# Patient Record
Sex: MALE | Race: WHITE | Employment: OTHER
[De-identification: names, ages, dates, MRNs, and addresses within clinical notes are randomized per-mention and may not be internally consistent; named-entity substitution may affect disease eponyms.]

---

## 2018-04-16 ENCOUNTER — RECORDS - HEALTHEAST (OUTPATIENT)
Dept: ADMINISTRATIVE | Facility: OTHER | Age: 83
End: 2018-04-16

## 2018-04-16 ENCOUNTER — AMBULATORY - HEALTHEAST (OUTPATIENT)
Dept: LAB | Facility: CLINIC | Age: 83
End: 2018-04-16

## 2018-04-16 DIAGNOSIS — D50.9 FE DEFICIENCY ANEMIA: ICD-10-CM

## 2018-04-16 LAB — HGB BLD-MCNC: 5.3 G/DL (ref 14–18)

## 2018-04-17 ENCOUNTER — RECORDS - HEALTHEAST (OUTPATIENT)
Dept: ADMINISTRATIVE | Facility: OTHER | Age: 83
End: 2018-04-17

## 2018-04-30 ENCOUNTER — AMBULATORY - HEALTHEAST (OUTPATIENT)
Dept: LAB | Facility: CLINIC | Age: 83
End: 2018-04-30

## 2018-04-30 DIAGNOSIS — D50.9 ANEMIA, IRON DEFICIENCY: ICD-10-CM

## 2018-04-30 LAB — HGB BLD-MCNC: 7 G/DL (ref 14–18)

## 2018-05-02 ENCOUNTER — RECORDS - HEALTHEAST (OUTPATIENT)
Dept: ADMINISTRATIVE | Facility: OTHER | Age: 83
End: 2018-05-02

## 2018-05-14 ENCOUNTER — AMBULATORY - HEALTHEAST (OUTPATIENT)
Dept: LAB | Facility: CLINIC | Age: 83
End: 2018-05-14

## 2018-05-14 DIAGNOSIS — D50.9 NORMOCYTIC HYPOCHROMIC ANEMIA: ICD-10-CM

## 2018-05-14 LAB — HGB BLD-MCNC: 5.3 G/DL (ref 14–18)

## 2018-05-16 ENCOUNTER — HOME CARE/HOSPICE - HEALTHEAST (OUTPATIENT)
Dept: HOME HEALTH SERVICES | Facility: HOME HEALTH | Age: 83
End: 2018-05-16

## 2018-05-18 ENCOUNTER — HOME CARE/HOSPICE - HEALTHEAST (OUTPATIENT)
Dept: HOME HEALTH SERVICES | Facility: HOME HEALTH | Age: 83
End: 2018-05-18

## 2018-05-25 ENCOUNTER — HOME CARE/HOSPICE - HEALTHEAST (OUTPATIENT)
Dept: HOME HEALTH SERVICES | Facility: HOME HEALTH | Age: 83
End: 2018-05-25

## 2018-05-30 ENCOUNTER — HOME CARE/HOSPICE - HEALTHEAST (OUTPATIENT)
Dept: HOME HEALTH SERVICES | Facility: HOME HEALTH | Age: 83
End: 2018-05-30

## 2018-06-01 ENCOUNTER — AMBULATORY - HEALTHEAST (OUTPATIENT)
Dept: LAB | Facility: CLINIC | Age: 83
End: 2018-06-01

## 2018-06-01 DIAGNOSIS — D50.9 ANEMIA, IRON DEFICIENCY: ICD-10-CM

## 2018-06-01 LAB — HGB BLD-MCNC: 7.8 G/DL (ref 14–18)

## 2018-06-12 ENCOUNTER — AMBULATORY - HEALTHEAST (OUTPATIENT)
Dept: LAB | Facility: CLINIC | Age: 83
End: 2018-06-12

## 2018-06-12 DIAGNOSIS — D50.9 IRON DEFICIENCY ANEMIA: ICD-10-CM

## 2018-06-12 LAB — HGB BLD-MCNC: 8.3 G/DL (ref 14–18)

## 2018-06-19 ENCOUNTER — AMBULATORY - HEALTHEAST (OUTPATIENT)
Dept: LAB | Facility: CLINIC | Age: 83
End: 2018-06-19

## 2018-06-19 DIAGNOSIS — D50.9 ANEMIA, IRON DEFICIENCY: ICD-10-CM

## 2018-06-19 LAB — HGB BLD-MCNC: 8.5 G/DL (ref 14–18)

## 2018-06-26 ENCOUNTER — AMBULATORY - HEALTHEAST (OUTPATIENT)
Dept: LAB | Facility: CLINIC | Age: 83
End: 2018-06-26

## 2018-06-26 DIAGNOSIS — D50.9 IRON DEFICIENCY ANEMIA: ICD-10-CM

## 2018-06-26 LAB
FERRITIN SERPL-MCNC: 17 NG/ML (ref 27–300)
HGB BLD-MCNC: 8.3 G/DL (ref 14–18)
IRON SATN MFR SERPL: 6 % (ref 20–50)
IRON SERPL-MCNC: 23 UG/DL (ref 42–175)
TIBC SERPL-MCNC: 357 UG/DL (ref 313–563)
TRANSFERRIN SERPL-MCNC: 286 MG/DL (ref 212–360)

## 2018-07-05 ENCOUNTER — AMBULATORY - HEALTHEAST (OUTPATIENT)
Dept: LAB | Facility: CLINIC | Age: 83
End: 2018-07-05

## 2018-07-05 DIAGNOSIS — D50.9 IRON DEFICIENCY ANEMIA: ICD-10-CM

## 2018-07-05 LAB — HGB BLD-MCNC: 8.1 G/DL (ref 14–18)

## 2018-07-23 ENCOUNTER — AMBULATORY - HEALTHEAST (OUTPATIENT)
Dept: LAB | Facility: CLINIC | Age: 83
End: 2018-07-23

## 2018-07-23 DIAGNOSIS — D50.9 IRON (FE) DEFICIENCY ANEMIA: ICD-10-CM

## 2018-07-23 LAB — HGB BLD-MCNC: 6 G/DL (ref 14–18)

## 2018-08-01 ENCOUNTER — OFFICE VISIT - HEALTHEAST (OUTPATIENT)
Dept: FAMILY MEDICINE | Facility: CLINIC | Age: 83
End: 2018-08-01

## 2018-08-01 ENCOUNTER — AMBULATORY - HEALTHEAST (OUTPATIENT)
Dept: CARE COORDINATION | Facility: CLINIC | Age: 83
End: 2018-08-01

## 2018-08-01 DIAGNOSIS — R05.9 COUGH: ICD-10-CM

## 2018-08-01 DIAGNOSIS — D64.9 ANEMIA: ICD-10-CM

## 2018-08-01 DIAGNOSIS — Z09 HOSPITAL DISCHARGE FOLLOW-UP: ICD-10-CM

## 2018-08-01 DIAGNOSIS — D50.0 IRON DEFICIENCY ANEMIA DUE TO CHRONIC BLOOD LOSS: ICD-10-CM

## 2018-08-01 LAB
ERYTHROCYTE [DISTWIDTH] IN BLOOD BY AUTOMATED COUNT: 18.2 % (ref 11–14.5)
HCT VFR BLD AUTO: 24.7 % (ref 40–54)
HGB BLD-MCNC: 7.6 G/DL (ref 14–18)
MCH RBC QN AUTO: 26.7 PG (ref 27–34)
MCHC RBC AUTO-ENTMCNC: 30.9 G/DL (ref 32–36)
MCV RBC AUTO: 87 FL (ref 80–100)
PLATELET # BLD AUTO: 262 THOU/UL (ref 140–440)
PMV BLD AUTO: 7.4 FL (ref 7–10)
RBC # BLD AUTO: 2.85 MILL/UL (ref 4.4–6.2)
WBC: 7.6 THOU/UL (ref 4–11)

## 2018-08-01 ASSESSMENT — MIFFLIN-ST. JEOR: SCORE: 1456.85

## 2018-08-06 ENCOUNTER — INFUSION - HEALTHEAST (OUTPATIENT)
Dept: INFUSION THERAPY | Facility: CLINIC | Age: 83
End: 2018-08-06

## 2018-08-06 DIAGNOSIS — D50.0 IRON DEFICIENCY ANEMIA DUE TO CHRONIC BLOOD LOSS: ICD-10-CM

## 2018-08-10 ENCOUNTER — INFUSION - HEALTHEAST (OUTPATIENT)
Dept: INFUSION THERAPY | Facility: CLINIC | Age: 83
End: 2018-08-10

## 2018-08-10 DIAGNOSIS — D50.0 IRON DEFICIENCY ANEMIA DUE TO CHRONIC BLOOD LOSS: ICD-10-CM

## 2018-08-13 ENCOUNTER — INFUSION - HEALTHEAST (OUTPATIENT)
Dept: INFUSION THERAPY | Facility: CLINIC | Age: 83
End: 2018-08-13

## 2018-08-13 DIAGNOSIS — D50.0 IRON DEFICIENCY ANEMIA DUE TO CHRONIC BLOOD LOSS: ICD-10-CM

## 2018-08-15 ENCOUNTER — INFUSION - HEALTHEAST (OUTPATIENT)
Dept: INFUSION THERAPY | Facility: CLINIC | Age: 83
End: 2018-08-15

## 2018-08-15 DIAGNOSIS — D50.0 IRON DEFICIENCY ANEMIA DUE TO CHRONIC BLOOD LOSS: ICD-10-CM

## 2018-08-17 ENCOUNTER — INFUSION - HEALTHEAST (OUTPATIENT)
Dept: INFUSION THERAPY | Facility: CLINIC | Age: 83
End: 2018-08-17

## 2018-08-17 DIAGNOSIS — D50.0 IRON DEFICIENCY ANEMIA DUE TO CHRONIC BLOOD LOSS: ICD-10-CM

## 2018-08-20 ENCOUNTER — INFUSION - HEALTHEAST (OUTPATIENT)
Dept: INFUSION THERAPY | Facility: CLINIC | Age: 83
End: 2018-08-20

## 2018-08-20 DIAGNOSIS — D50.0 IRON DEFICIENCY ANEMIA DUE TO CHRONIC BLOOD LOSS: ICD-10-CM

## 2018-08-22 ENCOUNTER — INFUSION - HEALTHEAST (OUTPATIENT)
Dept: INFUSION THERAPY | Facility: CLINIC | Age: 83
End: 2018-08-22

## 2018-08-22 DIAGNOSIS — D50.0 IRON DEFICIENCY ANEMIA DUE TO CHRONIC BLOOD LOSS: ICD-10-CM

## 2018-08-27 ENCOUNTER — INFUSION - HEALTHEAST (OUTPATIENT)
Dept: INFUSION THERAPY | Facility: CLINIC | Age: 83
End: 2018-08-27

## 2018-08-27 ENCOUNTER — COMMUNICATION - HEALTHEAST (OUTPATIENT)
Dept: FAMILY MEDICINE | Facility: CLINIC | Age: 83
End: 2018-08-27

## 2018-08-27 DIAGNOSIS — D50.0 IRON DEFICIENCY ANEMIA DUE TO CHRONIC BLOOD LOSS: ICD-10-CM

## 2018-08-27 DIAGNOSIS — D64.9 ANEMIA: ICD-10-CM

## 2018-08-29 ENCOUNTER — INFUSION - HEALTHEAST (OUTPATIENT)
Dept: INFUSION THERAPY | Facility: CLINIC | Age: 83
End: 2018-08-29

## 2018-08-29 DIAGNOSIS — D50.0 IRON DEFICIENCY ANEMIA DUE TO CHRONIC BLOOD LOSS: ICD-10-CM

## 2018-08-31 ENCOUNTER — INFUSION - HEALTHEAST (OUTPATIENT)
Dept: INFUSION THERAPY | Facility: CLINIC | Age: 83
End: 2018-08-31

## 2018-08-31 DIAGNOSIS — D50.0 IRON DEFICIENCY ANEMIA DUE TO CHRONIC BLOOD LOSS: ICD-10-CM

## 2018-09-19 ENCOUNTER — OFFICE VISIT - HEALTHEAST (OUTPATIENT)
Dept: FAMILY MEDICINE | Facility: CLINIC | Age: 83
End: 2018-09-19

## 2018-09-19 DIAGNOSIS — I10 HYPERTENSION: ICD-10-CM

## 2018-09-19 DIAGNOSIS — N40.0 BPH (BENIGN PROSTATIC HYPERPLASIA): ICD-10-CM

## 2018-09-19 DIAGNOSIS — D64.9 ANEMIA: ICD-10-CM

## 2018-09-19 DIAGNOSIS — I48.91 ATRIAL FIBRILLATION (H): ICD-10-CM

## 2018-09-19 LAB
ERYTHROCYTE [DISTWIDTH] IN BLOOD BY AUTOMATED COUNT: 13.8 % (ref 11–14.5)
FERRITIN SERPL-MCNC: 68 NG/ML (ref 27–300)
HCT VFR BLD AUTO: 37.6 % (ref 40–54)
HGB BLD-MCNC: 12.1 G/DL (ref 14–18)
MCH RBC QN AUTO: 29.6 PG (ref 27–34)
MCHC RBC AUTO-ENTMCNC: 32.1 G/DL (ref 32–36)
MCV RBC AUTO: 92 FL (ref 80–100)
PLATELET # BLD AUTO: 187 THOU/UL (ref 140–440)
PMV BLD AUTO: 7.2 FL (ref 7–10)
RBC # BLD AUTO: 4.07 MILL/UL (ref 4.4–6.2)
WBC: 6.7 THOU/UL (ref 4–11)

## 2018-09-21 ENCOUNTER — COMMUNICATION - HEALTHEAST (OUTPATIENT)
Dept: NURSING | Facility: CLINIC | Age: 83
End: 2018-09-21

## 2018-09-25 ENCOUNTER — COMMUNICATION - HEALTHEAST (OUTPATIENT)
Dept: FAMILY MEDICINE | Facility: CLINIC | Age: 83
End: 2018-09-25

## 2018-11-02 ENCOUNTER — AMBULATORY - HEALTHEAST (OUTPATIENT)
Dept: FAMILY MEDICINE | Facility: CLINIC | Age: 83
End: 2018-11-02

## 2018-11-02 ENCOUNTER — OFFICE VISIT - HEALTHEAST (OUTPATIENT)
Dept: FAMILY MEDICINE | Facility: CLINIC | Age: 83
End: 2018-11-02

## 2018-11-02 DIAGNOSIS — D64.9 ANEMIA: ICD-10-CM

## 2018-11-02 LAB
ERYTHROCYTE [DISTWIDTH] IN BLOOD BY AUTOMATED COUNT: 12.6 % (ref 11–14.5)
FERRITIN SERPL-MCNC: 32 NG/ML (ref 27–300)
HCT VFR BLD AUTO: 20.5 % (ref 40–54)
HGB BLD-MCNC: 6.7 G/DL (ref 14–18)
MCH RBC QN AUTO: 28.1 PG (ref 27–34)
MCHC RBC AUTO-ENTMCNC: 32.8 G/DL (ref 32–36)
MCV RBC AUTO: 86 FL (ref 80–100)
PLATELET # BLD AUTO: 247 THOU/UL (ref 140–440)
PMV BLD AUTO: 7 FL (ref 7–10)
RBC # BLD AUTO: 2.39 MILL/UL (ref 4.4–6.2)
WBC: 7 THOU/UL (ref 4–11)

## 2018-11-05 ENCOUNTER — COMMUNICATION - HEALTHEAST (OUTPATIENT)
Dept: FAMILY MEDICINE | Facility: CLINIC | Age: 83
End: 2018-11-05

## 2018-11-08 ENCOUNTER — COMMUNICATION - HEALTHEAST (OUTPATIENT)
Dept: CARE COORDINATION | Facility: CLINIC | Age: 83
End: 2018-11-08

## 2018-11-09 ENCOUNTER — COMMUNICATION - HEALTHEAST (OUTPATIENT)
Dept: FAMILY MEDICINE | Facility: CLINIC | Age: 83
End: 2018-11-09

## 2018-11-09 ENCOUNTER — INFUSION - HEALTHEAST (OUTPATIENT)
Dept: INFUSION THERAPY | Facility: CLINIC | Age: 83
End: 2018-11-09

## 2018-11-09 ENCOUNTER — OFFICE VISIT - HEALTHEAST (OUTPATIENT)
Dept: FAMILY MEDICINE | Facility: CLINIC | Age: 83
End: 2018-11-09

## 2018-11-09 DIAGNOSIS — D50.0 IRON DEFICIENCY ANEMIA DUE TO CHRONIC BLOOD LOSS: ICD-10-CM

## 2018-11-09 LAB
ERYTHROCYTE [DISTWIDTH] IN BLOOD BY AUTOMATED COUNT: 14.3 % (ref 11–14.5)
HCT VFR BLD AUTO: 27.1 % (ref 40–54)
HGB BLD-MCNC: 8.6 G/DL (ref 14–18)
MCH RBC QN AUTO: 27.9 PG (ref 27–34)
MCHC RBC AUTO-ENTMCNC: 31.8 G/DL (ref 32–36)
MCV RBC AUTO: 88 FL (ref 80–100)
PLATELET # BLD AUTO: 212 THOU/UL (ref 140–440)
PMV BLD AUTO: 7.2 FL (ref 7–10)
RBC # BLD AUTO: 3.08 MILL/UL (ref 4.4–6.2)
WBC: 7.2 THOU/UL (ref 4–11)

## 2018-11-12 ENCOUNTER — AMBULATORY - HEALTHEAST (OUTPATIENT)
Dept: ONCOLOGY | Facility: CLINIC | Age: 83
End: 2018-11-12

## 2018-11-12 DIAGNOSIS — D50.0 IRON DEFICIENCY ANEMIA DUE TO CHRONIC BLOOD LOSS: ICD-10-CM

## 2018-11-13 ENCOUNTER — TRANSFERRED RECORDS (OUTPATIENT)
Dept: HEALTH INFORMATION MANAGEMENT | Facility: CLINIC | Age: 83
End: 2018-11-13

## 2018-11-13 ENCOUNTER — INFUSION - HEALTHEAST (OUTPATIENT)
Dept: INFUSION THERAPY | Facility: CLINIC | Age: 83
End: 2018-11-13

## 2018-11-13 ENCOUNTER — OFFICE VISIT - HEALTHEAST (OUTPATIENT)
Dept: ONCOLOGY | Facility: CLINIC | Age: 83
End: 2018-11-13

## 2018-11-13 ENCOUNTER — AMBULATORY - HEALTHEAST (OUTPATIENT)
Dept: INFUSION THERAPY | Facility: CLINIC | Age: 83
End: 2018-11-13

## 2018-11-13 DIAGNOSIS — D50.0 IRON DEFICIENCY ANEMIA DUE TO CHRONIC BLOOD LOSS: ICD-10-CM

## 2018-11-13 DIAGNOSIS — I48.20 CHRONIC ATRIAL FIBRILLATION (H): ICD-10-CM

## 2018-11-13 DIAGNOSIS — N18.30 CKD (CHRONIC KIDNEY DISEASE) STAGE 3, GFR 30-59 ML/MIN (H): ICD-10-CM

## 2018-11-13 DIAGNOSIS — T50.995A ADDITIONAL MEDICATION REQUIRED FOR ADDITIVE EFFECT, INITIAL ENCOUNTER: ICD-10-CM

## 2018-11-13 DIAGNOSIS — K92.2 GASTROINTESTINAL HEMORRHAGE, UNSPECIFIED GASTROINTESTINAL HEMORRHAGE TYPE: ICD-10-CM

## 2018-11-13 LAB
ALBUMIN SERPL-MCNC: 3.5 G/DL (ref 3.5–5)
ALP SERPL-CCNC: 69 U/L (ref 45–120)
ALT SERPL W P-5'-P-CCNC: 12 U/L (ref 0–45)
ANION GAP SERPL CALCULATED.3IONS-SCNC: 9 MMOL/L (ref 5–18)
AST SERPL W P-5'-P-CCNC: 14 U/L (ref 0–40)
BASOPHILS # BLD AUTO: 0 THOU/UL (ref 0–0.2)
BASOPHILS NFR BLD AUTO: 0 % (ref 0–2)
BILIRUB SERPL-MCNC: 0.5 MG/DL (ref 0–1)
BUN SERPL-MCNC: 25 MG/DL (ref 8–28)
CALCIUM SERPL-MCNC: 8.7 MG/DL (ref 8.5–10.5)
CHLORIDE BLD-SCNC: 109 MMOL/L (ref 98–107)
CO2 SERPL-SCNC: 24 MMOL/L (ref 22–31)
CREAT SERPL-MCNC: 1.17 MG/DL (ref 0.7–1.3)
EOSINOPHIL # BLD AUTO: 0.2 THOU/UL (ref 0–0.4)
EOSINOPHIL NFR BLD AUTO: 3 % (ref 0–6)
ERYTHROCYTE [DISTWIDTH] IN BLOOD BY AUTOMATED COUNT: 15.4 % (ref 11–14.5)
FERRITIN SERPL-MCNC: 38 NG/ML (ref 27–300)
GFR SERPL CREATININE-BSD FRML MDRD: 59 ML/MIN/1.73M2
GLUCOSE BLD-MCNC: 121 MG/DL (ref 70–125)
HCT VFR BLD AUTO: 27.5 % (ref 40–54)
HGB BLD-MCNC: 8.1 G/DL (ref 14–18)
IRON SATN MFR SERPL: 5 % (ref 20–50)
IRON SERPL-MCNC: 15 UG/DL (ref 42–175)
LYMPHOCYTES # BLD AUTO: 0.9 THOU/UL (ref 0.8–4.4)
LYMPHOCYTES NFR BLD AUTO: 16 % (ref 20–40)
MCH RBC QN AUTO: 27.6 PG (ref 27–34)
MCHC RBC AUTO-ENTMCNC: 29.5 G/DL (ref 32–36)
MCV RBC AUTO: 94 FL (ref 80–100)
MONOCYTES # BLD AUTO: 0.4 THOU/UL (ref 0–0.9)
MONOCYTES NFR BLD AUTO: 7 % (ref 2–10)
NEUTROPHILS # BLD AUTO: 4.2 THOU/UL (ref 2–7.7)
NEUTROPHILS NFR BLD AUTO: 74 % (ref 50–70)
PLATELET # BLD AUTO: 174 THOU/UL (ref 140–440)
PMV BLD AUTO: 9.5 FL (ref 8.5–12.5)
POTASSIUM BLD-SCNC: 4 MMOL/L (ref 3.5–5)
PROT SERPL-MCNC: 6.2 G/DL (ref 6–8)
RBC # BLD AUTO: 2.93 MILL/UL (ref 4.4–6.2)
RETICS # AUTO: 0.12 MILL/UL (ref 0.01–0.11)
SODIUM SERPL-SCNC: 142 MMOL/L (ref 136–145)
TIBC SERPL-MCNC: 317 UG/DL (ref 313–563)
TRANSFERRIN SERPL-MCNC: 254 MG/DL (ref 212–360)
WBC: 5.7 THOU/UL (ref 4–11)

## 2018-11-14 ENCOUNTER — MEDICAL CORRESPONDENCE (OUTPATIENT)
Dept: HEALTH INFORMATION MANAGEMENT | Facility: CLINIC | Age: 83
End: 2018-11-14

## 2018-11-19 ENCOUNTER — INFUSION - HEALTHEAST (OUTPATIENT)
Dept: INFUSION THERAPY | Facility: CLINIC | Age: 83
End: 2018-11-19

## 2018-11-19 ENCOUNTER — DOCUMENTATION ONLY (OUTPATIENT)
Dept: GASTROENTEROLOGY | Facility: CLINIC | Age: 83
End: 2018-11-19

## 2018-11-19 DIAGNOSIS — D50.0 IRON DEFICIENCY ANEMIA DUE TO CHRONIC BLOOD LOSS: ICD-10-CM

## 2018-11-19 DIAGNOSIS — N18.30 CKD (CHRONIC KIDNEY DISEASE) STAGE 3, GFR 30-59 ML/MIN (H): ICD-10-CM

## 2018-11-19 DIAGNOSIS — T50.995A ADDITIONAL MEDICATION REQUIRED FOR ADDITIVE EFFECT, INITIAL ENCOUNTER: ICD-10-CM

## 2018-11-19 DIAGNOSIS — K92.2 GASTROINTESTINAL HEMORRHAGE, UNSPECIFIED GASTROINTESTINAL HEMORRHAGE TYPE: ICD-10-CM

## 2018-11-19 NOTE — PROGRESS NOTES
GI notes or primary provider notes related to GI problem:   PCP note, ED note - [facility] Everywhere,    Pathology reports: N    Recent Lab  Reports: Y    Radiology Reports (CT?MRI) : Y    Endoscopy: Y    Colonoscopy:Y    Referring GI Physician Name: N/A    Referring PCP Name: Dr. Vania Nur        Referral Date: 11/16/18 - Anemia     Date Complete Records Received and sent for review: 11/21/18    Date records scanned into epic: 11/21/18    Provider Review Date:     Date review routed back to sender:     Letter sent:       Notes:

## 2018-11-23 NOTE — PROGRESS NOTES
OUTSIDE REFERRAL REVIEW FORM - [facility]    Reason for referral: chronic recurrent anemia in setting of CKD + cardiac disease, associated TIFFANY    Date records reviewed by MD: 11/23/18    Previous work up: Yes, predominantly through [facility] and [facility]  - Labs  - EGD + Colonoscopy 4/2018  - CT scan 6/2018  - GI Evaluation    RECOMMENDATION    Schedule         Clinic appointment with GI MD (one-time 2nd Opinion Visit)        Clinic appointment with GI NP/PA or GI Fellow    When to schedule:        Next available slot    Date patient was contacted regarding scheduling decision:  Routed to  today.    Additional Comments:  As noted above.

## 2018-11-29 ENCOUNTER — TELEPHONE (OUTPATIENT)
Dept: GASTROENTEROLOGY | Facility: CLINIC | Age: 83
End: 2018-11-29

## 2018-11-29 NOTE — TELEPHONE ENCOUNTER
LVM for patient in regards to referral and records that have been through review process. Informed patient he has been approved for clinic visit. Left call back number for patient to call and schedule appointment.

## 2018-12-06 ENCOUNTER — TELEPHONE (OUTPATIENT)
Dept: GASTROENTEROLOGY | Facility: CLINIC | Age: 83
End: 2018-12-06

## 2018-12-06 NOTE — TELEPHONE ENCOUNTER
LVM #2 for patient in regards to referral and records that have been through review process. Informed patient he has been approved for clinic visit. Left call back number for patient to call and schedule appointment.

## 2018-12-11 ENCOUNTER — OFFICE VISIT - HEALTHEAST (OUTPATIENT)
Dept: ONCOLOGY | Facility: CLINIC | Age: 83
End: 2018-12-11

## 2018-12-11 ENCOUNTER — AMBULATORY - HEALTHEAST (OUTPATIENT)
Dept: INFUSION THERAPY | Facility: CLINIC | Age: 83
End: 2018-12-11

## 2018-12-11 DIAGNOSIS — K92.2 GASTROINTESTINAL HEMORRHAGE, UNSPECIFIED GASTROINTESTINAL HEMORRHAGE TYPE: ICD-10-CM

## 2018-12-11 DIAGNOSIS — D50.0 IRON DEFICIENCY ANEMIA DUE TO CHRONIC BLOOD LOSS: ICD-10-CM

## 2018-12-11 LAB
ERYTHROCYTE [DISTWIDTH] IN BLOOD BY AUTOMATED COUNT: 17.6 % (ref 11–14.5)
FERRITIN SERPL-MCNC: 46 NG/ML (ref 27–300)
HCT VFR BLD AUTO: 31.8 % (ref 40–54)
HGB BLD-MCNC: 9.4 G/DL (ref 14–18)
IRON SATN MFR SERPL: 7 % (ref 20–50)
IRON SERPL-MCNC: 22 UG/DL (ref 42–175)
MCH RBC QN AUTO: 28.4 PG (ref 27–34)
MCHC RBC AUTO-ENTMCNC: 29.6 G/DL (ref 32–36)
MCV RBC AUTO: 96 FL (ref 80–100)
PLATELET # BLD AUTO: 162 THOU/UL (ref 140–440)
PMV BLD AUTO: 9 FL (ref 8.5–12.5)
RBC # BLD AUTO: 3.31 MILL/UL (ref 4.4–6.2)
TIBC SERPL-MCNC: 336 UG/DL (ref 313–563)
TRANSFERRIN SERPL-MCNC: 269 MG/DL (ref 212–360)
WBC: 5.9 THOU/UL (ref 4–11)

## 2018-12-13 ENCOUNTER — OFFICE VISIT - HEALTHEAST (OUTPATIENT)
Dept: CARDIOLOGY | Facility: CLINIC | Age: 83
End: 2018-12-13

## 2018-12-13 DIAGNOSIS — R06.02 SOB (SHORTNESS OF BREATH): ICD-10-CM

## 2018-12-13 LAB
ATRIAL RATE - MUSE: 66 BPM
DIASTOLIC BLOOD PRESSURE - MUSE: NORMAL MMHG
INTERPRETATION ECG - MUSE: NORMAL
P AXIS - MUSE: NORMAL DEGREES
PR INTERVAL - MUSE: NORMAL MS
QRS DURATION - MUSE: 86 MS
QT - MUSE: 442 MS
QTC - MUSE: 414 MS
R AXIS - MUSE: 68 DEGREES
SYSTOLIC BLOOD PRESSURE - MUSE: NORMAL MMHG
T AXIS - MUSE: 73 DEGREES
VENTRICULAR RATE- MUSE: 53 BPM

## 2018-12-13 ASSESSMENT — MIFFLIN-ST. JEOR: SCORE: 1388.36

## 2018-12-14 ENCOUNTER — AMBULATORY - HEALTHEAST (OUTPATIENT)
Dept: CARDIOLOGY | Facility: CLINIC | Age: 83
End: 2018-12-14

## 2018-12-17 ENCOUNTER — AMBULATORY - HEALTHEAST (OUTPATIENT)
Dept: INFUSION THERAPY | Facility: CLINIC | Age: 83
End: 2018-12-17

## 2018-12-17 DIAGNOSIS — D50.0 IRON DEFICIENCY ANEMIA DUE TO CHRONIC BLOOD LOSS: ICD-10-CM

## 2018-12-17 LAB
ERYTHROCYTE [DISTWIDTH] IN BLOOD BY AUTOMATED COUNT: 17.1 % (ref 11–14.5)
HCT VFR BLD AUTO: 33.3 % (ref 40–54)
HGB BLD-MCNC: 9.9 G/DL (ref 14–18)
MCH RBC QN AUTO: 27.9 PG (ref 27–34)
MCHC RBC AUTO-ENTMCNC: 29.7 G/DL (ref 32–36)
MCV RBC AUTO: 94 FL (ref 80–100)
PLATELET # BLD AUTO: 171 THOU/UL (ref 140–440)
PMV BLD AUTO: 9.4 FL (ref 8.5–12.5)
RBC # BLD AUTO: 3.55 MILL/UL (ref 4.4–6.2)
WBC: 6 THOU/UL (ref 4–11)

## 2018-12-26 ENCOUNTER — HOSPITAL ENCOUNTER (OUTPATIENT)
Dept: CARDIOLOGY | Facility: CLINIC | Age: 83
Discharge: HOME | End: 2018-12-26
Attending: INTERNAL MEDICINE

## 2018-12-26 ENCOUNTER — COMMUNICATION - HEALTHEAST (OUTPATIENT)
Dept: CARDIOLOGY | Facility: CLINIC | Age: 83
End: 2018-12-26

## 2018-12-26 DIAGNOSIS — I35.0 SEVERE AORTIC STENOSIS: ICD-10-CM

## 2018-12-26 DIAGNOSIS — R06.02 SOB (SHORTNESS OF BREATH): ICD-10-CM

## 2018-12-26 LAB
AORTIC ROOT: 3.6 CM
AORTIC VALVE MEAN VELOCITY: 284 CM/S
AV DIMENSIONLESS INDEX VTI: 0.2
AV MEAN GRADIENT: 37 MMHG
AV PEAK GRADIENT: 59.9 MMHG
AV VALVE AREA: 0.6 CM2
AV VELOCITY RATIO: 0.2
BSA FOR ECHO PROCEDURE: 1.91 M2
CV BLOOD PRESSURE: NORMAL MMHG
CV ECHO HEIGHT: 68 IN
CV ECHO WEIGHT: 168 LBS
DOP CALC AO PEAK VEL: 387 CM/S
DOP CALC AO VTI: 94.3 CM
DOP CALC LVOT AREA: 3.46 CM2
DOP CALC LVOT DIAMETER: 2.1 CM
DOP CALC LVOT PEAK VEL: 65.4 CM/S
DOP CALC LVOT STROKE VOLUME: 54 CM3
DOP CALC MV VTI: 36.9 CM
DOP CALCLVOT PEAK VEL VTI: 15.6 CM
EJECTION FRACTION: 72 % (ref 55–75)
FRACTIONAL SHORTENING: 37.4 % (ref 28–44)
INTERVENTRICULAR SEPTUM IN END DIASTOLE: 0.74 CM (ref 0.6–1)
IVS/PW RATIO: 0.9
LA AREA 1: 20.3 CM2
LA AREA 2: 20.7 CM2
LEFT ATRIUM LENGTH: 5.49 CM
LEFT ATRIUM SIZE: 4.4 CM
LEFT ATRIUM TO AORTIC ROOT RATIO: 1.22 NO UNITS
LEFT ATRIUM VOLUME INDEX: 34.1 ML/M2
LEFT ATRIUM VOLUME: 65.1 ML
LEFT VENTRICLE CARDIAC INDEX: 1.7 L/MIN/M2
LEFT VENTRICLE CARDIAC OUTPUT: 3.2 L/MIN
LEFT VENTRICLE DIASTOLIC VOLUME INDEX: 66 CM3/M2 (ref 34–74)
LEFT VENTRICLE DIASTOLIC VOLUME: 126 CM3 (ref 62–150)
LEFT VENTRICLE HEART RATE: 60 BPM
LEFT VENTRICLE MASS INDEX: 70.1 G/M2
LEFT VENTRICLE SYSTOLIC VOLUME INDEX: 18.3 CM3/M2 (ref 11–31)
LEFT VENTRICLE SYSTOLIC VOLUME: 35 CM3 (ref 21–61)
LEFT VENTRICULAR INTERNAL DIMENSION IN DIASTOLE: 5.13 CM (ref 4.2–5.8)
LEFT VENTRICULAR INTERNAL DIMENSION IN SYSTOLE: 3.21 CM (ref 2.5–4)
LEFT VENTRICULAR MASS: 133.9 G
LEFT VENTRICULAR OUTFLOW TRACT MEAN GRADIENT: 1 MMHG
LEFT VENTRICULAR OUTFLOW TRACT MEAN VELOCITY: 47.6 CM/S
LEFT VENTRICULAR OUTFLOW TRACT PEAK GRADIENT: 2 MMHG
LEFT VENTRICULAR POSTERIOR WALL IN END DIASTOLE: 0.79 CM (ref 0.6–1)
LV STROKE VOLUME INDEX: 28.3 ML/M2
MITRAL VALVE MEAN INFLOW VELOCITY: 77.3 CM/S
MITRAL VALVE PEAK VELOCITY: 156 CM/S
MV AREA VTI: 1.46 CM2
MV AVERAGE E/E' RATIO: 19.5 CM/S
MV DECELERATION TIME: 201 MS
MV E'TISSUE VEL-LAT: 7.9 CM/S
MV E'TISSUE VEL-MED: 5.85 CM/S
MV LATERAL E/E' RATIO: 17
MV MEAN GRADIENT: 3 MMHG
MV MEDIAL E/E' RATIO: 22.9
MV PEAK E VELOCITY: 134 CM/S
MV PEAK GRADIENT: 9.7 MMHG
MV VALVE AREA BY CONTINUITY EQUATION: 1.5 CM2
NUC REST DIASTOLIC VOLUME INDEX: 2688 LBS
NUC REST SYSTOLIC VOLUME INDEX: 68 IN
PR MAX PG: 5 MMHG
PR PEAK VELOCITY: 110 CM/S
TRICUSPID REGURGITATION PEAK PRESSURE GRADIENT: 36 MMHG
TRICUSPID VALVE ANULAR PLANE SYSTOLIC EXCURSION: 2 CM
TRICUSPID VALVE PEAK REGURGITANT VELOCITY: 300 CM/S

## 2018-12-26 ASSESSMENT — MIFFLIN-ST. JEOR: SCORE: 1386.54

## 2018-12-31 ENCOUNTER — AMBULATORY - HEALTHEAST (OUTPATIENT)
Dept: INFUSION THERAPY | Facility: CLINIC | Age: 83
End: 2018-12-31

## 2018-12-31 DIAGNOSIS — D50.0 IRON DEFICIENCY ANEMIA DUE TO CHRONIC BLOOD LOSS: ICD-10-CM

## 2018-12-31 LAB
ERYTHROCYTE [DISTWIDTH] IN BLOOD BY AUTOMATED COUNT: 17.5 % (ref 11–14.5)
HCT VFR BLD AUTO: 31.6 % (ref 40–54)
HGB BLD-MCNC: 9.4 G/DL (ref 14–18)
MCH RBC QN AUTO: 27.4 PG (ref 27–34)
MCHC RBC AUTO-ENTMCNC: 29.7 G/DL (ref 32–36)
MCV RBC AUTO: 92 FL (ref 80–100)
PLATELET # BLD AUTO: 141 THOU/UL (ref 140–440)
PMV BLD AUTO: 9.1 FL (ref 8.5–12.5)
RBC # BLD AUTO: 3.43 MILL/UL (ref 4.4–6.2)
WBC: 5.5 THOU/UL (ref 4–11)

## 2019-01-08 ENCOUNTER — OFFICE VISIT - HEALTHEAST (OUTPATIENT)
Dept: CARDIOLOGY | Facility: CLINIC | Age: 84
End: 2019-01-08

## 2019-01-08 DIAGNOSIS — I35.0 SEVERE AORTIC STENOSIS: ICD-10-CM

## 2019-01-08 ASSESSMENT — MIFFLIN-ST. JEOR: SCORE: 1395.61

## 2019-01-09 ENCOUNTER — OFFICE VISIT - HEALTHEAST (OUTPATIENT)
Dept: CARDIOLOGY | Facility: CLINIC | Age: 84
End: 2019-01-09

## 2019-01-09 ENCOUNTER — HOSPITAL ENCOUNTER (OUTPATIENT)
Dept: CT IMAGING | Facility: CLINIC | Age: 84
Discharge: HOME | End: 2019-01-09
Attending: INTERNAL MEDICINE

## 2019-01-09 ENCOUNTER — AMBULATORY - HEALTHEAST (OUTPATIENT)
Dept: CARDIOLOGY | Facility: CLINIC | Age: 84
End: 2019-01-09

## 2019-01-09 DIAGNOSIS — I48.20 CHRONIC ATRIAL FIBRILLATION (H): ICD-10-CM

## 2019-01-09 DIAGNOSIS — D50.0 IRON DEFICIENCY ANEMIA DUE TO CHRONIC BLOOD LOSS: ICD-10-CM

## 2019-01-09 DIAGNOSIS — I35.0 SEVERE AORTIC STENOSIS: ICD-10-CM

## 2019-01-09 LAB
CREAT BLD-MCNC: 1.3 MG/DL
POC GFR AMER AF HE - HISTORICAL: >60 ML/MIN/1.73M2
POC GFR NON AMER AF HE - HISTORICAL: 52 ML/MIN/1.73M2

## 2019-01-09 ASSESSMENT — MIFFLIN-ST. JEOR: SCORE: 1404.69

## 2019-01-10 ENCOUNTER — AMBULATORY - HEALTHEAST (OUTPATIENT)
Dept: CARDIOLOGY | Facility: CLINIC | Age: 84
End: 2019-01-10

## 2019-01-10 DIAGNOSIS — I35.0 SEVERE AORTIC STENOSIS: ICD-10-CM

## 2019-01-10 DIAGNOSIS — R06.02 SOB (SHORTNESS OF BREATH): ICD-10-CM

## 2019-01-10 DIAGNOSIS — R42 DIZZINESS: ICD-10-CM

## 2019-01-10 DIAGNOSIS — R06.09 DOE (DYSPNEA ON EXERTION): ICD-10-CM

## 2019-01-11 ENCOUNTER — AMBULATORY - HEALTHEAST (OUTPATIENT)
Dept: CARDIOLOGY | Facility: CLINIC | Age: 84
End: 2019-01-11

## 2019-01-14 ENCOUNTER — AMBULATORY - HEALTHEAST (OUTPATIENT)
Dept: INFUSION THERAPY | Facility: CLINIC | Age: 84
End: 2019-01-14

## 2019-01-14 DIAGNOSIS — D50.0 IRON DEFICIENCY ANEMIA DUE TO CHRONIC BLOOD LOSS: ICD-10-CM

## 2019-01-14 LAB
ERYTHROCYTE [DISTWIDTH] IN BLOOD BY AUTOMATED COUNT: 16.6 % (ref 11–14.5)
HCT VFR BLD AUTO: 33.7 % (ref 40–54)
HGB BLD-MCNC: 10.1 G/DL (ref 14–18)
MCH RBC QN AUTO: 27.1 PG (ref 27–34)
MCHC RBC AUTO-ENTMCNC: 30 G/DL (ref 32–36)
MCV RBC AUTO: 90 FL (ref 80–100)
PLATELET # BLD AUTO: 171 THOU/UL (ref 140–440)
PMV BLD AUTO: 9.2 FL (ref 8.5–12.5)
RBC # BLD AUTO: 3.73 MILL/UL (ref 4.4–6.2)
WBC: 8.1 THOU/UL (ref 4–11)

## 2019-01-24 ENCOUNTER — OFFICE VISIT - HEALTHEAST (OUTPATIENT)
Dept: CARDIOLOGY | Facility: CLINIC | Age: 84
End: 2019-01-24

## 2019-01-24 DIAGNOSIS — I35.0 NONRHEUMATIC AORTIC VALVE STENOSIS: ICD-10-CM

## 2019-01-24 DIAGNOSIS — I48.20 CHRONIC ATRIAL FIBRILLATION (H): ICD-10-CM

## 2019-01-24 ASSESSMENT — MIFFLIN-ST. JEOR: SCORE: 1386.54

## 2019-01-25 ENCOUNTER — COMMUNICATION - HEALTHEAST (OUTPATIENT)
Dept: CARDIOLOGY | Facility: CLINIC | Age: 84
End: 2019-01-25

## 2019-01-25 ENCOUNTER — AMBULATORY - HEALTHEAST (OUTPATIENT)
Dept: CARDIOLOGY | Facility: CLINIC | Age: 84
End: 2019-01-25

## 2019-01-26 ASSESSMENT — MIFFLIN-ST. JEOR: SCORE: 1364.32

## 2019-01-27 ENCOUNTER — ANESTHESIA - HEALTHEAST (OUTPATIENT)
Dept: SURGERY | Facility: CLINIC | Age: 84
End: 2019-01-27

## 2019-01-27 ENCOUNTER — SURGERY - HEALTHEAST (OUTPATIENT)
Dept: SURGERY | Facility: CLINIC | Age: 84
End: 2019-01-27

## 2019-01-27 ASSESSMENT — MIFFLIN-ST. JEOR
SCORE: 1382.38
SCORE: 1366.59

## 2019-01-28 ENCOUNTER — COMMUNICATION - HEALTHEAST (OUTPATIENT)
Dept: NEUROSURGERY | Facility: CLINIC | Age: 84
End: 2019-01-28

## 2019-01-28 DIAGNOSIS — S06.5XAA SUBDURAL HEMATOMA (H): ICD-10-CM

## 2019-01-28 DIAGNOSIS — I60.9 SUBARACHNOID HEMORRHAGE (H): ICD-10-CM

## 2019-01-28 ASSESSMENT — MIFFLIN-ST. JEOR: SCORE: 1394.71

## 2019-01-29 ASSESSMENT — MIFFLIN-ST. JEOR: SCORE: 1416.48

## 2019-01-30 ASSESSMENT — MIFFLIN-ST. JEOR: SCORE: 1432.81

## 2019-01-31 ASSESSMENT — MIFFLIN-ST. JEOR: SCORE: 1431.91

## 2019-02-01 ASSESSMENT — MIFFLIN-ST. JEOR: SCORE: 1432.36

## 2019-02-02 ENCOUNTER — OFFICE VISIT - HEALTHEAST (OUTPATIENT)
Dept: GERIATRICS | Facility: CLINIC | Age: 84
End: 2019-02-02

## 2019-02-02 DIAGNOSIS — R53.81 PHYSICAL DECONDITIONING: ICD-10-CM

## 2019-02-02 DIAGNOSIS — D50.0 IRON DEFICIENCY ANEMIA DUE TO CHRONIC BLOOD LOSS: ICD-10-CM

## 2019-02-02 DIAGNOSIS — R56.9 SEIZURES (H): ICD-10-CM

## 2019-02-02 DIAGNOSIS — S72.142A CLOSED DISPLACED INTERTROCHANTERIC FRACTURE OF LEFT FEMUR, INITIAL ENCOUNTER (H): ICD-10-CM

## 2019-02-02 DIAGNOSIS — S06.5XAA SUBDURAL HEMATOMA (H): ICD-10-CM
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
## 2019-02-02 DIAGNOSIS — N40.0 BENIGN PROSTATIC HYPERPLASIA, UNSPECIFIED WHETHER LOWER URINARY TRACT SYMPTOMS PRESENT: ICD-10-CM

## 2019-02-02 DIAGNOSIS — I35.0 SEVERE AORTIC STENOSIS: ICD-10-CM

## 2019-02-02 DIAGNOSIS — N30.00 ACUTE CYSTITIS WITHOUT HEMATURIA: ICD-10-CM

## 2019-02-04 ENCOUNTER — OFFICE VISIT - HEALTHEAST (OUTPATIENT)
Dept: GERIATRICS | Facility: CLINIC | Age: 84
End: 2019-02-04

## 2019-02-04 DIAGNOSIS — R53.81 PHYSICAL DECONDITIONING: ICD-10-CM

## 2019-02-04 DIAGNOSIS — D50.0 IRON DEFICIENCY ANEMIA DUE TO CHRONIC BLOOD LOSS: ICD-10-CM

## 2019-02-04 DIAGNOSIS — N18.30 CKD (CHRONIC KIDNEY DISEASE) STAGE 3, GFR 30-59 ML/MIN (H): ICD-10-CM

## 2019-02-04 DIAGNOSIS — S06.5XAA SUBDURAL HEMATOMA (H): ICD-10-CM

## 2019-02-04 DIAGNOSIS — K22.70 BARRETT'S ESOPHAGUS WITHOUT DYSPLASIA: ICD-10-CM

## 2019-02-04 DIAGNOSIS — I35.0 SEVERE AORTIC STENOSIS: ICD-10-CM

## 2019-02-04 DIAGNOSIS — S72.142A INTERTROCHANTERIC FRACTURE OF LEFT FEMUR, CLOSED, INITIAL ENCOUNTER (H): ICD-10-CM

## 2019-02-04 DIAGNOSIS — I48.20 CHRONIC ATRIAL FIBRILLATION (H): ICD-10-CM

## 2019-02-04 DIAGNOSIS — N30.00 ACUTE CYSTITIS WITHOUT HEMATURIA: ICD-10-CM

## 2019-02-05 ENCOUNTER — RECORDS - HEALTHEAST (OUTPATIENT)
Dept: LAB | Facility: CLINIC | Age: 84
End: 2019-02-05

## 2019-02-05 ENCOUNTER — OFFICE VISIT - HEALTHEAST (OUTPATIENT)
Dept: GERIATRICS | Facility: CLINIC | Age: 84
End: 2019-02-05

## 2019-02-05 DIAGNOSIS — I35.0 SEVERE AORTIC STENOSIS: ICD-10-CM

## 2019-02-05 DIAGNOSIS — S72.142A CLOSED DISPLACED INTERTROCHANTERIC FRACTURE OF LEFT FEMUR, INITIAL ENCOUNTER (H): ICD-10-CM

## 2019-02-05 DIAGNOSIS — D50.0 IRON DEFICIENCY ANEMIA DUE TO CHRONIC BLOOD LOSS: ICD-10-CM

## 2019-02-05 DIAGNOSIS — R53.81 PHYSICAL DECONDITIONING: ICD-10-CM

## 2019-02-05 DIAGNOSIS — K92.2 GASTROINTESTINAL HEMORRHAGE, UNSPECIFIED GASTROINTESTINAL HEMORRHAGE TYPE: ICD-10-CM

## 2019-02-05 DIAGNOSIS — N40.0 BENIGN PROSTATIC HYPERPLASIA, UNSPECIFIED WHETHER LOWER URINARY TRACT SYMPTOMS PRESENT: ICD-10-CM

## 2019-02-05 DIAGNOSIS — I48.20 CHRONIC ATRIAL FIBRILLATION (H): ICD-10-CM

## 2019-02-05 DIAGNOSIS — S06.5XAA SUBDURAL HEMATOMA (H): ICD-10-CM

## 2019-02-05 DIAGNOSIS — Z97.8 FOLEY CATHETER IN PLACE: ICD-10-CM

## 2019-02-05 DIAGNOSIS — N18.30 CKD (CHRONIC KIDNEY DISEASE) STAGE 3, GFR 30-59 ML/MIN (H): ICD-10-CM

## 2019-02-05 LAB
ANION GAP SERPL CALCULATED.3IONS-SCNC: 8 MMOL/L (ref 5–18)
BASOPHILS # BLD AUTO: 0 THOU/UL (ref 0–0.2)
BASOPHILS NFR BLD AUTO: 0 % (ref 0–2)
BUN SERPL-MCNC: 20 MG/DL (ref 8–28)
CALCIUM SERPL-MCNC: 8.7 MG/DL (ref 8.5–10.5)
CHLORIDE BLD-SCNC: 104 MMOL/L (ref 98–107)
CO2 SERPL-SCNC: 26 MMOL/L (ref 22–31)
CREAT SERPL-MCNC: 0.97 MG/DL (ref 0.7–1.3)
EOSINOPHIL # BLD AUTO: 0.2 THOU/UL (ref 0–0.4)
EOSINOPHIL NFR BLD AUTO: 2 % (ref 0–6)
ERYTHROCYTE [DISTWIDTH] IN BLOOD BY AUTOMATED COUNT: 19.1 % (ref 11–14.5)
GFR SERPL CREATININE-BSD FRML MDRD: >60 ML/MIN/1.73M2
GLUCOSE BLD-MCNC: 87 MG/DL (ref 70–125)
HCT VFR BLD AUTO: 29.8 % (ref 40–54)
HGB BLD-MCNC: 8.7 G/DL (ref 14–18)
LYMPHOCYTES # BLD AUTO: 1.5 THOU/UL (ref 0.8–4.4)
LYMPHOCYTES NFR BLD AUTO: 16 % (ref 20–40)
MCH RBC QN AUTO: 26.5 PG (ref 27–34)
MCHC RBC AUTO-ENTMCNC: 29.2 G/DL (ref 32–36)
MCV RBC AUTO: 91 FL (ref 80–100)
MONOCYTES # BLD AUTO: 0.7 THOU/UL (ref 0–0.9)
MONOCYTES NFR BLD AUTO: 7 % (ref 2–10)
NEUTROPHILS # BLD AUTO: 6.8 THOU/UL (ref 2–7.7)
NEUTROPHILS NFR BLD AUTO: 74 % (ref 50–70)
PLATELET # BLD AUTO: 393 THOU/UL (ref 140–440)
PMV BLD AUTO: 11.6 FL (ref 8.5–12.5)
POTASSIUM BLD-SCNC: 3.8 MMOL/L (ref 3.5–5)
RBC # BLD AUTO: 3.28 MILL/UL (ref 4.4–6.2)
SODIUM SERPL-SCNC: 138 MMOL/L (ref 136–145)
WBC: 9.3 THOU/UL (ref 4–11)

## 2019-02-06 ENCOUNTER — RECORDS - HEALTHEAST (OUTPATIENT)
Dept: LAB | Facility: CLINIC | Age: 84
End: 2019-02-06

## 2019-02-06 ENCOUNTER — COMMUNICATION - HEALTHEAST (OUTPATIENT)
Dept: GERIATRICS | Facility: CLINIC | Age: 84
End: 2019-02-06

## 2019-02-06 LAB — HGB BLD-MCNC: 8 G/DL (ref 14–18)

## 2019-02-07 ENCOUNTER — RECORDS - HEALTHEAST (OUTPATIENT)
Dept: LAB | Facility: CLINIC | Age: 84
End: 2019-02-07

## 2019-02-07 ENCOUNTER — COMMUNICATION - HEALTHEAST (OUTPATIENT)
Dept: GERIATRICS | Facility: CLINIC | Age: 84
End: 2019-02-07

## 2019-02-07 LAB — HGB BLD-MCNC: 7.6 G/DL (ref 14–18)

## 2019-02-08 ENCOUNTER — OFFICE VISIT - HEALTHEAST (OUTPATIENT)
Dept: GERIATRICS | Facility: CLINIC | Age: 84
End: 2019-02-08

## 2019-02-08 ENCOUNTER — RECORDS - HEALTHEAST (OUTPATIENT)
Dept: LAB | Facility: CLINIC | Age: 84
End: 2019-02-08

## 2019-02-08 DIAGNOSIS — R53.81 PHYSICAL DECONDITIONING: ICD-10-CM

## 2019-02-08 DIAGNOSIS — N18.30 CKD (CHRONIC KIDNEY DISEASE) STAGE 3, GFR 30-59 ML/MIN (H): ICD-10-CM

## 2019-02-08 DIAGNOSIS — R33.9 URINARY RETENTION: ICD-10-CM

## 2019-02-08 DIAGNOSIS — Z97.8 FOLEY CATHETER IN PLACE: ICD-10-CM

## 2019-02-08 DIAGNOSIS — S72.142A INTERTROCHANTERIC FRACTURE OF LEFT FEMUR, CLOSED, INITIAL ENCOUNTER (H): ICD-10-CM

## 2019-02-08 DIAGNOSIS — S72.142A CLOSED DISPLACED INTERTROCHANTERIC FRACTURE OF LEFT FEMUR, INITIAL ENCOUNTER (H): ICD-10-CM

## 2019-02-08 DIAGNOSIS — N40.0 BENIGN PROSTATIC HYPERPLASIA, UNSPECIFIED WHETHER LOWER URINARY TRACT SYMPTOMS PRESENT: ICD-10-CM

## 2019-02-08 DIAGNOSIS — I60.9 SUBARACHNOID HEMORRHAGE (H): ICD-10-CM

## 2019-02-08 DIAGNOSIS — I48.20 CHRONIC ATRIAL FIBRILLATION (H): ICD-10-CM

## 2019-02-08 DIAGNOSIS — I35.0 SEVERE AORTIC STENOSIS: ICD-10-CM

## 2019-02-11 LAB — HGB BLD-MCNC: 8.5 G/DL (ref 14–18)

## 2019-02-12 ENCOUNTER — OFFICE VISIT - HEALTHEAST (OUTPATIENT)
Dept: GERIATRICS | Facility: CLINIC | Age: 84
End: 2019-02-12

## 2019-02-12 ENCOUNTER — TELEPHONE (OUTPATIENT)
Dept: GASTROENTEROLOGY | Facility: CLINIC | Age: 84
End: 2019-02-12

## 2019-02-12 DIAGNOSIS — I48.20 CHRONIC ATRIAL FIBRILLATION (H): ICD-10-CM

## 2019-02-12 DIAGNOSIS — R53.81 PHYSICAL DECONDITIONING: ICD-10-CM

## 2019-02-12 DIAGNOSIS — Z97.8 FOLEY CATHETER IN PLACE: ICD-10-CM

## 2019-02-12 DIAGNOSIS — I35.0 SEVERE AORTIC STENOSIS: ICD-10-CM

## 2019-02-12 DIAGNOSIS — N40.0 BENIGN PROSTATIC HYPERPLASIA, UNSPECIFIED WHETHER LOWER URINARY TRACT SYMPTOMS PRESENT: ICD-10-CM

## 2019-02-12 DIAGNOSIS — S72.142A CLOSED DISPLACED INTERTROCHANTERIC FRACTURE OF LEFT FEMUR, INITIAL ENCOUNTER (H): ICD-10-CM

## 2019-02-12 DIAGNOSIS — S06.5XAA SUBDURAL HEMATOMA (H): ICD-10-CM

## 2019-02-12 DIAGNOSIS — R33.9 URINARY RETENTION: ICD-10-CM

## 2019-02-12 DIAGNOSIS — N18.30 CKD (CHRONIC KIDNEY DISEASE) STAGE 3, GFR 30-59 ML/MIN (H): ICD-10-CM

## 2019-02-12 NOTE — TELEPHONE ENCOUNTER
Called and received message of vmail not being set up, unable to leave reminder for patient of appointment for 2/19/19 at 3PM.

## 2019-02-14 NOTE — TELEPHONE ENCOUNTER
FUTURE VISIT INFORMATION      FUTURE VISIT INFORMATION:    Date: 2/19/19     Time: 3PM    Location: [facility] [place]  REFERRAL INFORMATION:    Referring provider:  Dr. Vania Nur    Referring providers clinic:  [facility]     Reason for visit/diagnosis: Iron Deficiency anemia due to chronic blood loss, Gastrointestinal hemorrhage    NOTES STATUS DETAILS   OFFICE NOTE from referring provider Care Everywhere 11/13/18   OFFICE NOTE from other specialist Care Everywhere 12/11/18, 11/9/18, 8/1/18   DISCHARGE SUMMARY from hospital Care Everywhere 5/14/18   OPERATIVE REPORT Care Everywhere    MEDICATION LIST Care Everywhere In Visit notes        ENDOSCOPY  Care Everywhere 5/2/18, 4/17/18, 10/24/17   COLONOSCOPY Care Everywhere 7/27/18, 5/2/18, 4/18/18, 5/31/17   ERCP N/A    EUS N/A    STOOL TESTING Care Everywhere    PERTINENT LABS Internal/Care Everywhere    PATHOLOGY REPORTS (RELATED) Care Everywhere    IMAGING (CT, MRI, EGD) Care Everywhere

## 2019-02-15 ENCOUNTER — OFFICE VISIT - HEALTHEAST (OUTPATIENT)
Dept: GERIATRICS | Facility: CLINIC | Age: 84
End: 2019-02-15

## 2019-02-15 DIAGNOSIS — Z78.9 INTERMITTENT SELF-CATHETERIZATION OF BLADDER: ICD-10-CM

## 2019-02-15 DIAGNOSIS — I48.20 CHRONIC ATRIAL FIBRILLATION (H): ICD-10-CM

## 2019-02-15 DIAGNOSIS — R33.9 URINARY RETENTION: ICD-10-CM

## 2019-02-15 DIAGNOSIS — I35.0 SEVERE AORTIC STENOSIS: ICD-10-CM

## 2019-02-15 DIAGNOSIS — S72.142A CLOSED DISPLACED INTERTROCHANTERIC FRACTURE OF LEFT FEMUR, INITIAL ENCOUNTER (H): ICD-10-CM

## 2019-02-15 DIAGNOSIS — K22.70 BARRETT'S ESOPHAGUS WITHOUT DYSPLASIA: ICD-10-CM

## 2019-02-15 DIAGNOSIS — R53.81 PHYSICAL DECONDITIONING: ICD-10-CM

## 2019-02-15 DIAGNOSIS — N18.30 CKD (CHRONIC KIDNEY DISEASE) STAGE 3, GFR 30-59 ML/MIN (H): ICD-10-CM

## 2019-02-15 DIAGNOSIS — N40.0 BENIGN PROSTATIC HYPERPLASIA, UNSPECIFIED WHETHER LOWER URINARY TRACT SYMPTOMS PRESENT: ICD-10-CM

## 2019-02-15 DIAGNOSIS — S06.5XAA SUBDURAL HEMATOMA (H): ICD-10-CM

## 2019-02-15 DIAGNOSIS — D50.0 IRON DEFICIENCY ANEMIA DUE TO CHRONIC BLOOD LOSS: ICD-10-CM

## 2019-02-19 ENCOUNTER — OFFICE VISIT - HEALTHEAST (OUTPATIENT)
Dept: GERIATRICS | Facility: CLINIC | Age: 84
End: 2019-02-19

## 2019-02-19 ENCOUNTER — AMBULATORY - HEALTHEAST (OUTPATIENT)
Dept: INFUSION THERAPY | Facility: CLINIC | Age: 84
End: 2019-02-19

## 2019-02-19 ENCOUNTER — PRE VISIT (OUTPATIENT)
Dept: GASTROENTEROLOGY | Facility: CLINIC | Age: 84
End: 2019-02-19

## 2019-02-19 ENCOUNTER — OFFICE VISIT - HEALTHEAST (OUTPATIENT)
Dept: ONCOLOGY | Facility: CLINIC | Age: 84
End: 2019-02-19

## 2019-02-19 DIAGNOSIS — Z78.9 INTERMITTENT SELF-CATHETERIZATION OF BLADDER: ICD-10-CM

## 2019-02-19 DIAGNOSIS — N18.30 CKD (CHRONIC KIDNEY DISEASE) STAGE 3, GFR 30-59 ML/MIN (H): ICD-10-CM

## 2019-02-19 DIAGNOSIS — R53.81 PHYSICAL DECONDITIONING: ICD-10-CM

## 2019-02-19 DIAGNOSIS — I35.0 SEVERE AORTIC STENOSIS: ICD-10-CM

## 2019-02-19 DIAGNOSIS — I48.20 CHRONIC ATRIAL FIBRILLATION (H): ICD-10-CM

## 2019-02-19 DIAGNOSIS — S72.142A CLOSED DISPLACED INTERTROCHANTERIC FRACTURE OF LEFT FEMUR, INITIAL ENCOUNTER (H): ICD-10-CM

## 2019-02-19 DIAGNOSIS — D50.0 IRON DEFICIENCY ANEMIA DUE TO CHRONIC BLOOD LOSS: ICD-10-CM

## 2019-02-19 DIAGNOSIS — S06.5XAA SUBDURAL HEMATOMA (H): ICD-10-CM

## 2019-02-19 DIAGNOSIS — R33.9 URINARY RETENTION: ICD-10-CM

## 2019-02-19 DIAGNOSIS — K92.2 GASTROINTESTINAL HEMORRHAGE, UNSPECIFIED GASTROINTESTINAL HEMORRHAGE TYPE: ICD-10-CM

## 2019-02-19 LAB
ERYTHROCYTE [DISTWIDTH] IN BLOOD BY AUTOMATED COUNT: 19.6 % (ref 11–14.5)
FERRITIN SERPL-MCNC: 93 NG/ML (ref 27–300)
HCT VFR BLD AUTO: 31.9 % (ref 40–54)
HGB BLD-MCNC: 9.6 G/DL (ref 14–18)
IRON SATN MFR SERPL: 14 % (ref 20–50)
IRON SERPL-MCNC: 40 UG/DL (ref 42–175)
MCH RBC QN AUTO: 27.6 PG (ref 27–34)
MCHC RBC AUTO-ENTMCNC: 30.1 G/DL (ref 32–36)
MCV RBC AUTO: 92 FL (ref 80–100)
PLATELET # BLD AUTO: 256 THOU/UL (ref 140–440)
PMV BLD AUTO: 9.6 FL (ref 8.5–12.5)
RBC # BLD AUTO: 3.48 MILL/UL (ref 4.4–6.2)
TIBC SERPL-MCNC: 283 UG/DL (ref 313–563)
TRANSFERRIN SERPL-MCNC: 227 MG/DL (ref 212–360)
WBC: 9.1 THOU/UL (ref 4–11)

## 2019-02-20 ENCOUNTER — COMMUNICATION - HEALTHEAST (OUTPATIENT)
Dept: ONCOLOGY | Facility: CLINIC | Age: 84
End: 2019-02-20

## 2019-02-21 ENCOUNTER — OFFICE VISIT - HEALTHEAST (OUTPATIENT)
Dept: GERIATRICS | Facility: CLINIC | Age: 84
End: 2019-02-21

## 2019-02-21 DIAGNOSIS — Z78.9 INTERMITTENT SELF-CATHETERIZATION OF BLADDER: ICD-10-CM

## 2019-02-21 DIAGNOSIS — N40.0 BENIGN PROSTATIC HYPERPLASIA, UNSPECIFIED WHETHER LOWER URINARY TRACT SYMPTOMS PRESENT: ICD-10-CM

## 2019-02-21 DIAGNOSIS — I35.0 SEVERE AORTIC STENOSIS: ICD-10-CM

## 2019-02-21 DIAGNOSIS — N18.30 CKD (CHRONIC KIDNEY DISEASE) STAGE 3, GFR 30-59 ML/MIN (H): ICD-10-CM

## 2019-02-21 DIAGNOSIS — S72.142A CLOSED DISPLACED INTERTROCHANTERIC FRACTURE OF LEFT FEMUR, INITIAL ENCOUNTER (H): ICD-10-CM

## 2019-02-21 DIAGNOSIS — R33.9 URINARY RETENTION: ICD-10-CM

## 2019-02-21 DIAGNOSIS — D50.0 IRON DEFICIENCY ANEMIA DUE TO CHRONIC BLOOD LOSS: ICD-10-CM

## 2019-02-21 DIAGNOSIS — S06.5XAA SUBDURAL HEMATOMA (H): ICD-10-CM

## 2019-02-21 DIAGNOSIS — R53.81 PHYSICAL DECONDITIONING: ICD-10-CM

## 2019-02-21 DIAGNOSIS — I48.20 CHRONIC ATRIAL FIBRILLATION (H): ICD-10-CM

## 2019-02-26 ENCOUNTER — OFFICE VISIT - HEALTHEAST (OUTPATIENT)
Dept: GERIATRICS | Facility: CLINIC | Age: 84
End: 2019-02-26

## 2019-02-26 DIAGNOSIS — K92.2 GASTROINTESTINAL HEMORRHAGE, UNSPECIFIED GASTROINTESTINAL HEMORRHAGE TYPE: ICD-10-CM

## 2019-02-26 DIAGNOSIS — I48.20 CHRONIC ATRIAL FIBRILLATION (H): ICD-10-CM

## 2019-02-26 DIAGNOSIS — R53.81 PHYSICAL DECONDITIONING: ICD-10-CM

## 2019-02-26 DIAGNOSIS — Z78.9 INTERMITTENT SELF-CATHETERIZATION OF BLADDER: ICD-10-CM

## 2019-02-26 DIAGNOSIS — S06.5XAA SUBDURAL HEMATOMA (H): ICD-10-CM

## 2019-02-26 DIAGNOSIS — R06.02 SOB (SHORTNESS OF BREATH): ICD-10-CM

## 2019-02-26 DIAGNOSIS — R33.9 URINARY RETENTION: ICD-10-CM

## 2019-02-26 DIAGNOSIS — N18.30 CKD (CHRONIC KIDNEY DISEASE) STAGE 3, GFR 30-59 ML/MIN (H): ICD-10-CM

## 2019-02-26 DIAGNOSIS — I35.0 SEVERE AORTIC STENOSIS: ICD-10-CM

## 2019-02-26 DIAGNOSIS — S72.142A CLOSED DISPLACED INTERTROCHANTERIC FRACTURE OF LEFT FEMUR, INITIAL ENCOUNTER (H): ICD-10-CM

## 2019-02-26 DIAGNOSIS — N40.0 BENIGN PROSTATIC HYPERPLASIA, UNSPECIFIED WHETHER LOWER URINARY TRACT SYMPTOMS PRESENT: ICD-10-CM

## 2019-02-28 ENCOUNTER — OFFICE VISIT - HEALTHEAST (OUTPATIENT)
Dept: GERIATRICS | Facility: CLINIC | Age: 84
End: 2019-02-28

## 2019-02-28 DIAGNOSIS — N18.30 CKD (CHRONIC KIDNEY DISEASE) STAGE 3, GFR 30-59 ML/MIN (H): ICD-10-CM

## 2019-02-28 DIAGNOSIS — Z78.9 INTERMITTENT SELF-CATHETERIZATION OF BLADDER: ICD-10-CM

## 2019-02-28 DIAGNOSIS — S72.142A CLOSED DISPLACED INTERTROCHANTERIC FRACTURE OF LEFT FEMUR, INITIAL ENCOUNTER (H): ICD-10-CM

## 2019-02-28 DIAGNOSIS — R53.81 PHYSICAL DECONDITIONING: ICD-10-CM

## 2019-02-28 DIAGNOSIS — I48.20 CHRONIC ATRIAL FIBRILLATION (H): ICD-10-CM

## 2019-02-28 DIAGNOSIS — D50.0 IRON DEFICIENCY ANEMIA DUE TO CHRONIC BLOOD LOSS: ICD-10-CM

## 2019-02-28 DIAGNOSIS — N40.0 BENIGN PROSTATIC HYPERPLASIA, UNSPECIFIED WHETHER LOWER URINARY TRACT SYMPTOMS PRESENT: ICD-10-CM

## 2019-02-28 DIAGNOSIS — I35.0 SEVERE AORTIC STENOSIS: ICD-10-CM

## 2019-03-01 ENCOUNTER — HOSPITAL ENCOUNTER (OUTPATIENT)
Dept: CT IMAGING | Facility: CLINIC | Age: 84
Discharge: HOME | End: 2019-03-01

## 2019-03-01 ENCOUNTER — OFFICE VISIT - HEALTHEAST (OUTPATIENT)
Dept: NEUROSURGERY | Facility: CLINIC | Age: 84
End: 2019-03-01

## 2019-03-01 DIAGNOSIS — S06.5XAA SUBDURAL HEMATOMA (H): ICD-10-CM

## 2019-03-01 DIAGNOSIS — I60.9 SUBARACHNOID HEMORRHAGE (H): ICD-10-CM

## 2019-03-01 ASSESSMENT — MIFFLIN-ST. JEOR: SCORE: 1393.35

## 2019-03-05 ENCOUNTER — OFFICE VISIT - HEALTHEAST (OUTPATIENT)
Dept: GERIATRICS | Facility: CLINIC | Age: 84
End: 2019-03-05

## 2019-03-05 DIAGNOSIS — K92.2 GASTROINTESTINAL HEMORRHAGE, UNSPECIFIED GASTROINTESTINAL HEMORRHAGE TYPE: ICD-10-CM

## 2019-03-05 DIAGNOSIS — D50.0 IRON DEFICIENCY ANEMIA DUE TO CHRONIC BLOOD LOSS: ICD-10-CM

## 2019-03-05 DIAGNOSIS — S06.5XAA SUBDURAL HEMATOMA (H): ICD-10-CM

## 2019-03-05 DIAGNOSIS — S72.142A CLOSED DISPLACED INTERTROCHANTERIC FRACTURE OF LEFT FEMUR, INITIAL ENCOUNTER (H): ICD-10-CM

## 2019-03-05 DIAGNOSIS — I48.20 CHRONIC ATRIAL FIBRILLATION (H): ICD-10-CM

## 2019-03-05 DIAGNOSIS — R53.81 PHYSICAL DECONDITIONING: ICD-10-CM

## 2019-03-05 DIAGNOSIS — I35.0 SEVERE AORTIC STENOSIS: ICD-10-CM

## 2019-03-08 ENCOUNTER — OFFICE VISIT - HEALTHEAST (OUTPATIENT)
Dept: GERIATRICS | Facility: CLINIC | Age: 84
End: 2019-03-08

## 2019-03-08 DIAGNOSIS — I35.0 SEVERE AORTIC STENOSIS: ICD-10-CM

## 2019-03-08 DIAGNOSIS — K92.2 GASTROINTESTINAL HEMORRHAGE, UNSPECIFIED GASTROINTESTINAL HEMORRHAGE TYPE: ICD-10-CM

## 2019-03-08 DIAGNOSIS — S06.5XAA SUBDURAL HEMATOMA (H): ICD-10-CM

## 2019-03-08 DIAGNOSIS — I48.20 CHRONIC ATRIAL FIBRILLATION (H): ICD-10-CM

## 2019-03-08 DIAGNOSIS — R42 DIZZINESS: ICD-10-CM

## 2019-03-08 DIAGNOSIS — R33.9 URINARY RETENTION: ICD-10-CM

## 2019-03-08 DIAGNOSIS — Z78.9 INTERMITTENT SELF-CATHETERIZATION OF BLADDER: ICD-10-CM

## 2019-03-08 DIAGNOSIS — R41.89 COGNITIVE IMPAIRMENT: ICD-10-CM

## 2019-03-08 DIAGNOSIS — S72.142A INTERTROCHANTERIC FRACTURE OF LEFT FEMUR, CLOSED, INITIAL ENCOUNTER (H): ICD-10-CM

## 2019-03-08 DIAGNOSIS — N18.30 CKD (CHRONIC KIDNEY DISEASE) STAGE 3, GFR 30-59 ML/MIN (H): ICD-10-CM

## 2019-03-08 DIAGNOSIS — R53.81 PHYSICAL DECONDITIONING: ICD-10-CM

## 2019-03-12 ENCOUNTER — OFFICE VISIT - HEALTHEAST (OUTPATIENT)
Dept: GERIATRICS | Facility: CLINIC | Age: 84
End: 2019-03-12

## 2019-03-12 DIAGNOSIS — S72.142A INTERTROCHANTERIC FRACTURE OF LEFT FEMUR, CLOSED, INITIAL ENCOUNTER (H): ICD-10-CM

## 2019-03-12 DIAGNOSIS — R06.09 DOE (DYSPNEA ON EXERTION): ICD-10-CM

## 2019-03-12 DIAGNOSIS — R41.89 COGNITIVE IMPAIRMENT: ICD-10-CM

## 2019-03-12 DIAGNOSIS — S06.5XAA SUBDURAL HEMATOMA (H): ICD-10-CM

## 2019-03-12 DIAGNOSIS — D50.0 IRON DEFICIENCY ANEMIA DUE TO CHRONIC BLOOD LOSS: ICD-10-CM

## 2019-03-12 DIAGNOSIS — R33.9 URINARY RETENTION: ICD-10-CM

## 2019-03-12 DIAGNOSIS — R53.81 PHYSICAL DECONDITIONING: ICD-10-CM

## 2019-03-12 DIAGNOSIS — I35.0 SEVERE AORTIC STENOSIS: ICD-10-CM

## 2019-03-12 DIAGNOSIS — N18.30 CKD (CHRONIC KIDNEY DISEASE) STAGE 3, GFR 30-59 ML/MIN (H): ICD-10-CM

## 2019-03-12 DIAGNOSIS — I48.20 CHRONIC ATRIAL FIBRILLATION (H): ICD-10-CM

## 2019-03-14 ENCOUNTER — OFFICE VISIT - HEALTHEAST (OUTPATIENT)
Dept: GERIATRICS | Facility: CLINIC | Age: 84
End: 2019-03-14

## 2019-03-14 DIAGNOSIS — I35.0 SEVERE AORTIC STENOSIS: ICD-10-CM

## 2019-03-14 DIAGNOSIS — I48.20 CHRONIC ATRIAL FIBRILLATION (H): ICD-10-CM

## 2019-03-14 DIAGNOSIS — N18.30 CKD (CHRONIC KIDNEY DISEASE) STAGE 3, GFR 30-59 ML/MIN (H): ICD-10-CM

## 2019-03-14 DIAGNOSIS — S06.5XAA SUBDURAL HEMATOMA (H): ICD-10-CM

## 2019-03-14 DIAGNOSIS — N40.0 BENIGN PROSTATIC HYPERPLASIA, UNSPECIFIED WHETHER LOWER URINARY TRACT SYMPTOMS PRESENT: ICD-10-CM

## 2019-03-14 DIAGNOSIS — D50.0 IRON DEFICIENCY ANEMIA DUE TO CHRONIC BLOOD LOSS: ICD-10-CM

## 2019-03-14 DIAGNOSIS — S72.142A INTERTROCHANTERIC FRACTURE OF LEFT FEMUR, CLOSED, INITIAL ENCOUNTER (H): ICD-10-CM

## 2019-03-14 DIAGNOSIS — R53.81 PHYSICAL DECONDITIONING: ICD-10-CM

## 2019-03-14 DIAGNOSIS — R33.9 URINARY RETENTION: ICD-10-CM

## 2019-03-14 DIAGNOSIS — K92.2 GASTROINTESTINAL HEMORRHAGE, UNSPECIFIED GASTROINTESTINAL HEMORRHAGE TYPE: ICD-10-CM

## 2019-03-19 ENCOUNTER — OFFICE VISIT - HEALTHEAST (OUTPATIENT)
Dept: GERIATRICS | Facility: CLINIC | Age: 84
End: 2019-03-19

## 2019-03-19 DIAGNOSIS — S72.142A INTERTROCHANTERIC FRACTURE OF LEFT FEMUR, CLOSED, INITIAL ENCOUNTER (H): ICD-10-CM

## 2019-03-19 DIAGNOSIS — N18.30 CKD (CHRONIC KIDNEY DISEASE) STAGE 3, GFR 30-59 ML/MIN (H): ICD-10-CM

## 2019-03-19 DIAGNOSIS — D50.0 IRON DEFICIENCY ANEMIA DUE TO CHRONIC BLOOD LOSS: ICD-10-CM

## 2019-03-19 DIAGNOSIS — I48.20 CHRONIC ATRIAL FIBRILLATION (H): ICD-10-CM

## 2019-03-19 DIAGNOSIS — N40.0 BENIGN PROSTATIC HYPERPLASIA, UNSPECIFIED WHETHER LOWER URINARY TRACT SYMPTOMS PRESENT: ICD-10-CM

## 2019-03-19 DIAGNOSIS — R33.9 URINARY RETENTION: ICD-10-CM

## 2019-03-19 DIAGNOSIS — I35.0 SEVERE AORTIC STENOSIS: ICD-10-CM

## 2019-03-19 DIAGNOSIS — K92.2 GASTROINTESTINAL HEMORRHAGE, UNSPECIFIED GASTROINTESTINAL HEMORRHAGE TYPE: ICD-10-CM

## 2019-03-19 DIAGNOSIS — R53.81 PHYSICAL DECONDITIONING: ICD-10-CM

## 2019-03-21 ENCOUNTER — COMMUNICATION - HEALTHEAST (OUTPATIENT)
Dept: GERIATRICS | Facility: CLINIC | Age: 84
End: 2019-03-21

## 2019-03-21 ENCOUNTER — AMBULATORY - HEALTHEAST (OUTPATIENT)
Dept: GERIATRICS | Facility: CLINIC | Age: 84
End: 2019-03-21

## 2019-03-22 ENCOUNTER — RECORDS - HEALTHEAST (OUTPATIENT)
Dept: ADMINISTRATIVE | Facility: OTHER | Age: 84
End: 2019-03-22

## 2019-03-22 ENCOUNTER — COMMUNICATION - HEALTHEAST (OUTPATIENT)
Dept: FAMILY MEDICINE | Facility: CLINIC | Age: 84
End: 2019-03-22

## 2019-03-22 ENCOUNTER — AMBULATORY - HEALTHEAST (OUTPATIENT)
Dept: FAMILY MEDICINE | Facility: CLINIC | Age: 84
End: 2019-03-22

## 2019-03-22 DIAGNOSIS — D50.0 IRON DEFICIENCY ANEMIA DUE TO CHRONIC BLOOD LOSS: ICD-10-CM

## 2019-03-25 ENCOUNTER — COMMUNICATION - HEALTHEAST (OUTPATIENT)
Dept: FAMILY MEDICINE | Facility: CLINIC | Age: 84
End: 2019-03-25

## 2019-03-26 ENCOUNTER — OFFICE VISIT - HEALTHEAST (OUTPATIENT)
Dept: FAMILY MEDICINE | Facility: CLINIC | Age: 84
End: 2019-03-26

## 2019-03-26 DIAGNOSIS — N40.0 BENIGN PROSTATIC HYPERPLASIA, UNSPECIFIED WHETHER LOWER URINARY TRACT SYMPTOMS PRESENT: ICD-10-CM

## 2019-03-26 DIAGNOSIS — D50.0 IRON DEFICIENCY ANEMIA DUE TO CHRONIC BLOOD LOSS: ICD-10-CM

## 2019-03-26 DIAGNOSIS — I48.20 CHRONIC ATRIAL FIBRILLATION (H): ICD-10-CM

## 2019-03-26 DIAGNOSIS — I35.0 SEVERE AORTIC STENOSIS: ICD-10-CM

## 2019-03-26 DIAGNOSIS — S06.5XAA SUBDURAL HEMATOMA (H): ICD-10-CM

## 2019-03-26 DIAGNOSIS — S72.142A CLOSED DISPLACED INTERTROCHANTERIC FRACTURE OF LEFT FEMUR, INITIAL ENCOUNTER (H): ICD-10-CM

## 2019-03-28 ENCOUNTER — COMMUNICATION - HEALTHEAST (OUTPATIENT)
Dept: FAMILY MEDICINE | Facility: CLINIC | Age: 84
End: 2019-03-28

## 2019-04-02 ENCOUNTER — OFFICE VISIT - HEALTHEAST (OUTPATIENT)
Dept: ONCOLOGY | Facility: CLINIC | Age: 84
End: 2019-04-02

## 2019-04-02 ENCOUNTER — DOCUMENTATION ONLY (OUTPATIENT)
Dept: OTHER | Facility: CLINIC | Age: 84
End: 2019-04-02

## 2019-04-02 ENCOUNTER — AMBULATORY - HEALTHEAST (OUTPATIENT)
Dept: OTHER | Facility: CLINIC | Age: 84
End: 2019-04-02

## 2019-04-02 ENCOUNTER — AMBULATORY - HEALTHEAST (OUTPATIENT)
Dept: INFUSION THERAPY | Facility: CLINIC | Age: 84
End: 2019-04-02

## 2019-04-02 DIAGNOSIS — D50.0 IRON DEFICIENCY ANEMIA DUE TO CHRONIC BLOOD LOSS: ICD-10-CM

## 2019-04-02 LAB
ERYTHROCYTE [DISTWIDTH] IN BLOOD BY AUTOMATED COUNT: 18.2 % (ref 11–14.5)
FERRITIN SERPL-MCNC: 54 NG/ML (ref 27–300)
HCT VFR BLD AUTO: 31.5 % (ref 40–54)
HGB BLD-MCNC: 9.7 G/DL (ref 14–18)
IRON SATN MFR SERPL: 9 % (ref 20–50)
IRON SERPL-MCNC: 27 UG/DL (ref 42–175)
MCH RBC QN AUTO: 29.2 PG (ref 27–34)
MCHC RBC AUTO-ENTMCNC: 30.8 G/DL (ref 32–36)
MCV RBC AUTO: 95 FL (ref 80–100)
PLATELET # BLD AUTO: 158 THOU/UL (ref 140–440)
PMV BLD AUTO: 8.9 FL (ref 8.5–12.5)
RBC # BLD AUTO: 3.32 MILL/UL (ref 4.4–6.2)
TIBC SERPL-MCNC: 290 UG/DL (ref 313–563)
TRANSFERRIN SERPL-MCNC: 232 MG/DL (ref 212–360)
WBC: 6.3 THOU/UL (ref 4–11)

## 2019-04-05 ENCOUNTER — COMMUNICATION - HEALTHEAST (OUTPATIENT)
Dept: ONCOLOGY | Facility: CLINIC | Age: 84
End: 2019-04-05

## 2019-04-10 ENCOUNTER — AMBULATORY - HEALTHEAST (OUTPATIENT)
Dept: ONCOLOGY | Facility: CLINIC | Age: 84
End: 2019-04-10

## 2019-04-18 ENCOUNTER — COMMUNICATION - HEALTHEAST (OUTPATIENT)
Dept: GERIATRICS | Facility: CLINIC | Age: 84
End: 2019-04-18

## 2019-04-18 ENCOUNTER — INFUSION - HEALTHEAST (OUTPATIENT)
Dept: INFUSION THERAPY | Facility: CLINIC | Age: 84
End: 2019-04-18

## 2019-04-18 ENCOUNTER — COMMUNICATION - HEALTHEAST (OUTPATIENT)
Dept: FAMILY MEDICINE | Facility: CLINIC | Age: 84
End: 2019-04-18

## 2019-04-18 DIAGNOSIS — N18.30 CKD (CHRONIC KIDNEY DISEASE) STAGE 3, GFR 30-59 ML/MIN (H): ICD-10-CM

## 2019-04-18 DIAGNOSIS — R35.0 BENIGN PROSTATIC HYPERPLASIA WITH URINARY FREQUENCY: ICD-10-CM

## 2019-04-18 DIAGNOSIS — D50.0 IRON DEFICIENCY ANEMIA DUE TO CHRONIC BLOOD LOSS: ICD-10-CM

## 2019-04-18 DIAGNOSIS — T50.995A ADDITIONAL MEDICATION REQUIRED FOR ADDITIVE EFFECT, INITIAL ENCOUNTER: ICD-10-CM

## 2019-04-18 DIAGNOSIS — K92.2 GASTROINTESTINAL HEMORRHAGE, UNSPECIFIED GASTROINTESTINAL HEMORRHAGE TYPE: ICD-10-CM

## 2019-04-18 DIAGNOSIS — N40.1 BENIGN PROSTATIC HYPERPLASIA WITH URINARY FREQUENCY: ICD-10-CM

## 2019-04-24 ENCOUNTER — INFUSION - HEALTHEAST (OUTPATIENT)
Dept: INFUSION THERAPY | Facility: CLINIC | Age: 84
End: 2019-04-24

## 2019-04-24 DIAGNOSIS — N18.30 CKD (CHRONIC KIDNEY DISEASE) STAGE 3, GFR 30-59 ML/MIN (H): ICD-10-CM

## 2019-04-24 DIAGNOSIS — D50.0 IRON DEFICIENCY ANEMIA DUE TO CHRONIC BLOOD LOSS: ICD-10-CM

## 2019-04-24 DIAGNOSIS — T50.995A ADDITIONAL MEDICATION REQUIRED FOR ADDITIVE EFFECT, INITIAL ENCOUNTER: ICD-10-CM

## 2019-04-24 DIAGNOSIS — K92.2 GASTROINTESTINAL HEMORRHAGE, UNSPECIFIED GASTROINTESTINAL HEMORRHAGE TYPE: ICD-10-CM

## 2019-04-26 ENCOUNTER — COMMUNICATION - HEALTHEAST (OUTPATIENT)
Dept: ONCOLOGY | Facility: CLINIC | Age: 84
End: 2019-04-26

## 2019-04-26 ENCOUNTER — COMMUNICATION - HEALTHEAST (OUTPATIENT)
Dept: FAMILY MEDICINE | Facility: CLINIC | Age: 84
End: 2019-04-26

## 2019-04-26 DIAGNOSIS — D50.0 IRON DEFICIENCY ANEMIA DUE TO CHRONIC BLOOD LOSS: ICD-10-CM

## 2019-04-29 ENCOUNTER — COMMUNICATION - HEALTHEAST (OUTPATIENT)
Dept: FAMILY MEDICINE | Facility: CLINIC | Age: 84
End: 2019-04-29

## 2019-04-30 ENCOUNTER — RECORDS - HEALTHEAST (OUTPATIENT)
Dept: ADMINISTRATIVE | Facility: OTHER | Age: 84
End: 2019-04-30

## 2019-05-01 ENCOUNTER — RECORDS - HEALTHEAST (OUTPATIENT)
Dept: ADMINISTRATIVE | Facility: OTHER | Age: 84
End: 2019-05-01

## 2019-05-18 ENCOUNTER — COMMUNICATION - HEALTHEAST (OUTPATIENT)
Dept: FAMILY MEDICINE | Facility: CLINIC | Age: 84
End: 2019-05-18

## 2019-05-18 DIAGNOSIS — R35.0 BENIGN PROSTATIC HYPERPLASIA WITH URINARY FREQUENCY: ICD-10-CM

## 2019-05-18 DIAGNOSIS — N40.1 BENIGN PROSTATIC HYPERPLASIA WITH URINARY FREQUENCY: ICD-10-CM

## 2019-05-21 ENCOUNTER — AMBULATORY - HEALTHEAST (OUTPATIENT)
Dept: INFUSION THERAPY | Facility: CLINIC | Age: 84
End: 2019-05-21

## 2019-05-21 ENCOUNTER — OFFICE VISIT - HEALTHEAST (OUTPATIENT)
Dept: ONCOLOGY | Facility: CLINIC | Age: 84
End: 2019-05-21

## 2019-05-21 DIAGNOSIS — D50.0 IRON DEFICIENCY ANEMIA DUE TO CHRONIC BLOOD LOSS: ICD-10-CM

## 2019-05-21 DIAGNOSIS — N18.30 CKD (CHRONIC KIDNEY DISEASE) STAGE 3, GFR 30-59 ML/MIN (H): ICD-10-CM

## 2019-05-21 LAB
ERYTHROCYTE [DISTWIDTH] IN BLOOD BY AUTOMATED COUNT: 15.8 % (ref 11–14.5)
FERRITIN SERPL-MCNC: 304 NG/ML (ref 27–300)
HCT VFR BLD AUTO: 33.8 % (ref 40–54)
HGB BLD-MCNC: 10.6 G/DL (ref 14–18)
IRON SATN MFR SERPL: 15 % (ref 20–50)
IRON SERPL-MCNC: 36 UG/DL (ref 42–175)
MCH RBC QN AUTO: 30.6 PG (ref 27–34)
MCHC RBC AUTO-ENTMCNC: 31.4 G/DL (ref 32–36)
MCV RBC AUTO: 98 FL (ref 80–100)
PLATELET # BLD AUTO: 145 THOU/UL (ref 140–440)
PMV BLD AUTO: 9.8 FL (ref 8.5–12.5)
RBC # BLD AUTO: 3.46 MILL/UL (ref 4.4–6.2)
TIBC SERPL-MCNC: 233 UG/DL (ref 313–563)
TRANSFERRIN SERPL-MCNC: 187 MG/DL (ref 212–360)
WBC: 6.7 THOU/UL (ref 4–11)

## 2019-05-24 ENCOUNTER — COMMUNICATION - HEALTHEAST (OUTPATIENT)
Dept: ONCOLOGY | Facility: CLINIC | Age: 84
End: 2019-05-24

## 2019-06-14 ENCOUNTER — COMMUNICATION - HEALTHEAST (OUTPATIENT)
Dept: GERIATRICS | Facility: CLINIC | Age: 84
End: 2019-06-14

## 2019-06-14 DIAGNOSIS — D64.9 ANEMIA: ICD-10-CM

## 2019-06-25 ENCOUNTER — RECORDS - HEALTHEAST (OUTPATIENT)
Dept: ADMINISTRATIVE | Facility: OTHER | Age: 84
End: 2019-06-25

## 2019-06-26 ENCOUNTER — COMMUNICATION - HEALTHEAST (OUTPATIENT)
Dept: CARDIOLOGY | Facility: CLINIC | Age: 84
End: 2019-06-26

## 2019-08-06 ENCOUNTER — COMMUNICATION - HEALTHEAST (OUTPATIENT)
Dept: FAMILY MEDICINE | Facility: CLINIC | Age: 84
End: 2019-08-06

## 2019-08-14 ENCOUNTER — COMMUNICATION - HEALTHEAST (OUTPATIENT)
Dept: FAMILY MEDICINE | Facility: CLINIC | Age: 84
End: 2019-08-14

## 2019-11-17 ENCOUNTER — APPOINTMENT (OUTPATIENT)
Dept: CT IMAGING | Facility: CLINIC | Age: 84
End: 2019-11-17
Attending: EMERGENCY MEDICINE
Payer: MEDICARE

## 2019-11-17 ENCOUNTER — HOSPITAL ENCOUNTER (EMERGENCY)
Facility: CLINIC | Age: 84
Discharge: HOME | End: 2019-11-17
Attending: EMERGENCY MEDICINE | Admitting: EMERGENCY MEDICINE
Payer: MEDICARE

## 2019-11-17 VITALS
SYSTOLIC BLOOD PRESSURE: 137 MMHG | DIASTOLIC BLOOD PRESSURE: 99 MMHG | OXYGEN SATURATION: 98 % | TEMPERATURE: 96.3 F | RESPIRATION RATE: 18 BRPM | HEART RATE: 77 BPM
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
## 2019-11-17 DIAGNOSIS — S01.01XA LACERATION OF SCALP WITHOUT FOREIGN BODY, INITIAL ENCOUNTER: ICD-10-CM

## 2019-11-17 PROCEDURE — 70450 CT HEAD/BRAIN W/O DYE: CPT

## 2019-11-17 PROCEDURE — 12001 RPR S/N/AX/GEN/TRNK 2.5CM/<: CPT

## 2019-11-17 PROCEDURE — 99284 EMERGENCY DEPT VISIT MOD MDM: CPT | Mod: 25

## 2019-11-17 PROCEDURE — 72125 CT NECK SPINE W/O DYE: CPT

## 2019-11-17 PROCEDURE — 93005 ELECTROCARDIOGRAM TRACING: CPT

## 2019-11-17 RX ORDER — LIDOCAINE HYDROCHLORIDE AND EPINEPHRINE 10; 10 MG/ML; UG/ML
5 INJECTION, SOLUTION INFILTRATION; PERINEURAL ONCE
Status: DISCONTINUED | OUTPATIENT
Start: 2019-11-17 | End: 2019-11-17 | Stop reason: HOSPADM

## 2019-11-17 ASSESSMENT — ENCOUNTER SYMPTOMS
DYSURIA: 0
WOUND: 1
FREQUENCY: 0
HEADACHES: 0
ABDOMINAL PAIN: 0

## 2019-11-17 NOTE — ED NOTES
Bed: ED14  Expected date: 11/17/19  Expected time:   Means of arrival: Ambulance  Comments:  Marek Hale

## 2019-11-17 NOTE — ED PROVIDER NOTES
History     Chief Complaint:  Fall    HPI   Lopez Mcdermott is a 89 year old male with hx SDH, a-fib not on anticoagulation, aortic stenosis, among others presents following a fall. The patient states he was leaning backwards while trying to pick something up at home and he fell backwards. The patient suffered a wound to his scalp but states that nothing hurts. He denies chest pain, abdominal pain, a headache, dysuria, or increased urine frequency. He has a history of UTIs and does not feel like he has UTI symptoms. His tetanus is up to date.     Allergies:  No Known Drug Allergies     Medications:    Lopressor  Cardura    Past Medical History:    Subdural hematoma  Left femur fracture  Macdonald esophagus  Severe aortic stenosis  CKD  BPH  Gastrointestinal hemorrhage  Iron deficiency anemia  Atrial fibrillation  UTI    Past Surgical History:    Cervical fusion  Inguinal hernia repair  Hip pinning    Family History:    Heart disease  Arthritis  Asthma    Social History:  The patient was brought in via EMS.  Smoking Status: Former smoker  Smokeless Tobacco: Never Used  Alcohol Use: Negative  Marital Status:  Single    Review of Systems   Cardiovascular: Negative for chest pain.   Gastrointestinal: Negative for abdominal pain.   Genitourinary: Negative for dysuria and frequency.   Skin: Positive for wound.   Neurological: Negative for headaches.   All other systems reviewed and are negative.      Physical Exam     Patient Vitals for the past 24 hrs:   BP Temp Temp src Heart Rate Resp SpO2   11/17/19 1518 -- -- -- 71 -- --   11/17/19 1515 138/79 96.3  F (35.7  C) Oral -- 18 97 %      Physical Exam  Nursing note and vitals reviewed.  Constitutional: Cooperative.   HENT:   Mouth/Throat: Moist mucous membranes.   Eyes: EOMI, nonicteric sclera  Cardiovascular: Normal rate, irregularly irregular rhythm, 2+ systolic murmur noted.   Pulmonary/Chest: Effort normal and breath sounds normal. No respiratory distress. No wheezes.  No rales. No pain to palpation of chest.  Abdominal: Soft. Nontender, nondistended, no guarding or rigidity. BS present.   Musculoskeletal: Normal range of motion. No midline neck/T/L spine tenderness.   No pain to palpation of pelvis/extremities.   Neurological: Alert. Moves all extremities spontaneously. Equal strength BUE/BLE.   Skin: Skin is warm and dry. No rash noted. 2cm linear laceration to very top of scalp.   Psychiatric: Normal mood and affect.      Emergency Department Course     ECG:  ECG taken at 1522  Atrial fibrillation with premature ventricular or aberrantly conducted complexes  Rightward axis  Low voltage QRS  Septal infarct, age undetermined  Abnormal ECG  Rate 79 bpm. MI interval * ms. QRS duration 96 ms. QT/QTc 420/472 ms. P-R-T axes * 98 124.    Imaging:  Radiology findings were communicated with the patient who voiced understanding of the findings.    Cervical spine CT w/o contrast   1. No evidence of an acute cervical spine fracture.  2. Vertebral body heights maintained and there is a mild anterior listhesis of C7 in relation to T1.  3.  Solid anterior bony fusion from C4 through C7.  4.  Diffuse degenerative changes throughout cervical region as described above.    Head CT w/o contrast  1.  No CT finding of a mass, hemorrhage or focal area suggestive of acute infarct.  2.  Diffuse age related changes along with old lacunar infarcts left caudate head and anterior left basal ganglia.      Procedures    Laceration Repair        LACERATION:  A superficial clean 2 cm laceration.      LOCATION:  Posterior scalp at vertex.      ANESTHESIA:  Local using Lidocaine 1% with epinephrine total of 2 mLs      PREPARATION:  Irrigation with Normal Saline      DEBRIDEMENT:  no debridement      CLOSURE:  Wound was closed with 5 Staples. No complications.      Emergency Department Course:    1515 Nursing notes and vitals reviewed.    1522 EKG obtained as noted above.     1545 I performed an exam of the  patient as documented above.     1656 I performed the laceration repair procedure as documented above.     1704 The patient was sent for CTs while in the emergency department, results above.     1758 Patient rechecked and updated.  He'll be discharged home.Care staff is coming to pick him up.     Impression & Plan      Medical Decision Making:  Lopez Mcdermott is a 89 year old male who presents to the emergency department today for evaluation of fall and head laceration.  This was clearly a mechanical fall as patient has memory of the fall and what he was doing.  He reports he lost his balance.  Patient is actually not on anticoagulation due to previous similar falls in the past.  CT head and neck obtained which are negative for bleed or fracture.  His laceration was repaired as above.  Recommended that he follow-up with primary care provider for staple removal in the next 7 to 10 days, and sooner were he to develop new symptoms.  Emphasized return to emergency department for severe headache, confusion, vomiting, or for any other concerns.  He is discharged in stable condition.  All questions answered.    Diagnosis:    ICD-10-CM    1. Fall, initial encounter W19.XXXA    2. Laceration of scalp without foreign body, initial encounter S01.01XA      Disposition:   The patient is discharged to home.     Scribe Disclosure:  I, Francisco Quintanilla, am serving as a scribe at 3:14 PM on 11/17/2019 to document services personally performed by Silvano Castellanos MD based on my observations and the provider's statements to me.  [facility] EMERGENCY DEPARTMENT       Silvano Castellanos MD  11/17/19 1380

## 2019-11-17 NOTE — ED AVS SNAPSHOT
[facility] Emergency Department  [address]  [facility] [address]  Phone:  [phone]  Fax:  [phone]                                    Lopez Mcdermott   MRN: [id number]    Department:  [facility] Emergency Department   Date of Visit:  11/17/2019           After Visit Summary Signature Page    I have received my discharge instructions, and my questions have been answered. I have discussed any challenges I see with this plan with the nurse or doctor.    ..........................................................................................................................................  Patient/Patient Representative Signature      ..........................................................................................................................................  Patient Representative Print Name and Relationship to Patient    ..................................................               ................................................  Date                                   Time    ..........................................................................................................................................  Reviewed by Signature/Title    ...................................................              ..............................................  Date                                               Time          22EPIC Rev 08/18

## 2019-11-17 NOTE — ED TRIAGE NOTES
Patient presents to ED via EMS from assisted living due to fall. unwittnssed fall, laceration noted on top of head .     On arrival patient in c- collar.   Hx subdural   A/o x3

## 2019-11-18 LAB — INTERPRETATION ECG - MUSE: NORMAL

## 2019-11-25 ENCOUNTER — HOSPITAL ENCOUNTER (INPATIENT)
Facility: CLINIC | Age: 84
LOS: 5 days | Discharge: HOME HEALTH | DRG: 292 | End: 2019-11-30
Attending: EMERGENCY MEDICINE | Admitting: INTERNAL MEDICINE
Payer: MEDICARE

## 2019-11-25 ENCOUNTER — APPOINTMENT (OUTPATIENT)
Dept: ULTRASOUND IMAGING | Facility: CLINIC | Age: 84
DRG: 292 | End: 2019-11-25
Attending: INTERNAL MEDICINE
Payer: MEDICARE

## 2019-11-25 ENCOUNTER — APPOINTMENT (OUTPATIENT)
Dept: GENERAL RADIOLOGY | Facility: CLINIC | Age: 84
DRG: 292 | End: 2019-11-25
Attending: EMERGENCY MEDICINE
Payer: MEDICARE

## 2019-11-25 ENCOUNTER — APPOINTMENT (OUTPATIENT)
Dept: ULTRASOUND IMAGING | Facility: CLINIC | Age: 84
DRG: 292 | End: 2019-11-25
Attending: EMERGENCY MEDICINE
Payer: MEDICARE

## 2019-11-25 DIAGNOSIS — I50.33 ACUTE ON CHRONIC DIASTOLIC CONGESTIVE HEART FAILURE (H): Primary | ICD-10-CM

## 2019-11-25 DIAGNOSIS — R53.81 PHYSICAL DECONDITIONING: ICD-10-CM

## 2019-11-25 DIAGNOSIS — R18.8 OTHER ASCITES: ICD-10-CM

## 2019-11-25 DIAGNOSIS — J90 PLEURAL EFFUSION: ICD-10-CM

## 2019-11-25 DIAGNOSIS — I48.91 ATRIAL FIBRILLATION, UNSPECIFIED TYPE (H): ICD-10-CM

## 2019-11-25 DIAGNOSIS — I50.9 NEW ONSET OF CONGESTIVE HEART FAILURE (H): ICD-10-CM

## 2019-11-25 DIAGNOSIS — R79.89 ELEVATED TROPONIN: ICD-10-CM

## 2019-11-25 LAB
ALBUMIN SERPL-MCNC: 4 G/DL (ref 3.4–5)
ALP SERPL-CCNC: 92 U/L (ref 40–150)
ALT SERPL W P-5'-P-CCNC: 21 U/L (ref 0–70)
ANION GAP SERPL CALCULATED.3IONS-SCNC: 5 MMOL/L (ref 3–14)
AST SERPL W P-5'-P-CCNC: 29 U/L (ref 0–45)
BASOPHILS # BLD AUTO: 0 10E9/L (ref 0–0.2)
BASOPHILS NFR BLD AUTO: 0.4 %
BILIRUB SERPL-MCNC: 1.1 MG/DL (ref 0.2–1.3)
BUN SERPL-MCNC: 22 MG/DL (ref 7–30)
CALCIUM SERPL-MCNC: 8.6 MG/DL (ref 8.5–10.1)
CHLORIDE SERPL-SCNC: 108 MMOL/L (ref 94–109)
CO2 SERPL-SCNC: 29 MMOL/L (ref 20–32)
CREAT SERPL-MCNC: 1.48 MG/DL (ref 0.66–1.25)
DIFFERENTIAL METHOD BLD: ABNORMAL
EOSINOPHIL # BLD AUTO: 0.2 10E9/L (ref 0–0.7)
EOSINOPHIL NFR BLD AUTO: 2.6 %
ERYTHROCYTE [DISTWIDTH] IN BLOOD BY AUTOMATED COUNT: 16.2 % (ref 10–15)
GFR SERPL CREATININE-BSD FRML MDRD: 41 ML/MIN/{1.73_M2}
GLUCOSE SERPL-MCNC: 95 MG/DL (ref 70–99)
HCT VFR BLD AUTO: 36.7 % (ref 40–53)
HGB BLD-MCNC: 11.7 G/DL (ref 13.3–17.7)
IMM GRANULOCYTES # BLD: 0 10E9/L (ref 0–0.4)
IMM GRANULOCYTES NFR BLD: 0.4 %
INR PPP: 1.23 (ref 0.86–1.14)
LYMPHOCYTES # BLD AUTO: 1.2 10E9/L (ref 0.8–5.3)
LYMPHOCYTES NFR BLD AUTO: 15.9 %
MCH RBC QN AUTO: 32.7 PG (ref 26.5–33)
MCHC RBC AUTO-ENTMCNC: 31.9 G/DL (ref 31.5–36.5)
MCV RBC AUTO: 103 FL (ref 78–100)
MONOCYTES # BLD AUTO: 0.6 10E9/L (ref 0–1.3)
MONOCYTES NFR BLD AUTO: 8.4 %
NEUTROPHILS # BLD AUTO: 5.5 10E9/L (ref 1.6–8.3)
NEUTROPHILS NFR BLD AUTO: 72.3 %
NRBC # BLD AUTO: 0 10*3/UL
NRBC BLD AUTO-RTO: 0 /100
NT-PROBNP SERPL-MCNC: ABNORMAL PG/ML (ref 0–1800)
PLATELET # BLD AUTO: 147 10E9/L (ref 150–450)
POTASSIUM SERPL-SCNC: 4 MMOL/L (ref 3.4–5.3)
PROT SERPL-MCNC: 7.6 G/DL (ref 6.8–8.8)
RBC # BLD AUTO: 3.58 10E12/L (ref 4.4–5.9)
SODIUM SERPL-SCNC: 142 MMOL/L (ref 133–144)
TROPONIN I SERPL-MCNC: 0.05 UG/L (ref 0–0.04)
TROPONIN I SERPL-MCNC: 0.06 UG/L (ref 0–0.04)
WBC # BLD AUTO: 7.7 10E9/L (ref 4–11)

## 2019-11-25 PROCEDURE — 85610 PROTHROMBIN TIME: CPT | Performed by: EMERGENCY MEDICINE

## 2019-11-25 PROCEDURE — 36415 COLL VENOUS BLD VENIPUNCTURE: CPT | Performed by: INTERNAL MEDICINE

## 2019-11-25 PROCEDURE — 85025 COMPLETE CBC W/AUTO DIFF WBC: CPT | Performed by: EMERGENCY MEDICINE

## 2019-11-25 PROCEDURE — 84484 ASSAY OF TROPONIN QUANT: CPT | Performed by: EMERGENCY MEDICINE

## 2019-11-25 PROCEDURE — 83880 ASSAY OF NATRIURETIC PEPTIDE: CPT | Performed by: EMERGENCY MEDICINE

## 2019-11-25 PROCEDURE — 76705 ECHO EXAM OF ABDOMEN: CPT

## 2019-11-25 PROCEDURE — 99285 EMERGENCY DEPT VISIT HI MDM: CPT | Mod: 25

## 2019-11-25 PROCEDURE — 25000132 ZZH RX MED GY IP 250 OP 250 PS 637: Mod: GY | Performed by: INTERNAL MEDICINE

## 2019-11-25 PROCEDURE — 99223 1ST HOSP IP/OBS HIGH 75: CPT | Mod: AI | Performed by: INTERNAL MEDICINE

## 2019-11-25 PROCEDURE — 93971 EXTREMITY STUDY: CPT | Mod: RT

## 2019-11-25 PROCEDURE — 93308 TTE F-UP OR LMTD: CPT

## 2019-11-25 PROCEDURE — 25000128 H RX IP 250 OP 636: Performed by: EMERGENCY MEDICINE

## 2019-11-25 PROCEDURE — 25000132 ZZH RX MED GY IP 250 OP 250 PS 637: Mod: GY | Performed by: EMERGENCY MEDICINE

## 2019-11-25 PROCEDURE — 84484 ASSAY OF TROPONIN QUANT: CPT | Performed by: INTERNAL MEDICINE

## 2019-11-25 PROCEDURE — 12000000 ZZH R&B MED SURG/OB

## 2019-11-25 PROCEDURE — 96374 THER/PROPH/DIAG INJ IV PUSH: CPT

## 2019-11-25 PROCEDURE — 80053 COMPREHEN METABOLIC PANEL: CPT | Performed by: EMERGENCY MEDICINE

## 2019-11-25 PROCEDURE — 93005 ELECTROCARDIOGRAM TRACING: CPT

## 2019-11-25 PROCEDURE — 71046 X-RAY EXAM CHEST 2 VIEWS: CPT

## 2019-11-25 RX ORDER — FUROSEMIDE 10 MG/ML
40 INJECTION INTRAMUSCULAR; INTRAVENOUS ONCE
Status: COMPLETED | OUTPATIENT
Start: 2019-11-25 | End: 2019-11-25

## 2019-11-25 RX ORDER — SODIUM CHLORIDE FOR INHALATION 3 %
3 VIAL, NEBULIZER (ML) INHALATION 2 TIMES DAILY
COMMUNITY

## 2019-11-25 RX ORDER — FOLIC ACID 1 MG/1
1 TABLET ORAL DAILY
COMMUNITY

## 2019-11-25 RX ORDER — POTASSIUM CHLORIDE 1500 MG/1
20-40 TABLET, EXTENDED RELEASE ORAL
Status: DISCONTINUED | OUTPATIENT
Start: 2019-11-25 | End: 2019-11-30 | Stop reason: HOSPADM

## 2019-11-25 RX ORDER — TAMSULOSIN HYDROCHLORIDE 0.4 MG/1
0.4 CAPSULE ORAL DAILY
Status: DISCONTINUED | OUTPATIENT
Start: 2019-11-26 | End: 2019-11-30 | Stop reason: HOSPADM

## 2019-11-25 RX ORDER — AMOXICILLIN 250 MG
1 CAPSULE ORAL 2 TIMES DAILY PRN
Status: DISCONTINUED | OUTPATIENT
Start: 2019-11-25 | End: 2019-11-30 | Stop reason: HOSPADM

## 2019-11-25 RX ORDER — METOPROLOL SUCCINATE 25 MG/1
25 TABLET, EXTENDED RELEASE ORAL DAILY
COMMUNITY

## 2019-11-25 RX ORDER — ONDANSETRON 4 MG/1
4 TABLET, ORALLY DISINTEGRATING ORAL EVERY 6 HOURS PRN
Status: DISCONTINUED | OUTPATIENT
Start: 2019-11-25 | End: 2019-11-30 | Stop reason: HOSPADM

## 2019-11-25 RX ORDER — PANTOPRAZOLE SODIUM 40 MG/1
40 TABLET, DELAYED RELEASE ORAL DAILY
Status: DISCONTINUED | OUTPATIENT
Start: 2019-11-26 | End: 2019-11-30 | Stop reason: HOSPADM

## 2019-11-25 RX ORDER — LIDOCAINE HYDROCHLORIDE 20 MG/ML
20 JELLY TOPICAL
Status: DISCONTINUED | OUTPATIENT
Start: 2019-11-25 | End: 2019-11-25

## 2019-11-25 RX ORDER — DOXAZOSIN 4 MG/1
4 TABLET ORAL AT BEDTIME
Status: DISCONTINUED | OUTPATIENT
Start: 2019-11-25 | End: 2019-11-30 | Stop reason: HOSPADM

## 2019-11-25 RX ORDER — DOXAZOSIN 4 MG/1
4 TABLET ORAL AT BEDTIME
COMMUNITY

## 2019-11-25 RX ORDER — MAGNESIUM SULFATE HEPTAHYDRATE 40 MG/ML
4 INJECTION, SOLUTION INTRAVENOUS EVERY 4 HOURS PRN
Status: DISCONTINUED | OUTPATIENT
Start: 2019-11-25 | End: 2019-11-30 | Stop reason: HOSPADM

## 2019-11-25 RX ORDER — PANTOPRAZOLE SODIUM 40 MG/1
40 TABLET, DELAYED RELEASE ORAL DAILY
COMMUNITY

## 2019-11-25 RX ORDER — ASPIRIN 325 MG
325 TABLET ORAL ONCE
Status: COMPLETED | OUTPATIENT
Start: 2019-11-25 | End: 2019-11-25

## 2019-11-25 RX ORDER — ASPIRIN 81 MG/1
81 TABLET ORAL DAILY
Status: DISCONTINUED | OUTPATIENT
Start: 2019-11-26 | End: 2019-11-30 | Stop reason: HOSPADM

## 2019-11-25 RX ORDER — POTASSIUM CHLORIDE 1.5 G/1.58G
20-40 POWDER, FOR SOLUTION ORAL
Status: DISCONTINUED | OUTPATIENT
Start: 2019-11-25 | End: 2019-11-30 | Stop reason: HOSPADM

## 2019-11-25 RX ORDER — LIDOCAINE 40 MG/G
CREAM TOPICAL
Status: DISCONTINUED | OUTPATIENT
Start: 2019-11-25 | End: 2019-11-30 | Stop reason: HOSPADM

## 2019-11-25 RX ORDER — ONDANSETRON 2 MG/ML
4 INJECTION INTRAMUSCULAR; INTRAVENOUS EVERY 6 HOURS PRN
Status: DISCONTINUED | OUTPATIENT
Start: 2019-11-25 | End: 2019-11-30 | Stop reason: HOSPADM

## 2019-11-25 RX ORDER — ACETAMINOPHEN 500 MG
1000 TABLET ORAL EVERY 6 HOURS PRN
COMMUNITY

## 2019-11-25 RX ORDER — POTASSIUM CL/LIDO/0.9 % NACL 10MEQ/0.1L
10 INTRAVENOUS SOLUTION, PIGGYBACK (ML) INTRAVENOUS
Status: DISCONTINUED | OUTPATIENT
Start: 2019-11-25 | End: 2019-11-30 | Stop reason: HOSPADM

## 2019-11-25 RX ORDER — POLYETHYLENE GLYCOL 3350 17 G/17G
17 POWDER, FOR SOLUTION ORAL DAILY PRN
Status: DISCONTINUED | OUTPATIENT
Start: 2019-11-25 | End: 2019-11-30 | Stop reason: HOSPADM

## 2019-11-25 RX ORDER — NALOXONE HYDROCHLORIDE 0.4 MG/ML
.1-.4 INJECTION, SOLUTION INTRAMUSCULAR; INTRAVENOUS; SUBCUTANEOUS
Status: DISCONTINUED | OUTPATIENT
Start: 2019-11-25 | End: 2019-11-30 | Stop reason: HOSPADM

## 2019-11-25 RX ORDER — FUROSEMIDE 10 MG/ML
20 INJECTION INTRAMUSCULAR; INTRAVENOUS
Status: DISCONTINUED | OUTPATIENT
Start: 2019-11-26 | End: 2019-11-29

## 2019-11-25 RX ORDER — AMOXICILLIN 250 MG
2 CAPSULE ORAL 2 TIMES DAILY PRN
Status: DISCONTINUED | OUTPATIENT
Start: 2019-11-25 | End: 2019-11-30 | Stop reason: HOSPADM

## 2019-11-25 RX ORDER — POTASSIUM CHLORIDE 29.8 MG/ML
20 INJECTION INTRAVENOUS
Status: DISCONTINUED | OUTPATIENT
Start: 2019-11-25 | End: 2019-11-25

## 2019-11-25 RX ORDER — ACETAMINOPHEN 325 MG/1
650 TABLET ORAL EVERY 4 HOURS PRN
Status: DISCONTINUED | OUTPATIENT
Start: 2019-11-25 | End: 2019-11-30 | Stop reason: HOSPADM

## 2019-11-25 RX ORDER — FOLIC ACID 1 MG/1
1 TABLET ORAL DAILY
Status: DISCONTINUED | OUTPATIENT
Start: 2019-11-26 | End: 2019-11-30 | Stop reason: HOSPADM

## 2019-11-25 RX ORDER — POTASSIUM CHLORIDE 7.45 MG/ML
10 INJECTION INTRAVENOUS
Status: DISCONTINUED | OUTPATIENT
Start: 2019-11-25 | End: 2019-11-30 | Stop reason: HOSPADM

## 2019-11-25 RX ORDER — METOPROLOL SUCCINATE 25 MG/1
25 TABLET, EXTENDED RELEASE ORAL DAILY
Status: DISCONTINUED | OUTPATIENT
Start: 2019-11-26 | End: 2019-11-30 | Stop reason: HOSPADM

## 2019-11-25 RX ADMIN — FUROSEMIDE 40 MG: 10 INJECTION, SOLUTION INTRAMUSCULAR; INTRAVENOUS at 20:15

## 2019-11-25 RX ADMIN — ASPIRIN 325 MG ORAL TABLET 325 MG: 325 PILL ORAL at 20:15

## 2019-11-25 RX ADMIN — DOXAZOSIN 4 MG: 4 TABLET ORAL at 22:28

## 2019-11-25 ASSESSMENT — ACTIVITIES OF DAILY LIVING (ADL)
NUMBER_OF_TIMES_PATIENT_HAS_FALLEN_WITHIN_LAST_SIX_MONTHS: 1
RETIRED_EATING: 0-->INDEPENDENT
DRESS: 1-->ASSISTIVE EQUIPMENT
TOILETING: 1-->ASSISTIVE EQUIPMENT
RETIRED_COMMUNICATION: 0-->UNDERSTANDS/COMMUNICATES WITHOUT DIFFICULTY
SWALLOWING: 0-->SWALLOWS FOODS/LIQUIDS WITHOUT DIFFICULTY
WHICH_OF_THE_ABOVE_FUNCTIONAL_RISKS_HAD_A_RECENT_ONSET_OR_CHANGE?: AMBULATION;TRANSFERRING;TOILETING;BATHING
TRANSFERRING: 1-->ASSISTIVE EQUIPMENT
AMBULATION: 1-->ASSISTIVE EQUIPMENT
FALL_HISTORY_WITHIN_LAST_SIX_MONTHS: YES
COGNITION: 0 - NO COGNITION ISSUES REPORTED
BATHING: 1-->ASSISTIVE EQUIPMENT

## 2019-11-25 ASSESSMENT — ENCOUNTER SYMPTOMS
ABDOMINAL DISTENTION: 1
ABDOMINAL PAIN: 1
SHORTNESS OF BREATH: 0

## 2019-11-25 ASSESSMENT — MIFFLIN-ST. JEOR: SCORE: 1632.41

## 2019-11-25 NOTE — LETTER
ransition Communication Hand-off for Care Transitions to Next Level of Care Provider    Name: Lopez Mcdermott  : 1929  MRN #: [id number]  Primary Care Provider: Michelle Gary  Primary Care MD Name: Michelle Gary  Primary Clinic: [facility] [address]  [facility] [address]  Primary Care Clinic Name: [facility]  Reason for Hospitalization:  Pleural effusion [J90]  Elevated troponin [R79.89]  Other ascites [R18.8]  Atrial fibrillation, unspecified type (H) [I48.91]  New onset of congestive heart failure (H) [I50.9]  Admit Date/Time: 2019  5:01 PM  Discharge Date: 2019  Payor Source: Payor: MEDICARE / Plan: MEDICARE / Product Type: Medicare /     Reason for Communication Hand-off Referral: Admission diagnoses: CHF    Any outstanding tests or procedures:        Referrals     Future Labs/Procedures    Home care nursing referral     Comments:    RN skilled nursing visit. RN to assess vital signs and weight, respiratory and cardiac status, patients ability to take and record daily blood pressure, temp and weight, hydration, nutrition and bowel status and home safety.    Your provider has ordered home care nursing services. If you have not been contacted within 2 days of your discharge please call the inpatient department phone number at [phone] .    Home Care OT Referral for Hospital Discharge     Comments:    OT to eval and treat    Your provider has ordered home care - occupational therapy. If you have not been contacted within 2 days of your discharge please call the department phone number listed on the top of this document.    Home Care PT Referral for Hospital Discharge     Comments:    PT to eval and treat    Your provider has ordered home care - physical therapy. If you have not been contacted within 2 days of your discharge please call the department phone number listed on the top of this document.              Key Recommendations:  Pt was admitted  for CHF exacerbation and abd pain. He has a known AAA. Abd US was done to rule out need for paracentesis. [place] [address] on admission and troponin of 0.055. Jimenez was placed and IV lasix given. His echo shows 45-50%. PT/OT will assess for discharge recommendations. Anticipate pt will return to [facility] with possible home care. He will need reinforcement of CHF education along with reinforcement to staff at his residence.    discharge recommendations are: home with homecare    Caren Andres RN    AVS/Discharge Summary is the source of truth; this is a helpful guide for improved communication of patient story

## 2019-11-25 NOTE — ED PROVIDER NOTES
History     Chief Complaint:  Abdominal Pain    HPI   Lopez Mcdermott is a 89 year old male who presents to the emergency department today with abdominal pain. The patient fell a few days ago and was see in the ED with a negative workup. Since discharge, they noticed recent weight gain and abdominal firmness. He had an abdominal CT done Friday (3 days ago) that resulted today showing extensive ascites with cirrhotic appearance of liver (results below), so was told to come in for further testing. Today the patient continues to have bloating, which has been present for at least 4 days but no active abdominal pain. He denies chest pain, shortness of breath.The patient lives at the [facility] and [facility] come to their house. Staff from the home notes that he has appeared dyspneic with exertion. Patient takes Tylenol x2 at night. The patient has minimal alcohol intake.     CT Abdomen and Pelvis with contrast   Conclusion:   1. Extensive ascites and soft tissue edema with possible cirrhotic appearance of liver.   2. Infrarenal abdominal aortic aneurysm measuring 51x50 mm maximal transverse luminal dimensions.   3. No additional acute CT abnormality of the abdomen or pelvis.      Allergies:  No Known Drug Allergies     Medications:    Pericolace  Keppra  Folvite  Flomax  Imodium   Lopressor  Cardura  Protonix  Flonase    Past Medical History:    Subdural hematoma   Fracture of left femur   Macdonald esophagus   Severe aortic stenosis   CKD  Benign prostatic hyperplasia   Gastrointestinal hemorrhage  Iron deficiency anemia   Atrial fibrillation   Hypertension   Subarachnoid hemorrhage     Past Surgical History:    Cervical fusion   Esophagogastroduodenoscopy  Inguinal hernia repair   Colonoscopy  Transurethral resection of prostate  HIP pinning     Family History:    Heart disease  Arthritis  Asthma     Social History:  The patient was accompanied to the ED by staff from [facility], where patient  lives.  Alcohol Use: Yes, but minimal    Marital Status:  Single    Review of Systems   Respiratory: Negative for shortness of breath.    Cardiovascular: Negative for chest pain.   Gastrointestinal: Positive for abdominal distention and abdominal pain.   All other systems reviewed and are negative.    Physical Exam     Patient Vitals for the past 24 hrs:   BP Temp Temp src Heart Rate Resp SpO2   11/25/19 2024 -- -- -- 69 -- 95 %   11/25/19 2023 -- -- -- 72 -- 96 %   11/25/19 2022 -- -- -- 71 -- 98 %   11/25/19 1640 (!) 149/99 97.5  F (36.4  C) Oral 88 24 96 %      Physical Exam    General:   Pleasant, age appropriate male resting comfortably.  HEENT:    Oropharynx is moist, without lesions or trismus.  Eyes:    Conjunctiva normal  Neck:    Supple, no meningismus.     CV:     Regular rate, irregular rhythm.      Grade 4/6 systolic murmurs     No rubs or gallops.       Right unilateral leg swelling.       2+ radial pulses bilateral.    PULM:    Inspiratory rales at right base.     No respiratory distress.      Good air exchange.     No stridor.  ABD:    Firm, non-tender     Mildly distended.      Dull to percussion      No pulsatile masses.       No rebound, guarding or rigidity.  MSK:     No gross deformity to all four extremities.   LYMPH:   No cervical lymphadenopathy.  NEURO:   Alert. Good muscle tone, no atrophy.  Skin:    Warm, dry and intact.    Psych:    Mood is good and affect is appropriate.      Emergency Department Course     ECG:  Indication: abdominal pain  Completed at 1846.  Read at 1849.   Atrial fibrillation with premature ventricular or aberrantly conducted complexes   Right axis deviation  Low voltage QRS   Septal infarct, age undetermined   Abnormal ECG   No significant change compared to 11/17/19  Rate 81 bpm. VA interval *. QRS duration 100. QT/QTc 410/476. P-R-T axes * 118 50.     Imaging:  Radiology findings were communicated with the patient and family who voiced understanding of the  findings.  POC US ECHO LIMITED   Final Result   [facility] Procedure Note        Limited Bedside ED Cardiac Ultrasound:      PROCEDURE: PERFORMED BY: Dr. Geo Benitez MD   INDICATIONS/SYMPTOM:  Shortness of Breath   PROBE: Cardiac phased array probe   BODY LOCATION: Chest   FINDINGS:    The ultrasound was performed utilizing the parasternal long axis, parasternal short axis and apical 4 chamber views.   Cardiac contractility:  Present   Gross estimation of cardiac kinesis: depressed   Pericardial Effusion:  None   RV:LV ratio: LV > RV      INTERPRETATION:    Systolic function mildly depressed.  No pericardial effusion was found.     IMAGE DOCUMENTATION: Images were archived to PACs system.              Chest XR,  PA & LAT   Final Result   IMPRESSION: Moderate right and small left pleural effusions. No pneumothorax. Partial collapse of the right middle and lower lobes suggested. Pulmonary vasculature within normal limits. Left lung clear. Normal heart size. Normal mediastinal contour.      US Lower Extremity Venous Duplex Right   Final Result   IMPRESSION:   1.  No deep venous thrombosis in the right lower extremity.      Echocardiogram Complete    (Results Pending)   US Abdomen Limited    (Results Pending)   Report per radiology      Laboratory:  Laboratory findings were communicated with the patient and family who voiced understanding of the findings.  Troponin (Collected 1726): 0.055   BNP: 16,758   INR: 1.23   CMP: 41 GFR o/w WNL (Creatinine 1.48)   CBC: AWNL (WBC 7.7, HGB 11.7, )      Interventions:  2015: Aspirin 325 mg PO   2015: Lasix 40 mg IV     Emergency Department Course:  Nursing notes and vitals reviewed.  1717: I performed an exam of the patient as documented above.   IV was inserted and blood was drawn for laboratory testing, results above.  I performed a  POC US Echo  The patient was sent for a Chest XR, US lower Extremity Venous Duplex while in the emergency department,  results above.   1957: Findings and plan explained to the Patient and spouse who consents to admission. Discussed the patient with Dr. Green, who will admit the patient to a Cardiac Tele bed for further monitoring, evaluation, and treatment.   I personally reviewed the laboratory and imaging results with the Patient and spouse and answered all related questions prior to admission.     Impression & Plan    Medical Decision Makin-year-old male referred to the ED for concerns of ascites and possible cirrhosis seen on CT scan done for traumatic reasons.  Patient's evaluation is not consistent with cirrhosis.  Liver function tests are unremarkable.  Patient does have signs of volume overload.  He does have mild renal insufficiency but not to level that would account for the anasarca and ascites.  Concern morena for congestive heart failure.  EKG reveals rate controlled atrial fibrillation.  Bedside ultrasound reveals mild depression of his systolic function.  BNP is elevated.  He has mild elevation of troponin.  He has no active chest pain or shortness of breath to signal ACS.  Elevated troponin is likely related to CHF.  Patient given aspirin but no indication for heparin.  Patient will be transferred to a monitored bed for new onset congestive heart failure.    Diagnosis:    ICD-10-CM    1. New onset of congestive heart failure (H) I50.9    2. Elevated troponin R79.89    3. Atrial fibrillation, unspecified type (H) I48.91    4. Other ascites R18.8    5. Pleural effusion J90        Disposition:  Admitted to Cardiac Tele      Scribe Disclosure:  Ijeoma SHEPHERD MD, am serving as a scribe at 5:22 PM on 2019 to document services personally performed by Geo Benitez MD based on my observations and the provider's statements to me.    2019   [facility] EMERGENCY DEPARTMENT       Geo Benitez MD  19 2366

## 2019-11-25 NOTE — ED TRIAGE NOTES
Patient presents with abdominal pain/bloating. Patient was having weight gain and abdominal firmness so had a CT done on Friday. Patient got read back and has extensive ascites with cirrhotic appearance of liver, so was told to come in for further testing. ABCDs intact, alert and oriented x 4.

## 2019-11-26 ENCOUNTER — APPOINTMENT (OUTPATIENT)
Dept: CARDIOLOGY | Facility: CLINIC | Age: 84
DRG: 292 | End: 2019-11-26
Attending: INTERNAL MEDICINE
Payer: MEDICARE

## 2019-11-26 LAB
ANION GAP SERPL CALCULATED.3IONS-SCNC: 6 MMOL/L (ref 3–14)
BUN SERPL-MCNC: 23 MG/DL (ref 7–30)
CALCIUM SERPL-MCNC: 8.6 MG/DL (ref 8.5–10.1)
CHLORIDE SERPL-SCNC: 106 MMOL/L (ref 94–109)
CHOLEST SERPL-MCNC: 113 MG/DL
CO2 SERPL-SCNC: 28 MMOL/L (ref 20–32)
CREAT SERPL-MCNC: 1.45 MG/DL (ref 0.66–1.25)
ERYTHROCYTE [DISTWIDTH] IN BLOOD BY AUTOMATED COUNT: 16.4 % (ref 10–15)
GFR SERPL CREATININE-BSD FRML MDRD: 42 ML/MIN/{1.73_M2}
GLUCOSE SERPL-MCNC: 83 MG/DL (ref 70–99)
HCT VFR BLD AUTO: 37.6 % (ref 40–53)
HDLC SERPL-MCNC: 64 MG/DL
HGB BLD-MCNC: 11.4 G/DL (ref 13.3–17.7)
INTERPRETATION ECG - MUSE: NORMAL
LDLC SERPL CALC-MCNC: 42 MG/DL
MAGNESIUM SERPL-MCNC: 2 MG/DL (ref 1.6–2.3)
MCH RBC QN AUTO: 32.6 PG (ref 26.5–33)
MCHC RBC AUTO-ENTMCNC: 30.3 G/DL (ref 31.5–36.5)
MCV RBC AUTO: 107 FL (ref 78–100)
NONHDLC SERPL-MCNC: 49 MG/DL
PLATELET # BLD AUTO: 128 10E9/L (ref 150–450)
POTASSIUM SERPL-SCNC: 3.7 MMOL/L (ref 3.4–5.3)
RBC # BLD AUTO: 3.5 10E12/L (ref 4.4–5.9)
SODIUM SERPL-SCNC: 140 MMOL/L (ref 133–144)
TRIGL SERPL-MCNC: 37 MG/DL
TROPONIN I SERPL-MCNC: 0.05 UG/L (ref 0–0.04)
WBC # BLD AUTO: 6.7 10E9/L (ref 4–11)

## 2019-11-26 PROCEDURE — 84484 ASSAY OF TROPONIN QUANT: CPT | Performed by: INTERNAL MEDICINE

## 2019-11-26 PROCEDURE — 25000128 H RX IP 250 OP 636: Performed by: INTERNAL MEDICINE

## 2019-11-26 PROCEDURE — 36415 COLL VENOUS BLD VENIPUNCTURE: CPT | Performed by: INTERNAL MEDICINE

## 2019-11-26 PROCEDURE — 25000132 ZZH RX MED GY IP 250 OP 250 PS 637: Mod: GY | Performed by: INTERNAL MEDICINE

## 2019-11-26 PROCEDURE — 40000893 ZZH STATISTIC PT IP EVAL DEFER

## 2019-11-26 PROCEDURE — 83735 ASSAY OF MAGNESIUM: CPT | Performed by: INTERNAL MEDICINE

## 2019-11-26 PROCEDURE — 85027 COMPLETE CBC AUTOMATED: CPT | Performed by: INTERNAL MEDICINE

## 2019-11-26 PROCEDURE — 80061 LIPID PANEL: CPT | Performed by: INTERNAL MEDICINE

## 2019-11-26 PROCEDURE — 93306 TTE W/DOPPLER COMPLETE: CPT

## 2019-11-26 PROCEDURE — 80048 BASIC METABOLIC PNL TOTAL CA: CPT | Performed by: INTERNAL MEDICINE

## 2019-11-26 PROCEDURE — 93306 TTE W/DOPPLER COMPLETE: CPT | Mod: 26 | Performed by: INTERNAL MEDICINE

## 2019-11-26 PROCEDURE — 99233 SBSQ HOSP IP/OBS HIGH 50: CPT | Performed by: INTERNAL MEDICINE

## 2019-11-26 PROCEDURE — 12000000 ZZH R&B MED SURG/OB

## 2019-11-26 RX ADMIN — FOLIC ACID 1 MG: 1 TABLET ORAL at 08:33

## 2019-11-26 RX ADMIN — METOPROLOL SUCCINATE 25 MG: 25 TABLET, EXTENDED RELEASE ORAL at 08:33

## 2019-11-26 RX ADMIN — FUROSEMIDE 20 MG: 10 INJECTION, SOLUTION INTRAMUSCULAR; INTRAVENOUS at 08:33

## 2019-11-26 RX ADMIN — PANTOPRAZOLE SODIUM 40 MG: 40 TABLET, DELAYED RELEASE ORAL at 08:33

## 2019-11-26 RX ADMIN — FUROSEMIDE 20 MG: 10 INJECTION, SOLUTION INTRAMUSCULAR; INTRAVENOUS at 16:23

## 2019-11-26 RX ADMIN — DOXAZOSIN 4 MG: 4 TABLET ORAL at 21:21

## 2019-11-26 RX ADMIN — TAMSULOSIN HYDROCHLORIDE 0.4 MG: 0.4 CAPSULE ORAL at 08:33

## 2019-11-26 RX ADMIN — ASPIRIN 81 MG: 81 TABLET, COATED ORAL at 08:33

## 2019-11-26 ASSESSMENT — ACTIVITIES OF DAILY LIVING (ADL)
ADLS_ACUITY_SCORE: 20
ADLS_ACUITY_SCORE: 21
ADLS_ACUITY_SCORE: 20
ADLS_ACUITY_SCORE: 21

## 2019-11-26 ASSESSMENT — MIFFLIN-ST. JEOR: SCORE: 1623.34

## 2019-11-26 NOTE — CONSULTS
Care Transition Initial Assessment - RN        Met with: Patient and Caregiver.  DATA   Active Problems:    CHF exacerbation (H)       Cognitive Status: awake, alert and oriented.  Primary Care Clinic Name: [facility]  Primary Care MD Name: Michelle Gary  Contact information and PCP information verified: Yes  Lives With: facility resident(Senior male housing, [facility])      Quality of Family Relationships: helpful, involved  Description of Support System: Supportive, Involved   Who is your support system?: Facility resident(s)/Staff, Children   Support Assessment: Adequate family and caregiver support   Insurance concerns: No Insurance issues identified  ASSESSMENT  Patient currently receives the following services:  Pt lives at [facility] with 3 other residents.        Identified issues/concerns regarding health management: New CHF diagnosis  CTS following for CHF diagnosis and discharge planning. Met with pt at bedside to discuss plan of care. Pt was alert, oriented and pleasant in conversation. Pt verifies he lives at [facility] with 2 other residents. He is provided full cares and medications at his facility and uses a walker for ambulating. Spoke to Erika Daniels [phone], manager of [facility], regarding pt services and discharge plan. She verifies that they provide full services for pt: bathing, cooking, medication, transfer assistance. We discussed possible need for Low Na diet on discharge. Pt is assist of one with walker at his facility. He has had a recent fall in which she states he got up without assistance. Erika verifies that [facility] is a home with 3 current male residents. They will accept pt back at any time (and on Thanksgiving) and do not have any barriers to his return. They prefer to have any new medications filled here and sent with pt. Discharge orders can be faxed to fax #:[phone]. They will provide transportation. They would like for him  to have home care for therapies on his return. They prefer to use [email or web address] as their Home Care Agency. Pt is followed by [facility] at his home. They visit residence twice a week as needed.     PLAN  Patient/family is agreeable to the plan?  Yes  Patient anticipates discharging back home to [facility]        Patient anticipates needs for home equipment: No  Transportation/person available to transport on day of discharge is staff at [facility]. Erika Daniels [phone] should be called on day of discharge to arrange  time.    Plan/Disposition: Home   Appointments: Pt is followed in his home by [facility], Michelle Gary      Care  (CTS) will continue to follow as needed. Handoff will be sent to PCP on discharge.    Caren Andres RN BSN CM  Inpatient Care Coordination  [facility]  [phone]

## 2019-11-26 NOTE — PROGRESS NOTES
[facility]    Medicine Progress Note - Hospitalist Service       Date of Admission:  11/25/2019  Assessment & Plan        Lopez Mcdermott is a 89 year old male with history including moderate severe aortic stenosis, Macdonald's esophagus, subdural hematoma in 1/2019, BPH who requires intermittent straight cath, CKD stage II, chronic A. fib not on anticoagulant and or aspirin, HTN, iron deficiency anemia, AAA, and GI bleed who presents with abdominal distention and weight gain.  The patient fell a few days ago and was evaluated in the ED with a CT head and C-spine that were negative.  He notes increasing abdominal distention over the past 2 to 3 days and moderate discomfort with this although no specific pain per se.  He is also noted that he has had significantly increased lower extremity swelling right greater than left over this period of time and weight gain.  Patient was brought to the emergency room and admitted to the hospital for further work-up and evaluation treatment.  1.  Abdominal pain, small-volume ascites likely secondary to CHF:   -Ultrasound showed some more volume ascites.  -Patient has no abdominal tenderness, no fever, no leukocytosis, given the size no need to do paracentesis at this point.  -CT abdomen pelvis with contrast at outside facility report in the ED note states extensive ascites but ultrasound stated small size.  -CT also reported infrarenal abdominal aortic aneurysm measuring 51 x 50 mm at maximum dimension he has known history, we will follow-up with his providers as an outpatient.  -Patient albumin is 4, INR is 1.23, doubt cirrhosis, but needs outpatient follow-up  -Continue IV diuretics, hold off paracentesis at this point, patient is not symptomatic and there is no sign of infection.  -Jimenez catheter was placed initially in the ED for strict I/o along with urinary retention     2.  Possible new acute CHF, elevated troponin, severe aortic stenosis, right pleural  effusion, HTN, HLD:   -Patient has significant lower extremity edema, right leg more than left .  Right lower extremity ultrasound ruled out DVT.  -He has moderate right pleural effusion on chest x-ray, which is not new.  -BNP is elevated at 16,000.  Troponin is elevated at 0.055.  -No chest pain,  EKG showed A. fib without any ischemic changes, likely demand ischemia.  -He does have known moderate to severe aortic stenosis, with systolic murmur consistent with aortic stenosis.  -We will gently diurese him, this patient needs afterload due to aortic stenosis  -Continue IV Lasix 20 mg twice a day.  -Echo is pending, may involve cardiology when echo results available.  His symptoms could be due to worsening signs of aortic stenosis  -Continue metoprolol succinate 25 mg daily  -Started 81 mg aspirin  -Hold off thoracentesis for now, as the fluid is moderate.  No respiratory distress, may repeat chest x-ray to compare tomorrow after evaluating the patient     3. Chronic afib: not on anticoagulation secondary to previous GI bleed and intracranial bleeding.   -Continue metoprolol as ordered.     4.  Fall: Patient fell down few days ago and had extensive work-up for this at the time, including imaging with CT head, C-spine, chest x-ray without any obvious injury. Suspect related to his significant weight gain and edema.  -PT consult  -Fall precautions      5. YOLANDA on CKD stage II:  admission creatinine of 1.48 with baseline 1.2.  Suspect related to his acute CHF and will improve with diuretics.  -bmp tomorrow     6.  BPH: Continue PTA doxazosin 4 mg at bedtime.  Patient does intermittently straight cath at home.  -Remove Jimenez placed on admission, tomorrow(11/27/2019 a.m).   -Continue doxazosin and add Flomax daily     7.  AAA: Known AAA that has been followed regularly.  Again seen on his recent CT abdomen pelvis with IV contrast is a 5 x 5 cm AAA, slightly increased from previous 4 x 4 centimeter.  She will follow-up with  his vascular surgeon as an outpatient.     Diet: 2 Gram Sodium Diet    DVT Prophylaxis: Pneumatic Compression Devices  Jimenez Catheter: in place, indication: Strict 1-2 Hour I&O  Code Status: DNR      Disposition Plan   Expected discharge: 2 days more, if continues to improve, recommended to prior living arrangement once .  Entered: Asad Shields MD 11/26/2019, 12:06 PM     I discussed with patient, RN.  All patient's questions and concerns addressed    Asad Shields MD  Hospitalist Service  [facility]    ______________________________________________________________________    Interval History   Patient seen and examined, assumed care today, H&P reviewed, overall showed some improvement, abdominal distention, producing good urine, no shortness of breath, on IV diuretics.    Data reviewed today: I reviewed all medications, new labs and imaging results over the last 24 hours. I personally reviewed .    Physical Exam   Vital Signs: Temp: 95.9  F (35.5  C) Temp src: Oral BP: 115/67   Heart Rate: 62 Resp: 18 SpO2: 95 % O2 Device: None (Room air)    Weight: 211 lbs 0 oz  General Appearance: Alert and oriented, not in distress  Respiratory: Decreased breath sound basal more on the right lower lung field, scattered crackles no wheezing.  Cardiovascular: S1 and S2 well heard, systolic murmur grade 3/6 right sternal border radiating to the neck, irregularly irregular  GI: Soft, nontender, slightly distended, positive bowel sounds organomegaly.  Skin: No rash, exanthems or petechia, no jaundice.  Extremity: Right lower extremity relatively swollen when compared to the left.  +2-3 pitting edema right side +1-2 pitting edema left side    Data   Recent Labs   Lab 11/26/19  0611 11/25/19  2212 11/25/19  1726   WBC 6.7  --  7.7   HGB 11.4*  --  11.7*   *  --  103*   *  --  147*   INR  --   --  1.23*     --  142   POTASSIUM 3.7  --  4.0   CHLORIDE 106  --  108   CO2 28  --  29    BUN 23  --  22   CR 1.45*  --  1.48*   ANIONGAP 6  --  5   KENY 8.6  --  8.6   GLC 83  --  95   ALBUMIN  --   --  4.0   PROTTOTAL  --   --  7.6   BILITOTAL  --   --  1.1   ALKPHOS  --   --  92   ALT  --   --  21   AST  --   --  29   TROPI 0.053* 0.048* 0.055*     Recent Results (from the past 24 hour(s))   US Lower Extremity Venous Duplex Right    Narrative    EXAM: US LOWER EXTREMITY VENOUS DUPLEX RIGHT  LOCATION: [facility]  DATE/TIME: 11/25/2019 5:53 PM    INDICATION: Right leg swelling  COMPARISON: None.  TECHNIQUE: Venous Duplex ultrasound of the right lower extremity with and without compression, augmentation and duplex. Color flow and spectral Doppler with waveform analysis performed.    FINDINGS: Exam includes the common femoral, femoral, popliteal, and contralateral common femoral veins as well as segmentally visualized deep calf veins and greater saphenous vein.     RIGHT: No deep vein thrombosis. No superficial thrombophlebitis. No popliteal cyst. Nonspecific subcutaneous edema prominent within the calf.      Impression    IMPRESSION:  1.  No deep venous thrombosis in the right lower extremity.   Chest XR,  PA & LAT    Narrative    EXAM: XR CHEST 2 VW  LOCATION: [facility]  DATE/TIME: 11/25/2019 6:20 PM    INDICATION:  mild dyspnea  COMPARISON: None.      Impression    IMPRESSION: Moderate right and small left pleural effusions. No pneumothorax. Partial collapse of the right middle and lower lobes suggested. Pulmonary vasculature within normal limits. Left lung clear. Normal heart size. Normal mediastinal contour.   POC US ECHO LIMITED    Impression    [facility] Procedure Note      Limited Bedside ED Cardiac Ultrasound:    PROCEDURE: PERFORMED BY: Dr. Geo Benitez MD  INDICATIONS/SYMPTOM:  Shortness of Breath  PROBE: Cardiac phased array probe  BODY LOCATION: Chest  FINDINGS:   The ultrasound was performed utilizing the parasternal long axis, parasternal short  axis and apical 4 chamber views.  Cardiac contractility:  Present  Gross estimation of cardiac kinesis: depressed  Pericardial Effusion:  None  RV:LV ratio: LV > RV    INTERPRETATION:    Systolic function mildly depressed.  No pericardial effusion was found.    IMAGE DOCUMENTATION: Images were archived to PACs system.         US Abdomen Limited    Narrative    EXAM: US ABDOMEN LIMITED  LOCATION: [facility]  DATE/TIME: 11/25/2019 11:23 PM    INDICATION: Evaluate liver, site  COMPARISON: None.  TECHNIQUE: Limited abdominal ultrasound.    FINDINGS:    GALLBLADDER: Gallstones. No gallbladder wall thickening. Negative sonographic Barron sign.    BILE DUCTS: No biliary dilatation. The common duct measures 3.8 mm.    LIVER: Normal parenchyma with smooth contour. Enlarged measuring up to 20 cm. 2 cm cyst.    RIGHT KIDNEY: No hydronephrosis.    PANCREAS: The visualized portions are normal.    Small volume ascites ascites.      Impression    IMPRESSION:  1.  Ascites.  2.  Gallstones without other sonographic evidence of cholecystitis.  3.  Hepatomegaly with 2 cm hepatic cyst.     Medications       aspirin  81 mg Oral Daily     doxazosin  4 mg Oral At Bedtime     folic acid  1 mg Oral Daily     furosemide  20 mg Intravenous BID     metoprolol succinate ER  25 mg Oral Daily     pantoprazole  40 mg Oral Daily     sodium chloride (PF)  3 mL Intracatheter Q8H     tamsulosin  0.4 mg Oral Daily

## 2019-11-26 NOTE — PLAN OF CARE
Presentation/Diagnosis: CHF,  severe aortic stenosis, ascites, possible cirrosis  History: AFIB, AAA  Labs/Protocols: .048, Cr 1.48, BNP 16,758  Vitals: VSS  Telemetry: AFIB SVR, had a 2.1 second pause at 0520  Respiratory: RA, LS diminished  Neuro: He is A&O x 4 but is confused at times, could be just a night issue.  He has been pulling at his Jimenez and it has been bleeding on and off through the night.  He also pulled out his IV and heart monitor.  I put mittens on him to keep him from pulling at Jimenez and IV.  GI/: Jimenez, see chart for OP  Skin: L shoulder blanchable redness, carlota area redness, skin is warn on abdomen and has +2-+4 edema all over,   LDAs: Jimenez and PIV RH SL  Diet: 2g Na  Activity: assist x 1 or x2 with GB  Teaching:educated on POC  Plan: poss paracentesis

## 2019-11-26 NOTE — PLAN OF CARE
Heart Failure Care Pathway  GOALS TO BE MET BEFORE DISCHARGE:    1. Decrease congestion and/or edema with diuretic therapy to achieve near      optimal volume status.            Dyspnea improved:  Yes            Edema improved:     No, please explain: pt is still edemities, Right leg is more swollen and different colored than the left, MD is aware, US planned.         Net I/O and Weights since admission:          10/27 1500 - 11/26 1459  In: 1200 [P.O.:1200]  Out: 1575 [Urine:1575]  Net: -375            Vitals:    11/25/19 2129 11/26/19 0500   Weight: 96.6 kg (213 lb) 95.7 kg (211 lb)       2.  O2 sats > 92% on RA or at prior home O2 therapy level.          Current oxygenation status:       SpO2: 95 %         O2 Device: None (Room air),            Able to wean O2 this shift to keep sats > 92%:  Yes       Does patient use Home O2? No    3.  Tolerates ambulation and mobility near baseline: Yes        How many times did the patient ambulate with nursing staff this shift? 1  Pt is up with A1. On IV lasix, Tele Afib with occ PVCs. Pt was trying to pull garcia out last night, bleeding a little bit from the penis. Garcia can come out tomorrow after 1000 per MD. VS Q4. R PIV SL. 2g NA diet, strict I&O. Can be confused at times. PT is following the patient. Pt has a couple skin tears on arms,. Per area is blanchable, powder applied. Pt washed up in the chair this morning. Plan is to go back home. Will cont. With POC.     Please review the Heart Failure Care Pathway for additional HF goal outcomes.    Marylou Lambert, RN RN  11/26/2019

## 2019-11-26 NOTE — PLAN OF CARE
Heart Failure Care Pathway  GOALS TO BE MET BEFORE DISCHARGE:    1. Decrease congestion and/or edema with diuretic therapy to achieve near      optimal volume status.            Dyspnea improved:  Yes            Edema improved:     No, please explain: no changes        Net I/O and Weights since admission:          10/27 0700 - 11/26 0659  In: -   Out: 1000 [Urine:1000]  Net: -1000          There were no vitals filed for this visit.    2.  O2 sats > 92% on RA or at prior home O2 therapy level.          Current oxygenation status:       SpO2: 96 %         O2 Device: None (Room air),            Able to wean O2 this shift to keep sats > 92%:  not on Oxygen       Does patient use Home O2? No    3.  Tolerates ambulation and mobility near baseline: No, please explain: A-1        How many times did the patient ambulate with nursing staff this shift? 1    Please review the Heart Failure Care Pathway for additional HF goal outcomes.      A&Ox4, VSS, denies pain, up with A-1, BLE edema, RUE edema, Lasix IV 40mg  at ED, Bnp 16,758, NPO until after Abdominal US, TTE tomorrow, Troponin negative, Jimenez patent with good urinary output, will continue with POC.    SOFIA GONZALEZ, RN RN  11/25/2019

## 2019-11-26 NOTE — PHARMACY-ADMISSION MEDICATION HISTORY
Admission medication history interview status for this patient is complete. See [facility] admission navigator for allergy information, prior to admission medications and immunization status.     Medication history interview source(s):Patient  Medication history resources (including written lists, pill bottles, clinic record):care-everywhere    Changes made to PTA medication list:  Added: all  Deleted: none  Changed: none    Actions taken by pharmacist (provider contacted, etc):None     Additional medication history information:None    Medication reconciliation/reorder completed by provider prior to medication history? No    For patients on insulin therapy: no (Yes/No)   Lantus/levemir/NPH/Mix 70/30 dose: ___ in AM/PM or twice daily   Sliding scale Novolog Y/N   If Yes, do you have a baseline novolog pre-meal dose: ______units with meals   Patients eat three meals a day: Y/N ---  How many episodes of hypoglycemia (low blood glucose) do you have weekly: ---   How many missed doses do you have a week: ---  How many times do you check your blood glucose per day: ---  Any Barriers to therapy: cost of medications/comfortable with giving injections (if applicable)/ comfortable and confident with current diabetes regimen ---      Prior to Admission medications    Medication Sig Last Dose Taking? Auth Provider   acetaminophen (TYLENOL) 500 MG tablet Take 1,000 mg by mouth every 6 hours as needed for mild pain 11/25/2019 at am Yes Unknown, Entered By History   doxazosin (CARDURA) 4 MG tablet Take 4 mg by mouth At Bedtime 11/24/2019 at Unknown time Yes Unknown, Entered By History   folic acid (FOLVITE) 1 MG tablet Take 1 mg by mouth daily 11/25/2019 at Unknown time Yes Unknown, Entered By History   metoprolol succinate ER (TOPROL-XL) 25 MG 24 hr tablet Take 25 mg by mouth daily 11/25/2019 at Unknown time Yes Unknown, Entered By History   pantoprazole (PROTONIX) 40 MG EC tablet Take 40 mg by mouth daily 11/25/2019 at Unknown time  Yes Unknown, Entered By History   sodium chloride (NEBUSAL) 3 % neb solution Take 3 mLs by nebulization 2 times daily 11/25/2019 at am Yes Unknown, Entered By History

## 2019-11-26 NOTE — PLAN OF CARE
Discharge Planner PT   Patient plan for discharge: Home.   Current status: Orders received, chart reviewed. Pt from [facility] where he has Ax1 for mobility. Pt has been able to mobilize here in the hospital with Ax1 with RN staff. Per CC pt has had a few recent falls at home. Pt would be appropriate for HHPT on discharge to address baseline balance concerns. Pt agreeable to this plan. Pt appropriate to continue to mobilize with RN staff for duration of stay as he is at his baseline (Ax1).   Barriers to return to prior living situation: None with continued assist for mobility.  Recommendations for discharge: Home with HHPT  Rationale for recommendations: See above. Patient requires home PT at this time as attending PT in a clinic setting would be a considerable and significantly taxing effort; limiting the patient's ability to participate in therapy session.       Entered by: Willard Hammond 11/26/2019 11:50 AM

## 2019-11-26 NOTE — PLAN OF CARE
Heart Failure Care Pathway  GOALS TO BE MET BEFORE DISCHARGE:    1. Decrease congestion and/or edema with diuretic therapy to achieve near      optimal volume status.            Dyspnea improved:  Yes            Edema improved:     No, please explain: no changes        Net I/O and Weights since admission:          10/27 0700 - 11/26 0659  In: -   Out: 1000 [Urine:1000]  Net: -1000            Vitals:    11/25/19 2129   Weight: 96.6 kg (213 lb)       2.  O2 sats > 92% on RA or at prior home O2 therapy level.          Current oxygenation status:       SpO2: 95 %         O2 Device: None (Room air),            Able to wean O2 this shift to keep sats > 92%:  No, please explain: Pt on RA       Does patient use Home O2? No    3.  Tolerates ambulation and mobility near baseline: No, please explain: patient has unsteady gait        How many times did the patient ambulate with nursing staff this shift? 2    Please review the Heart Failure Care Pathway for additional HF goal outcomes.    Amy C. Severson, RN RN  11/26/2019

## 2019-11-26 NOTE — ED NOTES
[facility]  ED Nurse Handoff Report    Lopez Mcdermott is a 89 year old male   ED Chief complaint: Abdominal Pain  . ED Diagnosis:   Final diagnoses:   New onset of congestive heart failure (H)   Elevated troponin   Atrial fibrillation, unspecified type (H)   Other ascites   Pleural effusion     Allergies: No Known Allergies    Code Status: DNR / DNI  Activity level - Baseline/Home:  Assist x1 Activity Level - Current:   Assist X 2. Lift room needed: No. Bariatric: No   Needed: No   Isolation: No. Infection: Not Applicable.     Vital Signs:   Vitals:    11/25/19 1640   BP: (!) 149/99   Resp: 24   Temp: 97.5  F (36.4  C)   TempSrc: Oral   SpO2: 96%       Cardiac Rhythm:  ,      Pain level: 0-10 Pain Scale: 0  Patient confused: No. Patient Falls Risk: Yes.   Elimination Status: Unable to void and has not voided since arrival.    Patient Report - Initial Complaint: Abd pain. Weight gain. Focused Assessment: patient presents to ED due to increased weight gain, distention to RUQ pain. Recently seen in ED due to fall. Hx subdural.  musculoskeletal assessment: edema +1. Edema noticed on RUE .    Gastrointestinal assessment: distension noted to RUQ . Denies n/v/d, fever or abd pain.   Respiratory assessment: wet cough, diminished lung sounds.  Skin: skin tear noted on L forearm.     Patient self caths.   Jimenez placed   Tests Performed: BNP, CBC,CMP, US , EKG, Troponin, Chest xray,   . Abnormal Results:   Labs Ordered and Resulted from Time of ED Arrival Up to the Time of Departure from the ED   CBC WITH PLATELETS DIFFERENTIAL - Abnormal; Notable for the following components:       Result Value    RBC Count 3.58 (*)     Hemoglobin 11.7 (*)     Hematocrit 36.7 (*)      (*)     RDW 16.2 (*)     Platelet Count 147 (*)     All other components within normal limits   COMPREHENSIVE METABOLIC PANEL - Abnormal; Notable for the following components:    Creatinine 1.48 (*)     GFR Estimate 41 (*)     GFR  Estimate If Black 47 (*)     All other components within normal limits   INR - Abnormal; Notable for the following components:    INR 1.23 (*)     All other components within normal limits   NT PROBNP INPATIENT - Abnormal; Notable for the following components:    N-Terminal Pro BNP Inpatient 16,758 (*)     All other components within normal limits   TROPONIN I - Abnormal; Notable for the following components:    Troponin I ES 0.055 (*)     All other components within normal limits   PERIPHERAL IV CATHETER     POC US ECHO LIMITED   Final Result   [facility] Procedure Note        Limited Bedside ED Cardiac Ultrasound:      PROCEDURE: PERFORMED BY: Dr. Geo Benitez MD   INDICATIONS/SYMPTOM:  Shortness of Breath   PROBE: Cardiac phased array probe   BODY LOCATION: Chest   FINDINGS:    The ultrasound was performed utilizing the parasternal long axis, parasternal short axis and apical 4 chamber views.   Cardiac contractility:  Present   Gross estimation of cardiac kinesis: depressed   Pericardial Effusion:  None   RV:LV ratio: LV > RV      INTERPRETATION:    Systolic function mildly depressed.  No pericardial effusion was found.     IMAGE DOCUMENTATION: Images were archived to PACs system.              Chest XR,  PA & LAT   Final Result   IMPRESSION: Moderate right and small left pleural effusions. No pneumothorax. Partial collapse of the right middle and lower lobes suggested. Pulmonary vasculature within normal limits. Left lung clear. Normal heart size. Normal mediastinal contour.      US Lower Extremity Venous Duplex Right   Final Result   IMPRESSION:   1.  No deep venous thrombosis in the right lower extremity.        .   Treatments provided: ASA  Lasix   Family Comments: no family at bedside   OBS brochure/video discussed/provided to patient:  N/A  ED Medications:   Medications   aspirin (ASA) tablet 325 mg (has no administration in time range)   furosemide (LASIX) injection 40 mg (has no  administration in time range)     Drips infusing:  No  For the majority of the shift, the patient's behavior Green. Interventions performed were NA.     Severe Sepsis OR Septic Shock Diagnosis Present: No      ED Nurse Name/Phone Number: Angely Lafleur RN,   7:22 PM    RECEIVING UNIT ED HANDOFF REVIEW    Above ED Nurse Handoff Report was reviewed: Yes  Reviewed by: Kate Diggs RN on November 25, 2019 at 8:47 PM

## 2019-11-26 NOTE — H&P
[facility]  Hospitalist Admission Note  Name: Lopez Mcdermott    MRN: [id number]  YOB: 1929    Age: 89 year old  Date of admission: 11/25/2019  Primary care provider: Michelle Gary    Chief Complaint:  Abdominal pain    Assessment and Plan:   Abdominal pain, ascites, possible cirrhosis?:  Porting abdominal distention over the past few days with some pain although is nontender on exam.  Abdomen is mildly distended and likely is from ascites.  CT abdomen pelvis with contrast at outside facility report in the ED note states extensive ascites and soft tissue edema with possible cirrhotic appearance of the liver along with an infrarenal abdominal aortic aneurysm measuring 51 x 50 mm at maximum dimension.  Has a known AAA.  Highly doubt the AAA is playing a role in the abdominal distention and discomfort.  -obtain abdominal US to evaluate volume ascites and to see if there is any sign of cirrhosis although suspicion more for CHF as etiology for ascites  -May need paracentesis while here, starting with IV diuretics and evaluating for CHF  -Jimenez catheter was placed initially in the ED for strict I/oh along with urinary retention    Suspect new acute CHF, elevated troponin, severe aortic stenosis, right pleural effusion, HTN, HLD: Presenting with lower extremity edema, significant ascites, and a right pleural effusion on chest x-ray.  The effusion is been there before.  This degree of ascites is new and does not have a history of cirrhosis.  Significant increase in lower extremity edema well over the past 3 days.  1+ edema in both legs on exam.  Negative ultrasound for DVT on the right.  Per ED provider the point-of-care ultrasound looked to have a depressed ejection fraction concerning for new CHF and clinically has acute CHF on presentation.  BNP is elevated at 16,000.  Troponin is elevated at 0.055, although denies any chest pain or chest pressure sensation.  EKG shows A. fib without any  ischemic changes.  Suspect demand ischemia and this does not represent NSTEMI.  Did receive 324 mg aspirin.  He does have known moderate to severe aortic stenosis previously and I hear a loud 3/6 systolic murmur on exam that almost certainly is contributing.  -Received 40 mg IV Lasix in the ED, changed to 20 mg IV twice daily for now  -Obtain TTE tomorrow  -Telemetry  -Strict intake and output and daily weight  -Serial troponin, however anticipate will likely remain flat secondary to demand ischemia and based on risk factors of bleeding do not recommend heparin now especially as he is chest pain-free  -Continue metoprolol succinate 25 mg daily  -Started 81 mg aspirin  -Pending TTE  results likely to need cardiology involvement if worsening aortic stenosis playing a major role  -Start with diuretics although if respiratory status becomes an issue could consider thoracentesis for the right moderate pleural effusion    Chronic afib: not on anticoagulation secondary to previous GI bleed and intracranial bleeding.  Currently in A. fib and rate controlled on metoprolol succinate 25 mg daily.    Fall: Apparently fell a few days ago.  Has had imaging with CT head, C-spine, chest x-ray without any obvious injury.  Suspect related to his significant weight gain and edema.  -PT consult  -Fall precautions    Hx GI bleed: GI bleed in 4/2018.  Not on anticoagulation due to this.  Is on protonix 40mg daily that will be continued.  Monitor for any dark stools now and he is started on 81 mg aspirin daily.    Hx subdural hematoma: fall in 1/2019.  Since resolved.    Chronic anemia: Hg similar to baseline at 10-11.    YOLANDA on CKD stage II:  admission creatinine of 1.48 with baseline 1.2.  Suspect related to his acute CHF and will improve with diuretics.  -bmp tomorrow    BPH: Continue PTA doxazosin 4 mg at bedtime.  Intermittent straight cath at home.  Retaining urine unable to void.  Has been on Flomax in the past.  -Jimenez catheter  placed in the ED due to retention and for strict I/O, continue for next 24-48 hours then remove  -Continue doxazosin and add Flomax daily    AAA: Known AAA that has been followed regularly.  Again seen on his recent CT abdomen pelvis with IV contrast is a 5 x 5 cm AAA, slightly increased from previous 4 x 4 centimeter.    DVT Prophylaxis: Pneumatic Compression Devices  Code Status: DNR in the event of cardiac arrest, I reviewed this with him on admission and is consistent with previous status in care everywhere  FEN: 2 g sodium restriction  Discharge Dispo: TBD.  PT consult given fall  Estimated Disch Date / # of Days until Disch: Admit inpatient to cardiac telemetry unit for IV diuresis for presumed new CHF requiring further evaluation.  Anticipate minimal 2 night hospitalization.      History of Present Illness:  Lopez Mcdermott is a 89 year old male with PMH including moderate severe aortic stenosis, Macdonald's esophagus, subdural hematoma in 1/2019, BPH who requires intermittent straight cath, CKD stage II, chronic A. fib not on anticoagulant and or aspirin, HTN, iron deficiency anemia, AAA, and GI bleed who presents with abdominal distention and weight gain.  The patient fell a few days ago and was evaluated in the ED with a CT head and C-spine that were negative.  He notes increasing abdominal distention over the past 2 to 3 days and moderate discomfort with this although no specific pain per se.  He is also noted that he has had significantly increased lower extremity swelling right greater than left over this period of time and weight gain.  He denies any chest pain or chest pressure sensation.  He does have some shortness of breath that is worse with exertion.  Does not lay all the way flat due to shortness of breath.  Does not felt any palpitations.  Does not have any fevers or chills.  Once in a while has a cough although it is nonproductive.  Does not use a significant amount of alcohol and has not had no  previous liver disease.  Did have a recent CT abdomen pelvis that showed extensive ascites, possible cirrhotic appearing liver, and a AAA size of 5 x 5 cm so he was recommended to come in for evaluation.    History obtained from patient, medical record, and from Dr. Alexandre in the emergency department.  Blood pressure 149/98, pulse 88, temperature 97.5  F, oxygen 96% on room air.  Creatinine up from baseline 1.48, BNP significant elevated at 16,000 758, troponin slightly detectable at 0.055.  LFTs normal including albumin 4.0.  Hemoglobin 11.7, platelet count 147, white blood cell count 7%.  INR 1.23.  EKG shows A. fib without ST elevation or depression.  Chest x-ray shows a moderate right pleural effusion with atelectasis.  Per Dr. Alexandre a point-of-care ultrasound shows likely depressed EF.  Concern for new acute CHF to explain all of his edema.  He received 41 g IV Lasix and 324 mg aspirin.  Decision not to anticoagulate as this is not consistent with an STEMI with no chest pain history, ischemic change in EKG, and likely new CHF as the alternative etiology for the slight elevation in troponin.  Also risk of bleeding with previous GI bleed and subdural hematoma earlier this year.  Admit to inpatient cardiac telemetry.     Past Medical History reviewed:  Past Medical History:   Diagnosis Date     Atrial fibrillation (H)      BPH (benign prostatic hyperplasia)      CKD (chronic kidney disease), stage II      Essential hypertension      GI bleed      Iron deficiency anemia      Severe aortic stenosis      Subdural hematoma (H)      Past Surgical History reviewed:  EGD  Colonoscopy  Inguinal hernia repair  Cervical fusion  TURP  Hip pinning on the left    Social History reviewed:  Former smoker  No alcohol    Family History reviewed:  Father with history of CAD    Allergies:  No Known Allergies     Medications:  Toprol succinate 25 mill grams daily  Protonix 40 mg daily  Flomax 0.4 mg daily  Flonase  Cardura 400 at  bedtime    Review of Systems:  A Comprehensive greater than 10 system review of systems was carried out.  Pertinent positives and negatives are noted above.  Otherwise negative.     Physical Exam:  Blood pressure (!) 149/99, temperature 97.5  F (36.4  C), temperature source Oral, resp. rate 24, SpO2 96 %.  Wt Readings from Last 1 Encounters:   No data found for Wt     Exam:  Constitutional: Awake, NAD   Eyes: sclera white   HEENT: atraumatic, MMM  Respiratory: Decreased lung sounds at the right base, no focal crackles.  Slight expiratory wheeze  Cardiovascular: Irregularly, irregular rhythm, regular rate, 3/6 systolic murmur  GI: Mild to moderate distention, firm, nontender to palpation without guarding, bowel sounds present.   Genitourinary: Jimenez catheter draining yellow urine  Skin: no rash or lesions, acyanotic  Musculoskeletal/extremities:  1+ bilateral lower extremity pitting edema around the right  Neurologic: A&O, speech clear, strength and light touch sensation grossly normal  Psychiatric: calm, cooperative, normal affect    Lab and imaging data personally reviewed:  Labs:  Recent Labs   Lab 11/25/19  1726   WBC 7.7   HGB 11.7*   HCT 36.7*   *   *     Recent Labs   Lab 11/25/19  1726      POTASSIUM 4.0   CHLORIDE 108   CO2 29   ANIONGAP 5   GLC 95   BUN 22   CR 1.48*   GFRESTIMATED 41*   GFRESTBLACK 47*   KENY 8.6   PROTTOTAL 7.6   ALBUMIN 4.0   BILITOTAL 1.1   ALKPHOS 92   AST 29   ALT 21     Recent Labs   Lab 11/25/19  1726   NTBNPI 16,758*     Recent Labs   Lab 11/25/19  1726   INR 1.23*     Recent Labs   Lab 11/25/19  1726   TROPI 0.055*       EKG:  afib    Imaging:  Recent Results (from the past 24 hour(s))   US Lower Extremity Venous Duplex Right    Narrative    EXAM: US LOWER EXTREMITY VENOUS DUPLEX RIGHT  LOCATION: [facility]  DATE/TIME: 11/25/2019 5:53 PM    INDICATION: Right leg swelling  COMPARISON: None.  TECHNIQUE: Venous Duplex ultrasound of the right lower  extremity with and without compression, augmentation and duplex. Color flow and spectral Doppler with waveform analysis performed.    FINDINGS: Exam includes the common femoral, femoral, popliteal, and contralateral common femoral veins as well as segmentally visualized deep calf veins and greater saphenous vein.     RIGHT: No deep vein thrombosis. No superficial thrombophlebitis. No popliteal cyst. Nonspecific subcutaneous edema prominent within the calf.      Impression    IMPRESSION:  1.  No deep venous thrombosis in the right lower extremity.   Chest XR,  PA & LAT    Narrative    EXAM: XR CHEST 2 VW  LOCATION: [facility] [place]  DATE/TIME: 11/25/2019 6:20 PM    INDICATION:  mild dyspnea  COMPARISON: None.      Impression    IMPRESSION: Moderate right and small left pleural effusions. No pneumothorax. Partial collapse of the right middle and lower lobes suggested. Pulmonary vasculature within normal limits. Left lung clear. Normal heart size. Normal mediastinal contour.   POC US ECHO LIMITED    Impression    [facility] Procedure Note      Limited Bedside ED Cardiac Ultrasound:    PROCEDURE: PERFORMED BY: Dr. Geo Benitez MD  INDICATIONS/SYMPTOM:  Shortness of Breath  PROBE: Cardiac phased array probe  BODY LOCATION: Chest  FINDINGS:   The ultrasound was performed utilizing the parasternal long axis, parasternal short axis and apical 4 chamber views.  Cardiac contractility:  Present  Gross estimation of cardiac kinesis: depressed  Pericardial Effusion:  None  RV:LV ratio: LV > RV    INTERPRETATION:    Systolic function mildly depressed.  No pericardial effusion was found.    IMAGE DOCUMENTATION: Images were archived to PACs system.               Felice Green MD  Hospitalist  [facility]

## 2019-11-27 ENCOUNTER — APPOINTMENT (OUTPATIENT)
Dept: GENERAL RADIOLOGY | Facility: CLINIC | Age: 84
DRG: 292 | End: 2019-11-27
Attending: INTERNAL MEDICINE
Payer: MEDICARE

## 2019-11-27 LAB
ANION GAP SERPL CALCULATED.3IONS-SCNC: 8 MMOL/L (ref 3–14)
BUN SERPL-MCNC: 25 MG/DL (ref 7–30)
CALCIUM SERPL-MCNC: 8.4 MG/DL (ref 8.5–10.1)
CHLORIDE SERPL-SCNC: 104 MMOL/L (ref 94–109)
CO2 SERPL-SCNC: 28 MMOL/L (ref 20–32)
CREAT SERPL-MCNC: 1.42 MG/DL (ref 0.66–1.25)
GFR SERPL CREATININE-BSD FRML MDRD: 43 ML/MIN/{1.73_M2}
GLUCOSE SERPL-MCNC: 89 MG/DL (ref 70–99)
POTASSIUM SERPL-SCNC: 3.6 MMOL/L (ref 3.4–5.3)
SODIUM SERPL-SCNC: 140 MMOL/L (ref 133–144)

## 2019-11-27 PROCEDURE — 25000132 ZZH RX MED GY IP 250 OP 250 PS 637: Mod: GY | Performed by: INTERNAL MEDICINE

## 2019-11-27 PROCEDURE — 25000128 H RX IP 250 OP 636: Performed by: INTERNAL MEDICINE

## 2019-11-27 PROCEDURE — 71046 X-RAY EXAM CHEST 2 VIEWS: CPT

## 2019-11-27 PROCEDURE — 99222 1ST HOSP IP/OBS MODERATE 55: CPT | Performed by: INTERNAL MEDICINE

## 2019-11-27 PROCEDURE — 12000000 ZZH R&B MED SURG/OB

## 2019-11-27 PROCEDURE — 36415 COLL VENOUS BLD VENIPUNCTURE: CPT | Performed by: INTERNAL MEDICINE

## 2019-11-27 PROCEDURE — 80048 BASIC METABOLIC PNL TOTAL CA: CPT | Performed by: INTERNAL MEDICINE

## 2019-11-27 PROCEDURE — 99232 SBSQ HOSP IP/OBS MODERATE 35: CPT | Performed by: INTERNAL MEDICINE

## 2019-11-27 RX ORDER — POTASSIUM CHLORIDE 1.5 G/1.58G
20 POWDER, FOR SOLUTION ORAL ONCE
Status: COMPLETED | OUTPATIENT
Start: 2019-11-27 | End: 2019-11-27

## 2019-11-27 RX ADMIN — ASPIRIN 81 MG: 81 TABLET, COATED ORAL at 09:17

## 2019-11-27 RX ADMIN — METOPROLOL SUCCINATE 25 MG: 25 TABLET, EXTENDED RELEASE ORAL at 09:17

## 2019-11-27 RX ADMIN — FUROSEMIDE 20 MG: 10 INJECTION, SOLUTION INTRAMUSCULAR; INTRAVENOUS at 09:17

## 2019-11-27 RX ADMIN — FOLIC ACID 1 MG: 1 TABLET ORAL at 09:17

## 2019-11-27 RX ADMIN — TAMSULOSIN HYDROCHLORIDE 0.4 MG: 0.4 CAPSULE ORAL at 09:17

## 2019-11-27 RX ADMIN — PANTOPRAZOLE SODIUM 40 MG: 40 TABLET, DELAYED RELEASE ORAL at 09:17

## 2019-11-27 RX ADMIN — DOXAZOSIN 4 MG: 4 TABLET ORAL at 21:39

## 2019-11-27 RX ADMIN — FUROSEMIDE 20 MG: 10 INJECTION, SOLUTION INTRAMUSCULAR; INTRAVENOUS at 15:58

## 2019-11-27 RX ADMIN — POTASSIUM CHLORIDE 20 MEQ: 1.5 POWDER, FOR SOLUTION ORAL at 14:02

## 2019-11-27 ASSESSMENT — MIFFLIN-ST. JEOR: SCORE: 1609.73

## 2019-11-27 ASSESSMENT — ACTIVITIES OF DAILY LIVING (ADL)
ADLS_ACUITY_SCORE: 20

## 2019-11-27 NOTE — PLAN OF CARE
Alert. Disoriented to place/situation at times. Denies pain/denies shortness of breath. Voiding OK after garcia removal. Good appetite. Repeat chest x-ray done today. +2 edema to BLE. Up with A x 1, gait belt and walker. Will continue with POC.

## 2019-11-27 NOTE — CONSULTS
[facility]    Cardiology Consultation     Date of Admission:  11/25/2019    Primary Care Physician   Michelle Gary     Consult Date:  11/27/2019      REASON FOR CONSULTATION:  Severe aortic stenosis, congestive heart failure.      REFERRING PHYSICIAN:  Dr. Arroyo from the Hospitalist Service.      IMPRESSION:   1.  Acute exacerbation of diastolic heart failure.   2.  Severe aortic stenosis.   3.  Dementia.   4.  Abdominal aortic aneurysm, 5.1 x 5 mm with possible rapid progression.   5.  History of traumatic subdural hematoma.   6.  History of gastrointestinal bleeding.   7.  Permanent atrial fibrillation, not on oral anticoagulation due to the above 2 reasons.   8.  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   9.  Benign prostatic hypertrophy with self-catheterization.      On speaking with this gentleman, it is quite apparent that he has significant loss of short-term memory.  He does not know why he is in hospital aside from the fact that he thinks he needs a checkup.  He denies shortness of breath, though he does have clinical evidence of congestive heart failure.  When I asked him whether he wanted to have anything done about his heart, he tells me that at the age of 89 he does not want any further aggressive measures.      As such, there is not much for me to offer at this time aside from advising continued diuresis.  We will continue to follow his in-hospital course with you as necessary.  After discharge, he can continue his cardiac care with his usual cardiologist at [facility].      Thank you for involving us in the care of this delightful gentleman.      HISTORY OF PRESENT ILLNESS:  This is a gentleman who is alert, pleasant, but has very poor short-term memory.  He confabulates to try and hide this fact and this is corroborated by staff at home that he resides in as well as our excellent nursing staff here.  He does not know why he is admitted to the hospital.  He does not know what  month we are in, but he does know that we are in 2019.  He is disoriented to place.  He denies chest pains, dizzy spells.  He thinks he may be a little short of breath, but cannot tell me duration of this symptom.  He thinks it has been there for many years.  He does recall working as a  for 40 years.  He denies weight gain, abdominal pain.  He does have multiple medical illnesses as listed above.        Past Medical History   I have reviewed this patient's medical history and updated it with pertinent information if needed.   Past Medical History:   Diagnosis Date     Atrial fibrillation (H)      BPH (benign prostatic hyperplasia)      CKD (chronic kidney disease), stage II      Essential hypertension      GI bleed      Iron deficiency anemia      Severe aortic stenosis      Subdural hematoma (H)        Past Surgical History   I have reviewed this patient's surgical history and updated it with pertinent information if needed.  No past surgical history on file.    Prior to Admission Medications   Prior to Admission Medications   Prescriptions Last Dose Informant Patient Reported? Taking?   acetaminophen (TYLENOL) 500 MG tablet 11/25/2019 at am  Yes Yes   Sig: Take 1,000 mg by mouth every 6 hours as needed for mild pain   doxazosin (CARDURA) 4 MG tablet 11/24/2019 at Unknown time  Yes Yes   Sig: Take 4 mg by mouth At Bedtime   folic acid (FOLVITE) 1 MG tablet 11/25/2019 at Unknown time  Yes Yes   Sig: Take 1 mg by mouth daily   metoprolol succinate ER (TOPROL-XL) 25 MG 24 hr tablet 11/25/2019 at Unknown time  Yes Yes   Sig: Take 25 mg by mouth daily   pantoprazole (PROTONIX) 40 MG EC tablet 11/25/2019 at Unknown time  Yes Yes   Sig: Take 40 mg by mouth daily   sodium chloride (NEBUSAL) 3 % neb solution 11/25/2019 at am  Yes Yes   Sig: Take 3 mLs by nebulization 2 times daily      Facility-Administered Medications: None     Allergies   No Known Allergies    Social History   I have reviewed this patient's  social history and updated it with pertinent information if needed. Lopez Mcdermott      Family History   I have reviewed this patient's family history and updated it with pertinent information if needed.   No family history on file.    Review of Systems   The 10 point Review of Systems is negative other than noted in the HPI or here.     Physical Exam   Temp: 96.8  F (36  C) Temp src: Oral BP: 123/74   Heart Rate: 67 Resp: 20 SpO2: 94 % O2 Device: None (Room air)    Vital Signs with Ranges  Temp:  [96.1  F (35.6  C)-96.8  F (36  C)] 96.8  F (36  C)  Heart Rate:  [54-79] 67  Resp:  [18-20] 20  BP: (110-141)/(52-77) 123/74  SpO2:  [94 %-96 %] 94 %  208 lbs 0 oz    GENERAL:  A very pleasant gentleman in no acute distress.   VITAL SIGNS:  Blood pressure 123/74.  He is afebrile.  Heart rate is in the 60s.  Permanent atrial fibrillation.   HEENT:  Examination is unremarkable.  Mucous membranes appear a little pale.  He has no clubbing, no peripheral central cyanosis.   NECK:  Examination reveals a distended external jugular vein even with him sitting up.  No thyromegaly.   CARDIAC:  Cardiac apex is not palpable.  Auscultation reveals a 2-3/6 ejection systolic murmur with diminished second heart sound.   CHEST:  Symmetrical expansion without the use of accessory muscles.  Breath sounds are diminished at the right base.  No added wheezes or crackles.   ABDOMEN:  Soft and nontender.  No hepatosplenomegaly.  The abdominal aorta could not be palpated.   EXTREMITIES:  He has trace bilateral ankle edema with diminished foot pulses.  A soft left carotid bruit is audible.   CENTRAL NERVOUS SYSTEM:  Spontaneous movement of all 4 limbs.   PSYCHIATRIC:  Mood appears quite cheerful.      LABORATORY DATA:  EKG shows atrial fibrillation with PVCs, delayed R-wave progression.  Creatinine is 1.48.  Potassium 4.0, sodium 142.  GFR estimate of 41.  NT proBNP 16,758.  Troponins 0.055, 0.048, 0.053.  No significant rise and fall.  White cell  count 6.7, hemoglobin 11.4, platelet count of 128.  Chest x-ray showing bilateral pleural effusions.        Data   Results for orders placed or performed during the hospital encounter of 19 (from the past 24 hour(s))   Basic metabolic panel   Result Value Ref Range    Sodium 140 133 - 144 mmol/L    Potassium 3.6 3.4 - 5.3 mmol/L    Chloride 104 94 - 109 mmol/L    Carbon Dioxide 28 20 - 32 mmol/L    Anion Gap 8 3 - 14 mmol/L    Glucose 89 70 - 99 mg/dL    Urea Nitrogen 25 7 - 30 mg/dL    Creatinine 1.42 (H) 0.66 - 1.25 mg/dL    GFR Estimate 43 (L) >60 mL/min/[1.73_m2]    GFR Estimate If Black 50 (L) >60 mL/min/[1.73_m2]    Calcium 8.4 (L) 8.5 - 10.1 mg/dL   XR Chest 2 Views    Narrative    CHEST TWO VIEWS  2019 11:06 AM     HISTORY: Follow-up pleural effusion    COMPARISON: 2019 chest x-ray      Impression    IMPRESSION: Slight increase in right pleural effusion and associated  atelectasis or infiltrate. Small left pleural effusion not  significantly changed. Stable cardiac silhouette. Left lung clear.  Pulmonary vessels within normal limits.    MD AMARI MIRANDA MD, FACC             D: 2019   T: 2019   MT:       Name:     GALILEO GROVE   MRN:      [id number]        Account:       [id number]   :      1929           Consult Date:  2019      Document: [id number]

## 2019-11-27 NOTE — PLAN OF CARE
PT: New PT orders received. Please see PT deferral note from 11/26. Pt is at baseline for mobility: Ax1. Josiah BOWEN. Spoke with RN. Will complete order.

## 2019-11-27 NOTE — PLAN OF CARE
"OT-Orders received, spoke with PT regarding patient as PT had placed call to patient's residence. Per PT, patient is at baseline for mobility- currently and Ax1, which is what the care team at patient's residence reports he is. Per care coordinator note dated 11/16 \" Spoke to Erika Daniels [phone], manager of [facility], regarding pt services and discharge plan. She verifies that they provide full services for pt: bathing, cooking, medication, transfer assistance. We discussed possible need for Low Na diet on discharge. Pt is assist of one with walker at his facility. He has had a recent fall in which she states he got up without assistance. Erika verifies that [facility] is a home with 3 current male residents. They will accept pt back at any time (and on Thanksgiving) and do not have any barriers to his return.\" As patient has high level of A and resident care team is able to manage patient's needs, will discharge from OT services.   "

## 2019-11-27 NOTE — PROGRESS NOTES
[facility]    Medicine Progress Note - Hospitalist Service       Date of Admission:  11/25/2019  Assessment & Plan        Lopez Mcdermott is a 89 year old male with history including moderate severe aortic stenosis, Macdonald's esophagus, subdural hematoma in 1/2019, BPH who requires intermittent straight cath, CKD stage II, chronic A. fib not on anticoagulant and or aspirin, HTN, iron deficiency anemia, AAA, and GI bleed who presents with abdominal distention and weight gain.  The patient fell a few days ago and was evaluated in the ED with a CT head and C-spine that were negative.  He notes increasing abdominal distention over the past 2 to 3 days and moderate discomfort with this although no specific pain per se.  He is also noted that he has had significantly increased lower extremity swelling right greater than left over this period of time and weight gain.  Patient was brought to the emergency room and admitted to the hospital for further work-up and evaluation treatment.  1.  Abdominal pain, small-volume ascites likely secondary to CHF.  -Ultrasound showed small volume ascites.  -Patient has no abdominal tenderness, no fever, no leukocytosis, given the size no need to do paracentesis at this point.  -CT abdomen pelvis with contrast at outside facility report in the ED note states extensive ascites but ultrasound stated small size.  -CT also reported infrarenal abdominal aortic aneurysm measuring 51 x 50 mm at maximum dimension he has known history, we will follow-up with his providers as an outpatient.  -Patient albumin is 4, INR is 1.23, doubt cirrhosis, but needs outpatient follow-up  -Continue IV diuretics, hold off paracentesis at this point, patient is not symptomatic and there is no sign of infection.  -Jimenez catheter was placed initially in the ED for strict I/o along with urinary retention     2.  Acute systolic CHF, elevated troponin, severe aortic stenosis, right pleural effusion, HTN,  HLD:   -Patient has significant lower extremity edema, right leg more than left .  Right lower extremity ultrasound ruled out DVT.  -He has moderate right pleural effusion on chest x-ray, which is not new.  -BNP is elevated at 16,000.  Troponin is elevated at 0.055.  -No chest pain,  EKG showed A. fib without any ischemic changes, likely demand ischemia.  -He does have known moderate to severe aortic stenosis, with systolic murmur consistent with aortic stenosis.  -We will gently diurese him, this patient needs afterload due to aortic stenosis  -Continue IV Lasix 20 mg twice a day.  -Echo showed EF of 45 to 50%, severe aortic stenosis, global hypokinesia of the left ventricle, dilated right ventricle with reduced right ventricular function.  -Cardiology consulted, input appreciated, discussed with Dr. CORONADO, follow-up cardiology recommendations regarding congestive heart failure and stenosis.  -His symptoms could be due to worsening signs of aortic stenosis  -Continue metoprolol succinate 25 mg daily continue aspirin 81 mg aspirin  -Hold off thoracentesis for now, as the fluid seize is moderate.  No respiratory distress.  -Chest x-ray showed right pleural effusion with associated atelectasis which seems slightly increased from prior x-ray.  -Continue to diurese the patient.   -If she is symptomatic, hypoxic, we will consider thoracentesis.     3. Chronic Afib: Not on anticoagulation secondary to previous GI bleed and intracranial bleeding.   -Continue metoprolol as ordered.     4.  Fall: Patient fell down few days ago and had extensive work-up for this at the time, including imaging with CT head, C-spine, chest x-ray without any obvious injury. Suspect related to his significant weight gain and edema.  -PT consult  -Fall precautions      5. YOLANDA on CKD stage II:  admission creatinine of 1.48 with baseline 1.2.  Suspect related to his acute CHF and will improve with diuretics.  -bmp tomorrow     6.  BPH: Continue PTA  doxazosin 4 mg at bedtime.  Patient does intermittently straight cath at home.  -Remove Jimenez placed on admission, tomorrow(11/27/2019 a.m).   -Continue doxazosin and add Flomax daily     7.  AAA: Known AAA that has been followed regularly.  Again seen on his recent CT abdomen pelvis with IV contrast is a 5 x 5 cm AAA, slightly increased from previous 4 x 4 centimeter.  She will follow-up with his vascular surgeon as an outpatient.     Diet: 2 Gram Sodium Diet    DVT Prophylaxis: Pneumatic Compression Devices  Jimenez Catheter: not present  Code Status: DNR      Disposition Plan   Expected discharge: Expect at least 2 days more, if continues to improve, recommended to prior living arrangement once .  Entered: Asad Shields MD 11/27/2019, 1:05 PM     I discussed with patient, RN.  All patient's questions and concerns addressed    Asad Shields MD  Hospitalist Service  [facility]    ______________________________________________________________________    Interval History   Patient seen and examined, no new overnight issues, no shortness of breath, no cough or runny nose, no chest pain or palpitation or dizziness. Producing good urine, no fever or chills, on IV diuretics.    Data reviewed today: I reviewed all medications, new labs and imaging results over the last 24 hours. I personally reviewed .    Physical Exam   Vital Signs: Temp: 96.8  F (36  C) Temp src: Oral BP: 123/74   Heart Rate: 67 Resp: 20 SpO2: 94 % O2 Device: None (Room air)    Weight: 208 lbs 0 oz  General Appearance: Alert and oriented, not in distress  Respiratory: Decreased breath sound posterior lower half more on the right lung field, scattered crackles,no wheezing.  CVS: S1 and S2 well heard, systolic murmur grade 3/6 right sternal border radiating to the neck, irregularly irregular  GI: Soft, nontender, slightly distended, positive bowel sounds organomegaly.  Skin: No rash, exanthems or petechia, no  jaundice.  Extremity: Right lower extremity relatively swollen when compared to the left.  +2 pitting edema right side +1-2 pitting edema left side.  The right lower extremity swelling improved today    Data   Recent Labs   Lab 19  0643 19  0611 19  2212 19  1726   WBC  --  6.7  --  7.7   HGB  --  11.4*  --  11.7*   MCV  --  107*  --  103*   PLT  --  128*  --  147*   INR  --   --   --  1.23*    140  --  142   POTASSIUM 3.6 3.7  --  4.0   CHLORIDE 104 106  --  108   CO2 28 28  --  29   BUN 25 23  --  22   CR 1.42* 1.45*  --  1.48*   ANIONGAP 8 6  --  5   KENY 8.4* 8.6  --  8.6   GLC 89 83  --  95   ALBUMIN  --   --   --  4.0   PROTTOTAL  --   --   --  7.6   BILITOTAL  --   --   --  1.1   ALKPHOS  --   --   --  92   ALT  --   --   --  21   AST  --   --   --  29   TROPI  --  0.053* 0.048* 0.055*     Recent Results (from the past 24 hour(s))   Echocardiogram Complete    Narrative    [id number]  [id number]  [id number]  [id number]^DREW^COCO^[facility]  Echocardiography Laboratory  [address] [place] [address]        Name: GALILEO GROVE  MRN: [id number]  : 1929  Study Date: 2019 01:17 PM  Age: 89 yrs  Gender: Male  Patient Location: [facility]  Reason For Study: Edema  Ordering Physician: COCO RUSSELL  Referring Physician: Michelle Gary PA-C  Performed By: Phyllis Marquez RDCS     BSA: 2.1 m2  Height: 70 in  Weight: 211 lb  HR: 78  BP: 149/99 mmHg  _____________________________________________________________________________  __        Procedure  Complete Portable Echo Adult.  _____________________________________________________________________________  __        Interpretation Summary     The visual ejection fraction is estimated at 45-50%.  There is mild global hypokinesia of the left ventricle.  There is mild to moderate concentric left ventricular hypertrophy.  The right ventricle is moderately dilated.  The right ventricular  systolic function is mild to moderately reduced.  Severe valvular aortic stenosis.  The mean AoV pressure gradient is 38mmHg.  DVI 0.19  A bicuspid aortic valve cannot be excluded.  The right atrium is moderate to severely dilated.  Severe (>55mmHg) pulmonary hypertension is present.  IVC diameter >2.1 cm collapsing <50% with sniff suggests a high RA pressure  estimated at 15 mmHg or greater.  Dilation of the inferior vena cava (3.6 cm) is present with abnormal  respiratory variation in diameter.  There is no comparison study available.  _____________________________________________________________________________  __        Left Ventricle  The left ventricle is normal in size. There is mild to moderate concentric  left ventricular hypertrophy. The visual ejection fraction is estimated at 45-  50%. Diastolic function not assessed due to atrial fibrillation. There is mild  global hypokinesia of the left ventricle.     Right Ventricle  The right ventricle is moderately dilated. The right ventricular systolic  function is mild to moderately reduced.     Atria  The left atrium is mildly dilated. The right atrium is moderate to severely  dilated. There is no color Doppler evidence of an atrial shunt.     Mitral Valve  The mitral valve leaflets are moderately thickened. There is moderate mitral  annular calcification. There is mild (1+) mitral regurgitation.        Tricuspid Valve  Tricuspid leaflets are thickened. Tricuspid valve fails to coapt. There is  mod-severe to severe (3-4+) tricuspid regurgitation. The right ventricular  systolic pressure is approximated at 45.2 mmHg plus the right atrial pressure.  IVC diameter >2.1 cm collapsing <50% with sniff suggests a high RA pressure  estimated at 15 mmHg or greater. Severe (>55mmHg) pulmonary hypertension is  present.     Aortic Valve  A bicuspid aortic valve cannot be excluded. No aortic regurgitation is  present. Severe valvular aortic stenosis. The mean AoV pressure  gradient is  38mmHg.     Pulmonic Valve  The pulmonic valve is not well visualized. There is trace pulmonic valvular  regurgitation. Normal pulmonic valve velocity.     Vessels  Borderline aortic root dilatation. Dilation of the inferior vena cava is  present with abnormal respiratory variation in diameter. IVC diameter >2.1 cm  collapsing <50% with sniff suggests a high RA pressure estimated at 15 mmHg or  greater.     Pericardium  Trivial pericardial effusion.        Rhythm  The rhythm was atrial fibrillation with controlled ventricular rate at rest.  _____________________________________________________________________________  __  MMode/2D Measurements & Calculations  IVSd: 1.3 cm     LVIDd: 4.2 cm  LVIDs: 2.8 cm  LVPWd: 1.5 cm  FS: 33.4 %  LV mass(C)d: 226.3 grams  LV mass(C)dI: 106.0 grams/m2  Ao root diam: 3.7 cm  LA dimension: 4.6 cm  LA/Ao: 1.2  LVOT diam: 2.4 cm  LVOT area: 4.7 cm2  LA Volume (BP): 76.6 ml  LA Volume Index (BP): 35.8 ml/m2  RWT: 0.70           Doppler Measurements & Calculations  MV E max ethan: 123.2 cm/sec  MV max P.5 mmHg  MV mean P.4 mmHg  MV V2 VTI: 37.8 cm  MVA(VTI): 2.2 cm2  MV P1/2t max ethan: 146.8 cm/sec  MV P1/2t: 70.5 msec  MVA(P1/2t): 3.1 cm2  MV dec slope: 610.1 cm/sec2  MV dec time: 0.19 sec  Ao V2 max: 396.4 cm/sec  Ao max P.0 mmHg  Ao V2 mean: 290.0 cm/sec  Ao mean P.6 mmHg  Ao V2 VTI: 92.2 cm  PALMER(I,D): 0.91 cm2  PALMER(V,D): 0.96 cm2  LV V1 max P.7 mmHg  LV V1 max: 81.5 cm/sec  LV V1 VTI: 18.0 cm  SV(LVOT): 83.7 ml  SI(LVOT): 39.2 ml/m2  TR max ethan: 335.8 cm/sec  TR max P.2 mmHg  AV Ethan Ratio (DI): 0.21  PALMER Index (cm2/m2): 0.43  E/E' av.1  Lateral E/e': 17.7  Medial E/e': 20.4              _____________________________________________________________________________  __        Report approved by: Dm Sommer 2019 05:27 PM      XR Chest 2 Views    Narrative    CHEST TWO VIEWS  2019 11:06 AM     HISTORY: Follow-up pleural  effusion    COMPARISON: 11/25/2019 chest x-ray      Impression    IMPRESSION: Slight increase in right pleural effusion and associated  atelectasis or infiltrate. Small left pleural effusion not  significantly changed. Stable cardiac silhouette. Left lung clear.  Pulmonary vessels within normal limits.    LUIS DENSON MD     Medications       aspirin  81 mg Oral Daily     doxazosin  4 mg Oral At Bedtime     folic acid  1 mg Oral Daily     furosemide  20 mg Intravenous BID     metoprolol succinate ER  25 mg Oral Daily     pantoprazole  40 mg Oral Daily     sodium chloride (PF)  3 mL Intracatheter Q8H     tamsulosin  0.4 mg Oral Daily

## 2019-11-27 NOTE — PLAN OF CARE
Heart Failure Care Pathway  GOALS TO BE MET BEFORE DISCHARGE:    1. Decrease congestion and/or edema with diuretic therapy to achieve near      optimal volume status.            Dyspnea improved:  yes            Edema improved:     Yes        Net I/O and Weights since admission:          10/28 0700 - 11/27 0659  In: 1200 [P.O.:1200]  Out: 3675 [Urine:3675]  Net: -2475            Vitals:    11/25/19 2129 11/26/19 0500   Weight: 96.6 kg (213 lb) 95.7 kg (211 lb)       2.  O2 sats > 92% on RA or at prior home O2 therapy level.          Current oxygenation status:       SpO2: 96 %  RA       ,            Able to wean O2 this shift to keep sats > 92%:  N/A       Does patient use Home O2? {NO   still A of 2        How many times did the patient ambulate with nursing staff this shift? x1    Please review the Heart Failure Care Pathway for additional HF goal outcomes.    Kate Diggs RN RN  11/26/2019

## 2019-11-27 NOTE — CONSULTS
CLINICAL NUTRITION SERVICES  -  ASSESSMENT NOTE      MALNUTRITION:  % Weight Loss:  Weight loss does not meet criteria for malnutrition --> suspected fluid shifts  % Intake:  No decreased intake noted --> patient denied during admit, no indication of decreased PO intakes during limited admit  Subcutaneous Fat Loss:  Upper arm region moderate to severe depletion --> not using as indicator given only in 1 region  Muscle Loss:  Temporal region moderate depletion, Clavicle bone region moderate to severe depletion, Acromion bone region moderate to severe depletion and Dorsal hand region moderate depletion --> LEs masked by fluid  Fluid Retention:  Mild to moderate --> weak 2nd indictor and not clearly masking true wt trends    Malnutrition Diagnosis: Patient does not meet two of the above criteria necessary for diagnosing malnutrition        REASON FOR ASSESSMENT  Lopez Mcdermott is a 89 year old male seen by Registered Dietitian for Admission Nutrition Risk Screen for unintentional loss of 10# or more in the past two months.      NUTRITION HISTORY  - Information obtained from chart and patient.  Per discussion with RN, patient not a good historian (forgetful, confused at times).    - Patient with a h/o CKD, Macdonald's esophagus, GI bleed, aortic stenosis.  Admit 2/2 abdominal distention and edema, found to have ?new CHF and possible cirrhosis (thought to be CHF and requiring outpatient follow-up).    - Patient reports regular diet at home.   - Meals provided TID by living facility per his report.  - He denies a decrease in PO intakes PTA.   - Denies use of high protein oral supplements.  - NKFA.       CURRENT NUTRITION ORDERS  Diet Order:     2 gram Na    Current Intake/Tolerance:  Consistently consuming % of meals since admit.  Confirmed by current RN.      NUTRITION FOCUSED PHYSICAL ASSESSMENT FOR DIAGNOSING MALNUTRITION)  Yes           Observed:    Muscle wasting (refer to documentation in Malnutrition  "section)    Obtained from Chart/Interdisciplinary Team:  Mild to moderate edema    ANTHROPOMETRICS  Height: 5' 10\"  Weight: 208#  Body mass index is 29.84 kg/m .  Weight Status:  Overweight BMI 25-29.9  Weight History:  Wt Readings from Last 10 Encounters:   11/27/19 94.3 kg (208 lb)   - 188# documented in 5/21/2019 per care everywhere.    - 198# documented in 9/18/2019 per care everywhere.  - Hard to determine dry wt.  Patient himself reports as 160# though unclear accuracy.      LABS  Labs reviewed    MEDICATIONS  Medications reviewed including Lasix      ASSESSED NUTRITION NEEDS PER APPROVED PRACTICE GUIDELINES:    Dosing Weight 85.5 kg - assumed dry wt (will need to monitor)  Estimated Energy Needs: >/=2138 kcals (>/=25 Kcal/Kg)  Justification: maintenance  Estimated Protein Needs: 103-128 grams protein (1.2-1.5 g pro/Kg)  Justification: preservation of lean body mass and advanced age  Estimated Fluid Needs: per MD    MALNUTRITION:  % Weight Loss:  Weight loss does not meet criteria for malnutrition --> suspected fluid shifts  % Intake:  No decreased intake noted --> patient denied during admit, no indication of decreased PO intakes during limited admit  Subcutaneous Fat Loss:  Upper arm region moderate to severe depletion --> not using as indicator given only in 1 region  Muscle Loss:  Temporal region moderate depletion, Clavicle bone region moderate to severe depletion, Acromion bone region moderate to severe depletion and Dorsal hand region moderate depletion --> LEs masked by fluid  Fluid Retention:  Mild to moderate --> weak 2nd indictor and not clearly masking true wt trends    Malnutrition Diagnosis: Patient does not meet two of the above criteria necessary for diagnosing malnutrition    NUTRITION DIAGNOSIS:  Predicted suboptimal nutrient intake (energy/protein) related to potential for decline in PO intakes dependent upon hospital LOS.      NUTRITION INTERVENTIONS  Recommendations / Nutrition " Prescription  Continue diet as ordered.      Implementation  Nutrition education: Not appropriate at this time due to patient condition.  Current RN reports patient is not appropriate for education.  Left 2 gram Na diet handout in room in the event patient has family or if needed for living facility.    Collaboration and Referral of Nutrition care: Discussed POC with team during rounds and with RN.      Nutrition Goals  Patient to consume at least 75% of meals TID.       MONITORING AND EVALUATION:  Progress towards goals will be monitored and evaluated per protocol and Practice Guidelines            Kacy Kiser RD, LD  Clinical Dietitian  3rd floor/ICU: [phone]  All other floors: [phone]  Weekend/holiday: [phone]

## 2019-11-27 NOTE — PLAN OF CARE
VSS. Pt denies pain. Continues to have 2+3+ edema to extremities, abdomen rounded, pelvis area also edematous. Disoriented to time/place/situation, forgetful when redirected. Pleasant. Removed garcia 0615 this am; had 450ml out.. 2gm sodium diet. Assist of 1 to turn in bed.

## 2019-11-28 LAB
ANION GAP SERPL CALCULATED.3IONS-SCNC: 6 MMOL/L (ref 3–14)
BUN SERPL-MCNC: 28 MG/DL (ref 7–30)
CALCIUM SERPL-MCNC: 8.5 MG/DL (ref 8.5–10.1)
CHLORIDE SERPL-SCNC: 104 MMOL/L (ref 94–109)
CO2 SERPL-SCNC: 29 MMOL/L (ref 20–32)
CREAT SERPL-MCNC: 1.44 MG/DL (ref 0.66–1.25)
GFR SERPL CREATININE-BSD FRML MDRD: 42 ML/MIN/{1.73_M2}
GLUCOSE SERPL-MCNC: 85 MG/DL (ref 70–99)
POTASSIUM SERPL-SCNC: 3.6 MMOL/L (ref 3.4–5.3)
SODIUM SERPL-SCNC: 139 MMOL/L (ref 133–144)

## 2019-11-28 PROCEDURE — 99232 SBSQ HOSP IP/OBS MODERATE 35: CPT | Performed by: INTERNAL MEDICINE

## 2019-11-28 PROCEDURE — 12000000 ZZH R&B MED SURG/OB

## 2019-11-28 PROCEDURE — 25000128 H RX IP 250 OP 636: Performed by: INTERNAL MEDICINE

## 2019-11-28 PROCEDURE — 36415 COLL VENOUS BLD VENIPUNCTURE: CPT | Performed by: INTERNAL MEDICINE

## 2019-11-28 PROCEDURE — 25000132 ZZH RX MED GY IP 250 OP 250 PS 637: Mod: GY | Performed by: INTERNAL MEDICINE

## 2019-11-28 PROCEDURE — 80048 BASIC METABOLIC PNL TOTAL CA: CPT | Performed by: INTERNAL MEDICINE

## 2019-11-28 RX ADMIN — FOLIC ACID 1 MG: 1 TABLET ORAL at 08:55

## 2019-11-28 RX ADMIN — PANTOPRAZOLE SODIUM 40 MG: 40 TABLET, DELAYED RELEASE ORAL at 08:55

## 2019-11-28 RX ADMIN — DOXAZOSIN 4 MG: 4 TABLET ORAL at 21:48

## 2019-11-28 RX ADMIN — FUROSEMIDE 20 MG: 10 INJECTION, SOLUTION INTRAMUSCULAR; INTRAVENOUS at 17:29

## 2019-11-28 RX ADMIN — FUROSEMIDE 20 MG: 10 INJECTION, SOLUTION INTRAMUSCULAR; INTRAVENOUS at 08:57

## 2019-11-28 RX ADMIN — METOPROLOL SUCCINATE 25 MG: 25 TABLET, EXTENDED RELEASE ORAL at 08:55

## 2019-11-28 RX ADMIN — ASPIRIN 81 MG: 81 TABLET, COATED ORAL at 08:55

## 2019-11-28 RX ADMIN — TAMSULOSIN HYDROCHLORIDE 0.4 MG: 0.4 CAPSULE ORAL at 08:55

## 2019-11-28 ASSESSMENT — MIFFLIN-ST. JEOR: SCORE: 1616.25

## 2019-11-28 ASSESSMENT — ACTIVITIES OF DAILY LIVING (ADL)
ADLS_ACUITY_SCORE: 19
ADLS_ACUITY_SCORE: 20
ADLS_ACUITY_SCORE: 19
ADLS_ACUITY_SCORE: 20
ADLS_ACUITY_SCORE: 20
ADLS_ACUITY_SCORE: 19

## 2019-11-28 NOTE — PROGRESS NOTES
[facility]    Medicine Progress Note - Hospitalist Service       Date of Admission:  11/25/2019    Assessment & Plan        Lopez Mcdermott is a 89 year old male with history including moderate severe aortic stenosis, Macdonald's esophagus, subdural hematoma in 1/2019, BPH who requires intermittent straight cath, CKD stage II, chronic A. fib not on anticoagulant and or aspirin, HTN, iron deficiency anemia, AAA, and GI bleed who presents with abdominal distention and weight gain.  The patient fell a few days ago and was evaluated in the ED with a CT head and C-spine that were negative.  He notes increasing abdominal distention over the past 2 to 3 days and moderate discomfort with this although no specific pain per se.  He is also noted that he has had significantly increased lower extremity swelling right greater than left over this period of time and weight gain.  Patient was brought to the emergency room and admitted to the hospital for further work-up and evaluation treatment.    1.  Abdominal pain, small-volume ascites likely secondary to CHF.  -Ultrasound showed small volume ascites.  -Patient has no abdominal tenderness, no fever, no leukocytosis, given the size no need to do paracentesis at this point.  -CT abdomen pelvis with contrast at outside facility report in the ED note states extensive ascites but ultrasound stated small size.  -CT also reported infrarenal abdominal aortic aneurysm measuring 51 x 50 mm at maximum dimension he has known history, we will follow-up with his providers as an outpatient.  -Patient albumin is 4, INR is 1.23, doubt cirrhosis, but needs outpatient follow-up  -Continue IV diuretics, hold off paracentesis at this point, patient is not symptomatic and there is no sign of infection.  -Jimenez catheter was placed initially in the ED for strict I/o along with urinary retention     2.  Acute diastolic and possible mild systolic CHF, elevated troponin, severe aortic stenosis,  right pleural effusion, HTN, HLD:   -Patient has significant lower extremity edema, right leg more than left .  Right lower extremity ultrasound ruled out DVT.  -He has moderate right pleural effusion on chest x-ray, which is not new.  -BNP is elevated at 16,000.  Troponin is elevated at 0.055.  -No chest pain,  EKG showed A. fib without any ischemic changes, likely demand ischemia.  -He does have known moderate to severe aortic stenosis, with systolic murmur consistent with aortic stenosis.  -We will gently diurese him, this patient needs afterload due to aortic stenosis  -Continue IV Lasix 20 mg twice a day.  -Echo showed EF of 45 to 50%, severe aortic stenosis, global hypokinesia of the left ventricle, dilated right ventricle with reduced right ventricular function.  -Cardiology consulted, input appreciated, recommended to continue with IV diuretics for today, transition to place by tomorrow, and to follow-up with his primary cardiologist.  -His symptoms could be due to worsening signs of aortic stenosis  -Continue Metoprolol succinate 25 mg daily, continue aspirin 81 mg aspirin.  -Hold off thoracentesis for now, as the fluid seize is moderate and not causing any respiratory distress.  -Chest x-ray showed right pleural effusion with associated atelectasis which seems slightly increased from prior x-ray.  Patient is asymptomatic  -Continue to diurese the patient.   -If she is symptomatic, hypoxic, we will consider thoracentesis.     3. Chronic Afib: Not on anticoagulation secondary to previous GI bleed and intracranial bleeding.   -Continue metoprolol as ordered.  -Monitor on telemetry.     4.  Fall: Patient fell down few days ago and had extensive work-up for this at the time, including imaging with CT head, C-spine, chest x-ray without any obvious injury. Suspect related to his significant weight gain and edema.  -PT consult  -Fall precautions      5. YOLANDA on CKD stage II:  admission creatinine of 1.48 with  baseline 1.2.  Suspect related to his acute CHF and will improve with diuretics.  -bmp tomorrow     6.  BPH: Continue PTA doxazosin 4 mg at bedtime.  Patient does intermittently straight cath at home.  -Jimenez was placed on admission, discontinued after 24 hours.  -Continue doxazosin and add Flomax daily.     7.  AAA: Known AAA that has been followed regularly.  Again seen on his recent CT abdomen pelvis with IV contrast is a 5 x 5 cm AAA, slightly increased from previous 4 x 4 centimeter.  He will follow-up with his vascular surgeon as an outpatient.     Diet: 2 Gram Sodium Diet    DVT Prophylaxis: Pneumatic Compression Devices  Jimenez Catheter: not present  Code Status: DNR      Disposition Plan   Expected discharge: Expect at least 1-2 days more, if continues to improve, recommended to prior living arrangement once .  Entered: Asad Shields MD 11/28/2019, 12:18 PM     I discussed with patient at length the plan of care all his question and concerns addressed also discussed with RN.    Asda Shields MD  Hospitalist Service  [facility]    ______________________________________________________________________    Interval History   Patient seen and examined, no new overnight issues, feels better, lower extremity swelling improving, no shortness of breath, no cough or runny nose, no chest pain or palpitation or dizziness. Producing good urine, no fever or chills, on IV diuretics.  Weight is not improving significantly, only lost about 4 pounds since admission.  Clinically his edema is subsiding.    Data reviewed today: I reviewed all medications, new labs and imaging results over the last 24 hours. I personally reviewed .    Physical Exam   Vital Signs: Temp: 96.6  F (35.9  C) Temp src: Oral BP: 133/69   Heart Rate: 71 Resp: 20 SpO2: 93 % O2 Device: None (Room air)    Weight: 209 lbs 6.99 oz  General Appearance: Alert and oriented, not in distress  Respiratory: Decreased breath sound  posterior lower half more on the right lung field, scattered crackles,no wheezing.  CVS: S1 and S2 well heard, systolic murmur grade 3/6 right sternal border radiating to the neck, irregularly irregular  GI: Soft, nontender, slightly distended, positive bowel sounds organomegaly.  Skin: No rash, exanthems or petechia, no jaundice.  Extremity: Right lower extremity relatively swollen when compared to the left.  +2 pitting edema right side +1-2 pitting edema left side.  The right lower extremity swelling improved today    Data   Recent Labs   Lab 11/28/19  0648 11/27/19  0643 11/26/19  0611 11/25/19  2212 11/25/19  1726   WBC  --   --  6.7  --  7.7   HGB  --   --  11.4*  --  11.7*   MCV  --   --  107*  --  103*   PLT  --   --  128*  --  147*   INR  --   --   --   --  1.23*    140 140  --  142   POTASSIUM 3.6 3.6 3.7  --  4.0   CHLORIDE 104 104 106  --  108   CO2 29 28 28  --  29   BUN 28 25 23  --  22   CR 1.44* 1.42* 1.45*  --  1.48*   ANIONGAP 6 8 6  --  5   KENY 8.5 8.4* 8.6  --  8.6   GLC 85 89 83  --  95   ALBUMIN  --   --   --   --  4.0   PROTTOTAL  --   --   --   --  7.6   BILITOTAL  --   --   --   --  1.1   ALKPHOS  --   --   --   --  92   ALT  --   --   --   --  21   AST  --   --   --   --  29   TROPI  --   --  0.053* 0.048* 0.055*     No results found for this or any previous visit (from the past 24 hour(s)).  Medications       aspirin  81 mg Oral Daily     doxazosin  4 mg Oral At Bedtime     folic acid  1 mg Oral Daily     furosemide  20 mg Intravenous BID     metoprolol succinate ER  25 mg Oral Daily     pantoprazole  40 mg Oral Daily     sodium chloride (PF)  3 mL Intracatheter Q8H     tamsulosin  0.4 mg Oral Daily

## 2019-11-28 NOTE — PROGRESS NOTES
Heart Failure Care Pathway  GOALS TO BE MET BEFORE DISCHARGE:    1. Decrease congestion and/or edema with diuretic therapy to achieve near      optimal volume status.            Dyspnea improved:  Yes            Edema improved:     Yes        Net I/O and Weights since admission:          10/29 0700 - 11/28 0659  In: 1680 [P.O.:1680]  Out: 6045 [Urine:6045]  Net: -4365            Vitals:    11/25/19 2129 11/26/19 0500 11/27/19 0618 11/28/19 0546   Weight: 96.6 kg (213 lb) 95.7 kg (211 lb) 94.3 kg (208 lb) (P) 95 kg (209 lb 7 oz)       2.  O2 sats > 92% on RA or at prior home O2 therapy level.          Current oxygenation status:       SpO2: 91 %         O2 Device: None (Room air),            Able to wean O2 this shift to keep sats > 92%: Pt on room air       Does patient use Home O2? No    3.  Tolerates ambulation and mobility near baseline: Yes        How many times did the patient ambulate with nursing staff this shift? 3    Please review the Heart Failure Care Pathway for additional HF goal outcomes.    VS: WDL  Mobility: Up w/1 walker   Orientation: Disorientated to place and time, forgetful  Respiratory: Clear, diminished  Cardiac: Murmur detected, tele: Afib, CVR  Skin: blanchable redness through buttocks and groin, bruising  skin tear on LL posterior arm small amount of moist, sanginous drainage on dressing   GI: WDL  : WDL  CMS: +1 to +3 edema throughout  Plan: Discharge back to [facility]    Heydi MATIAS  11/28/2019

## 2019-11-28 NOTE — PLAN OF CARE
Heart Failure Care Pathway  GOALS TO BE MET BEFORE DISCHARGE:    1. Decrease congestion and/or edema with diuretic therapy to achieve near      optimal volume status.            Dyspnea improved:  Yes            Edema improved:     Yes        Net I/O and Weights since admission:          10/28 2300 - 11/27 2259  In: 1680 [P.O.:1680]  Out: 5545 [Urine:5545]  Net: -3865            Vitals:    11/25/19 2129 11/26/19 0500 11/27/19 0618   Weight: 96.6 kg (213 lb) 95.7 kg (211 lb) 94.3 kg (208 lb)       2.  O2 sats > 92% on RA or at prior home O2 therapy level.          Current oxygenation status:       SpO2: 95 %  RA         Able to wean O2 this shift to keep sats > 92%:  patient on RA       Does patient use Home O2?  no    3.  Tolerates ambulation and mobility near baseline: yes        How many times did the patient ambulate with nursing staff this shift? X2, walked in room and hallway    Please review the Heart Failure Care Pathway for additional HF goal outcomes.    Alee Monge RN RN  11/27/2019

## 2019-11-28 NOTE — PROGRESS NOTES
[facility] Cardiology Progress Note             Assessment and Plan:   Assessment:   1.  Acute CHF exacerbation (H) of diastolic heart failure.  This 89 year old male has known severe aortic stenosis and severe pulmonary hypertension.  He presents with with worsening shortness of breath.  He has significant dementia and cannot give much details.  He does note he does not want any cardiac interventions.  We continue to treat medically.  His prognosis is poor given the two above diagnosis.  Would continue with oral diuresis and beta blocker as able.  Flat troponins without evidence of acute MI.  2.  Dementia.   3.  Abdominal aortic aneurysm, 5.1 x 5 mm with possible rapid progression.   4.  History of traumatic subdural hematoma.   5.  History of gastrointestinal bleeding.   6.  Permanent atrial fibrillation, not on oral anticoagulation due to the above 2 reasons.   7.  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   8.  Benign prostatic hypertrophy with self-catheterization.        Plan:   1.  Continue with IV diuresis today and move to oral furosemide tomorrow if stable.  2.  Continue beta blocker.  3.  No anticoagulation given history of GI bleeding and subdural hematoma.  4.  Home in the next few days with F/U with cardiology at [facility].     Attestation:  I have reviewed today's vital signs, notes, medications, labs and imaging.  Care coordination / counseling time: 10 minutes  Face-to-face time: 15 minutes  Total time: 25 minutes    Joseph De La Rosa MD, FACC  November 28, 2019 9:58 AM       Interval History:   Pain free and notes less shortness of breath.           Review of Systems:   A comprehensive review of systems was performed and found to be negative except as described in this note          Medications:       aspirin  81 mg Oral Daily     doxazosin  4 mg Oral At Bedtime     folic acid  1 mg Oral Daily     furosemide  20 mg Intravenous BID     metoprolol succinate ER  25 mg Oral Daily      "pantoprazole  40 mg Oral Daily     sodium chloride (PF)  3 mL Intracatheter Q8H     tamsulosin  0.4 mg Oral Daily     acetaminophen, lidocaine 4%, lidocaine (buffered or not buffered), magnesium sulfate, melatonin, miconazole, naloxone, ondansetron **OR** ondansetron, polyethylene glycol, potassium chloride, potassium chloride with lidocaine, potassium chloride, potassium chloride, senna-docusate **OR** senna-docusate, sodium chloride (PF)             Physical Exam:   Blood pressure 133/69, temperature 96.6  F (35.9  C), temperature source Oral, resp. rate 20, height 1.778 m (5' 10\"), weight 95 kg (209 lb 7 oz), SpO2 93 %.    Intake/Output Summary (Last 24 hours) at 11/28/2019 0951  Last data filed at 11/28/2019 0500  Gross per 24 hour   Intake 240 ml   Output 1920 ml   Net -1680 ml     Rhythm:  Atrial fibrillation with controlled ventricular response.     Lungs:   no increased work of breathing and diminished breath sounds right lower lung.  No wheezes, rales or rhonchi.     Cardiovascular:   irregularly irregular rhythm, normal S1 and S2, no S3, no S4, 2-3/6 JOYA LLSB to cardotids and 1-2+ LE edema.     Abdomen:   No scars, normal bowel sounds, soft, non-distended, non-tender, no masses palpated, no hepatosplenomegally and obese.     Neurologic:   Mental Status Exam:  Level of Alertness:   awake  Orientation:   place  Cranial Nerves:  cranial nerves II-XII are grossly intact     Neuropsychiatric:   General: normal, calm and normal eye contact            Data:   Recent Labs   Lab 11/26/19  0611 11/25/19  1726   WBC 6.7 7.7   HGB 11.4* 11.7*   HCT 37.6* 36.7*   * 103*   * 147*     Recent Labs   Lab 11/28/19  0648 11/27/19  0643 11/26/19  0611    140 140   POTASSIUM 3.6 3.6 3.7   CHLORIDE 104 104 106   CO2 29 28 28   ANIONGAP 6 8 6   GLC 85 89 83   BUN 28 25 23   CR 1.44* 1.42* 1.45*   GFRESTIMATED 42* 43* 42*   GFRESTBLACK 49* 50* 49*   KENY 8.5 8.4* 8.6     Recent Labs   Lab 11/26/19  0611 " 11/25/19  2212 11/25/19  1726   TROPI 0.053* 0.048* 0.055*             EKG results:   I have reviewed this patient's telemetry with the following interpretation:        Rate: 50-65  Atrial fibrillation  Premature ventricular complexes      Other cardiac studies:   Complete Portable Echo Adult.  _____________________________________________________________________________  __        Interpretation Summary     The visual ejection fraction is estimated at 45-50%.  There is mild global hypokinesia of the left ventricle.  There is mild to moderate concentric left ventricular hypertrophy.  The right ventricle is moderately dilated.  The right ventricular systolic function is mild to moderately reduced.  Severe valvular aortic stenosis.  The mean AoV pressure gradient is 38mmHg.  DVI 0.19  A bicuspid aortic valve cannot be excluded.  The right atrium is moderate to severely dilated.  Severe (>55mmHg) pulmonary hypertension is present.  IVC diameter >2.1 cm collapsing <50% with sniff suggests a high RA pressure  estimated at 15 mmHg or greater.  Dilation of the inferior vena cava (3.6 cm) is present with abnormal  respiratory variation in diameter.  There is no comparison study available.    Radiology:  CHEST TWO VIEWS  11/27/2019 11:06 AM      HISTORY: Follow-up pleural effusion     COMPARISON: 11/25/2019 chest x-ray                                                                      IMPRESSION: Slight increase in right pleural effusion and associated  atelectasis or infiltrate. Small left pleural effusion not  significantly changed. Stable cardiac silhouette. Left lung clear.  Pulmonary vessels within normal limits.     MD Joseph MIRANDA MD, [facility] 11/28/2019  9:51 AM

## 2019-11-29 LAB
ANION GAP SERPL CALCULATED.3IONS-SCNC: 6 MMOL/L (ref 3–14)
BUN SERPL-MCNC: 27 MG/DL (ref 7–30)
CALCIUM SERPL-MCNC: 8.6 MG/DL (ref 8.5–10.1)
CHLORIDE SERPL-SCNC: 102 MMOL/L (ref 94–109)
CO2 SERPL-SCNC: 30 MMOL/L (ref 20–32)
CREAT SERPL-MCNC: 1.38 MG/DL (ref 0.66–1.25)
GFR SERPL CREATININE-BSD FRML MDRD: 44 ML/MIN/{1.73_M2}
GLUCOSE SERPL-MCNC: 86 MG/DL (ref 70–99)
POTASSIUM SERPL-SCNC: 3.7 MMOL/L (ref 3.4–5.3)
SODIUM SERPL-SCNC: 138 MMOL/L (ref 133–144)

## 2019-11-29 PROCEDURE — 80048 BASIC METABOLIC PNL TOTAL CA: CPT | Performed by: INTERNAL MEDICINE

## 2019-11-29 PROCEDURE — 12000000 ZZH R&B MED SURG/OB

## 2019-11-29 PROCEDURE — 25000132 ZZH RX MED GY IP 250 OP 250 PS 637: Mod: GY | Performed by: INTERNAL MEDICINE

## 2019-11-29 PROCEDURE — 36415 COLL VENOUS BLD VENIPUNCTURE: CPT | Performed by: INTERNAL MEDICINE

## 2019-11-29 PROCEDURE — 99232 SBSQ HOSP IP/OBS MODERATE 35: CPT | Performed by: INTERNAL MEDICINE

## 2019-11-29 PROCEDURE — 25000128 H RX IP 250 OP 636: Performed by: INTERNAL MEDICINE

## 2019-11-29 RX ORDER — FUROSEMIDE 40 MG
40 TABLET ORAL
Status: DISCONTINUED | OUTPATIENT
Start: 2019-11-29 | End: 2019-11-30 | Stop reason: HOSPADM

## 2019-11-29 RX ADMIN — METOPROLOL SUCCINATE 25 MG: 25 TABLET, EXTENDED RELEASE ORAL at 09:21

## 2019-11-29 RX ADMIN — FUROSEMIDE 40 MG: 40 TABLET ORAL at 16:53

## 2019-11-29 RX ADMIN — PANTOPRAZOLE SODIUM 40 MG: 40 TABLET, DELAYED RELEASE ORAL at 09:21

## 2019-11-29 RX ADMIN — TAMSULOSIN HYDROCHLORIDE 0.4 MG: 0.4 CAPSULE ORAL at 09:21

## 2019-11-29 RX ADMIN — FOLIC ACID 1 MG: 1 TABLET ORAL at 09:21

## 2019-11-29 RX ADMIN — FUROSEMIDE 20 MG: 10 INJECTION, SOLUTION INTRAMUSCULAR; INTRAVENOUS at 09:20

## 2019-11-29 RX ADMIN — DOXAZOSIN 4 MG: 4 TABLET ORAL at 21:23

## 2019-11-29 RX ADMIN — ASPIRIN 81 MG: 81 TABLET, COATED ORAL at 09:21

## 2019-11-29 ASSESSMENT — ACTIVITIES OF DAILY LIVING (ADL)
ADLS_ACUITY_SCORE: 19

## 2019-11-29 ASSESSMENT — MIFFLIN-ST. JEOR: SCORE: 1608.82

## 2019-11-29 NOTE — PLAN OF CARE
"VS /74 (BP Location: Left arm)   Temp 96.3  F (35.7  C) (Oral)   Resp 16   Ht 1.778 m (5' 10\")   Wt 95 kg (209 lb 7 oz)   SpO2 93%   BMI 30.05 kg/m    Orientation A&Ox4   Lung sounds Dim    O2 RA   Tele Afib/lyric CVR occ  PVC's  Bowel sounds WNL    voiding adequately   Tolerating 2 gm Na diet  IVF PIV SL   Dressings Transparent drsg intact to L  Arm   CMS Intact   Drains none   Activity up with 1 assist walker/GB   Pain Denies   Discharge plan TBD. Will continue supportive cares and continue to monitor .     "

## 2019-11-29 NOTE — PLAN OF CARE
Pt alert and oriented x 4. Apical pulse irregular, Tele A-fib CVR/lyric with intermittent PVC's. Lung sounds diminished with scattered crackles and wheezes. Moderate abdominal edema, BLE edema, Left arm skin tear with drainage- dressing changed. 2 staples removed from top of pts head. Last BM 11/28/19, pt voiding spontaneously. Pt ambulated X 2 in hallways.   Plan: Continue diuretics, ambulate 3-4 times per day.

## 2019-11-29 NOTE — PLAN OF CARE
Pt A/O x 4, VSS, pt denies pain, headache, dizziness, n/v & SOB. Pt up Asst: 1 w/gait belt & walker. Lung sounds diminished/crackles, RA. Tele: Afib Owen/CVR, BBB, w/PVCs. IV Lasix, transitioning to PO Lasix. BLE edema +2. Skin tear on left arm, tegaderm in-place. Possible discharge back to home tomorrow. Will continue with plan of care.    Heart Failure Care Pathway  GOALS TO BE MET BEFORE DISCHARGE:    1. Decrease congestion and/or edema with diuretic therapy to achieve near      optimal volume status.            Dyspnea improved:  Yes            Edema improved:     Yes        Net I/O and Weights since admission:          10/30 2300 - 11/29 2259  In: 2560 [P.O.:2560]  Out: 7785 [Urine:7785]  Net: -5225            Vitals:    11/25/19 2129 11/26/19 0500 11/27/19 0618 11/28/19 0546   Weight: 96.6 kg (213 lb) 95.7 kg (211 lb) 94.3 kg (208 lb) 95 kg (209 lb 7 oz)    11/29/19 0655   Weight: 94.3 kg (207 lb 12.8 oz)       2.  O2 sats > 92% on RA or at prior home O2 therapy level.          Current oxygenation status:       SpO2: 95 %         O2 Device: None (Room air),            Able to wean O2 this shift to keep sats > 92%:  Yes       Does patient use Home O2? No    3.  Tolerates ambulation and mobility near baseline: Yes        How many times did the patient ambulate with nursing staff this shift? 3    Please review the Heart Failure Care Pathway for additional HF goal outcomes.    Brittaney Reis, RN RN  11/29/2019

## 2019-11-29 NOTE — PROGRESS NOTES
[facility] Cardiology Progress Note             Assessment and Plan:   Assessment:    1.  Acute CHF exacerbation (H) of diastolic heart failure.  This 89 year old male has known severe aortic stenosis and severe pulmonary hypertension.  He presents with with worsening shortness of breath.  He has significant dementia and cannot give much details.  He does note he does not want any cardiac interventions.  We continue to treat medically.  His prognosis is poor given the two above diagnosis.  Would continue with oral diuresis and beta blocker as able.  Flat troponins without evidence of acute MI.  2.  Dementia.   3.  Abdominal aortic aneurysm, 5.1 x 5 mm with possible rapid progression.   4.  History of traumatic subdural hematoma.   5.  History of gastrointestinal bleeding.   6.  Permanent atrial fibrillation, not on oral anticoagulation due to the above 2 reasons.   7.  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   8.  Benign prostatic hypertrophy with occasional self-catheterization.             Plan:   1.  Continue oral furosemide with home tomorrow or Sunday if stable.  2.  Continue beta blocker.  3.  No anticoagulation given history of GI bleeding and subdural hematoma.  4.  Home in the next few days with F/U with cardiology at [facility].     Attestation:  I have reviewed today's vital signs, notes, medications, labs and imaging.  Face-to-face time: 10 minutes  Total time: 25 minutes    Joseph De La Rosa MD, FACC  November 29, 2019 12:45 PM       Interval History:   Pain free and denies shortness of breath.           Review of Systems:   A comprehensive review of systems was performed and found to be negative except as described in this note          Medications:       aspirin  81 mg Oral Daily     doxazosin  4 mg Oral At Bedtime     folic acid  1 mg Oral Daily     furosemide  40 mg Oral BID     metoprolol succinate ER  25 mg Oral Daily     pantoprazole  40 mg Oral Daily     sodium chloride (PF)  3 mL  "Intracatheter Q8H     tamsulosin  0.4 mg Oral Daily     acetaminophen, lidocaine 4%, lidocaine (buffered or not buffered), magnesium sulfate, melatonin, miconazole, naloxone, ondansetron **OR** ondansetron, polyethylene glycol, potassium chloride, potassium chloride with lidocaine, potassium chloride, potassium chloride, senna-docusate **OR** senna-docusate, sodium chloride (PF)             Physical Exam:   Blood pressure (!) 145/73, temperature 95.4  F (35.2  C), temperature source Oral, resp. rate 18, height 1.778 m (5' 10\"), weight 94.3 kg (207 lb 12.8 oz), SpO2 92 %.    Intake/Output Summary (Last 24 hours) at 11/29/2019 1243  Last data filed at 11/29/2019 1011  Gross per 24 hour   Intake 480 ml   Output 1390 ml   Net -910 ml     Rhythm:  Atrial fibrillation with controlled ventricular response.     Constitutional:   awake, alert, cooperative, no apparent distress, and appears stated age     Lungs:   no increased work of breathing and diminished breath sounds right base with few bilateral expiratory wheezes.     Cardiovascular:   irregularly irregular rhythm, normal S1 and S2, no S3, 2-3/6 JOYA LLSB to carotids and 1-2+ LLE edema and 1+ RLE edema.  No JVD, HJR.     Abdomen:   No scars, normal bowel sounds, soft, non-distended, non-tender, no masses palpated, no hepatosplenomegally     Neurologic:   Mental Status Exam:  Level of Alertness:   awake  Cranial Nerves:  cranial nerves II-XII are grossly intact     Neuropsychiatric:   General: normal, calm and normal eye contact            Data:   Recent Labs   Lab 11/26/19  0611 11/25/19  1726   WBC 6.7 7.7   HGB 11.4* 11.7*   HCT 37.6* 36.7*   * 103*   * 147*     Recent Labs   Lab 11/29/19  0553 11/28/19  0648 11/27/19  0643    139 140   POTASSIUM 3.7 3.6 3.6   CHLORIDE 102 104 104   CO2 30 29 28   ANIONGAP 6 6 8   GLC 86 85 89   BUN 27 28 25   CR 1.38* 1.44* 1.42*   GFRESTIMATED 44* 42* 43*   GFRESTBLACK 52* 49* 50*   KENY 8.6 8.5 8.4*     Recent " Labs   Lab 11/26/19  0611 11/25/19  2212 11/25/19  1726   TROPI 0.053* 0.048* 0.055*             EKG results:   I have reviewed this patient's EKG with the following interpretation:        Rate: 66  Atrial fibrillation      Other cardiac studies:   Complete Portable Echo Adult.  _____________________________________________________________________________  __        Interpretation Summary     The visual ejection fraction is estimated at 45-50%.  There is mild global hypokinesia of the left ventricle.  There is mild to moderate concentric left ventricular hypertrophy.  The right ventricle is moderately dilated.  The right ventricular systolic function is mild to moderately reduced.  Severe valvular aortic stenosis.  The mean AoV pressure gradient is 38mmHg.  DVI 0.19  A bicuspid aortic valve cannot be excluded.  The right atrium is moderate to severely dilated.  Severe (>55mmHg) pulmonary hypertension is present.  IVC diameter >2.1 cm collapsing <50% with sniff suggests a high RA pressure  estimated at 15 mmHg or greater.  Dilation of the inferior vena cava (3.6 cm) is present with abnormal  respiratory variation in diameter.  There is no comparison study available.    Radiology:  CHEST TWO VIEWS  11/27/2019 11:06 AM      HISTORY: Follow-up pleural effusion     COMPARISON: 11/25/2019 chest x-ray        IMPRESSION: Slight increase in right pleural effusion and associated  atelectasis or infiltrate. Small left pleural effusion not  significantly changed. Stable cardiac silhouette. Left lung clear.  Pulmonary vessels within normal limits.     LUIS DENSON MD  EXAM: US ABDOMEN LIMITED  LOCATION: [facility]  DATE/TIME: 11/25/2019 11:23 PM     INDICATION: Evaluate liver, site  COMPARISON: None.  TECHNIQUE: Limited abdominal ultrasound.     FINDINGS:     GALLBLADDER: Gallstones. No gallbladder wall thickening. Negative sonographic Barron sign.     BILE DUCTS: No biliary dilatation. The common duct measures  3.8 mm.     LIVER: Normal parenchyma with smooth contour. Enlarged measuring up to 20 cm. 2 cm cyst.     RIGHT KIDNEY: No hydronephrosis.     PANCREAS: The visualized portions are normal.     Small volume ascites ascites.                                                                      IMPRESSION:  1.  Ascites.  2.  Gallstones without other sonographic evidence of cholecystitis.  3.  Hepatomegaly with 2 cm hepatic cyst.        Joseph De La Rosa MD, [facility] 11/29/2019  12:43 PM

## 2019-11-30 VITALS
SYSTOLIC BLOOD PRESSURE: 121 MMHG | BODY MASS INDEX: 29.43 KG/M2 | DIASTOLIC BLOOD PRESSURE: 68 MMHG | OXYGEN SATURATION: 95 % | RESPIRATION RATE: 16 BRPM | WEIGHT: 205.6 LBS | HEIGHT: 70 IN | TEMPERATURE: 97.5 F

## 2019-11-30 LAB
ANION GAP SERPL CALCULATED.3IONS-SCNC: 5 MMOL/L (ref 3–14)
BUN SERPL-MCNC: 29 MG/DL (ref 7–30)
CALCIUM SERPL-MCNC: 8.5 MG/DL (ref 8.5–10.1)
CHLORIDE SERPL-SCNC: 103 MMOL/L (ref 94–109)
CO2 SERPL-SCNC: 29 MMOL/L (ref 20–32)
CREAT SERPL-MCNC: 1.36 MG/DL (ref 0.66–1.25)
GFR SERPL CREATININE-BSD FRML MDRD: 45 ML/MIN/{1.73_M2}
GLUCOSE SERPL-MCNC: 86 MG/DL (ref 70–99)
POTASSIUM SERPL-SCNC: 3.7 MMOL/L (ref 3.4–5.3)
SODIUM SERPL-SCNC: 137 MMOL/L (ref 133–144)

## 2019-11-30 PROCEDURE — 25000132 ZZH RX MED GY IP 250 OP 250 PS 637: Mod: GY | Performed by: INTERNAL MEDICINE

## 2019-11-30 PROCEDURE — 36415 COLL VENOUS BLD VENIPUNCTURE: CPT | Performed by: INTERNAL MEDICINE

## 2019-11-30 PROCEDURE — 80048 BASIC METABOLIC PNL TOTAL CA: CPT | Performed by: INTERNAL MEDICINE

## 2019-11-30 PROCEDURE — 99239 HOSP IP/OBS DSCHRG MGMT >30: CPT | Performed by: INTERNAL MEDICINE

## 2019-11-30 RX ORDER — FUROSEMIDE 40 MG
40 TABLET ORAL
Qty: 60 TABLET | Refills: 1 | Status: SHIPPED | OUTPATIENT
Start: 2019-11-30

## 2019-11-30 RX ADMIN — METOPROLOL SUCCINATE 25 MG: 25 TABLET, EXTENDED RELEASE ORAL at 08:16

## 2019-11-30 RX ADMIN — TAMSULOSIN HYDROCHLORIDE 0.4 MG: 0.4 CAPSULE ORAL at 08:16

## 2019-11-30 RX ADMIN — FOLIC ACID 1 MG: 1 TABLET ORAL at 08:16

## 2019-11-30 RX ADMIN — PANTOPRAZOLE SODIUM 40 MG: 40 TABLET, DELAYED RELEASE ORAL at 08:16

## 2019-11-30 RX ADMIN — ASPIRIN 81 MG: 81 TABLET, COATED ORAL at 08:16

## 2019-11-30 RX ADMIN — FUROSEMIDE 40 MG: 40 TABLET ORAL at 08:16

## 2019-11-30 ASSESSMENT — ACTIVITIES OF DAILY LIVING (ADL)
ADLS_ACUITY_SCORE: 19
ADLS_ACUITY_SCORE: 20
ADLS_ACUITY_SCORE: 19
ADLS_ACUITY_SCORE: 19

## 2019-11-30 ASSESSMENT — MIFFLIN-ST. JEOR: SCORE: 1598.85

## 2019-11-30 NOTE — PLAN OF CARE
Heart Failure Care Pathway  GOALS TO BE MET BEFORE DISCHARGE:    1. Decrease congestion and/or edema with diuretic therapy to achieve near      optimal volume status.            Dyspnea improved:  Yes            Edema improved:     improving        Net I/O and Weights since admission:          10/31 0700 - 11/30 0659  In: 2560 [P.O.:2560]  Out: 9085 [Urine:9085]  Net: -6525            Vitals:    11/25/19 2129 11/26/19 0500 11/27/19 0618 11/28/19 0546   Weight: 96.6 kg (213 lb) 95.7 kg (211 lb) 94.3 kg (208 lb) 95 kg (209 lb 7 oz)    11/29/19 0655 11/30/19 0416   Weight: 94.3 kg (207 lb 12.8 oz) 93.3 kg (205 lb 9.6 oz)       2.  O2 sats > 92% on RA or at prior home O2 therapy level.          Current oxygenation status:       SpO2: 94 %         O2 Device: None (Room air),            Able to wean O2 this shift to keep sats > 92%:  Yes       Does patient use Home O2? No    3.  Tolerates ambulation and mobility near baseline: Yes        How many times did the patient ambulate with nursing staff this shift? 1    Please review the Heart Failure Care Pathway for additional HF goal outcomes.    Belgica Leahy RN RN  11/30/2019    Pt is a/o, up with SBA. VSS. Denies pain. Slept through the night. Plan to discharge today or tomorrow.

## 2019-11-30 NOTE — PLAN OF CARE
Pt A/O x 4, VSS, pt denies pain, headache, dizziness, n/v & SOB. Pt up Asst: 1 w/gait belt & walker. Lung sounds diminished/crackles, RA. Tele: Afib CVR, w/PVCs. PO Lasix. BLE edema +2. Skin tear on left arm, new dressing applied. Possible discharge back to prior living arrangement today. Will continue with plan of care.    Heart Failure Care Pathway  GOALS TO BE MET BEFORE DISCHARGE:    1. Decrease congestion and/or edema with diuretic therapy to achieve near      optimal volume status.            Dyspnea improved:  Yes            Edema improved:     Yes        Net I/O and Weights since admission:          10/31 1500 - 11/30 1459  In: 2800 [P.O.:2800]  Out: 9535 [Urine:9535]  Net: -6735            Vitals:    11/25/19 2129 11/26/19 0500 11/27/19 0618 11/28/19 0546   Weight: 96.6 kg (213 lb) 95.7 kg (211 lb) 94.3 kg (208 lb) 95 kg (209 lb 7 oz)    11/29/19 0655 11/30/19 0416   Weight: 94.3 kg (207 lb 12.8 oz) 93.3 kg (205 lb 9.6 oz)       2.  O2 sats > 92% on RA or at prior home O2 therapy level.          Current oxygenation status:       SpO2: 95 %         O2 Device: None (Room air),            Able to wean O2 this shift to keep sats > 92%:  Yes       Does patient use Home O2? No    3.  Tolerates ambulation and mobility near baseline: Yes        How many times did the patient ambulate with nursing staff this shift? 2    Please review the Heart Failure Care Pathway for additional HF goal outcomes.    Brittaney Reis, RN RN  11/30/2019

## 2019-11-30 NOTE — PLAN OF CARE
Pt A/Ox4, up with assist of 1 gb, walker. Pt denies pain, N/V, SOB, lightheadedness, and dizziness. Pt ambulated 100' this shift, slightly ANN and pain to lt knee and hip - per pt r/t old hip surgery. IV SL. IV lasix changed to PO - voiding spontaneously. VSS. TELE A. Fib CVR, PVCs. Cardiology following. Plan to discharge in one day. Continue with POC.    Heart Failure Care Pathway  GOALS TO BE MET BEFORE DISCHARGE:    1. Decrease congestion and/or edema with diuretic therapy to achieve near      optimal volume status.            Dyspnea improved:  Yes            Edema improved:     Yes improving.        Net I/O and Weights since admission:          10/30 2300 - 11/29 2259  In: 2560 [P.O.:2560]  Out: 7960 [Urine:7960]  Net: -5400            Vitals:    11/25/19 2129 11/26/19 0500 11/27/19 0618 11/28/19 0546   Weight: 96.6 kg (213 lb) 95.7 kg (211 lb) 94.3 kg (208 lb) 95 kg (209 lb 7 oz)    11/29/19 0655   Weight: 94.3 kg (207 lb 12.8 oz)       2.  O2 sats > 92% on RA or at prior home O2 therapy level.          Current oxygenation status:       SpO2: 95 %         O2 Device: None (Room air),            Able to wean O2 this shift to keep sats > 92%:  Yes       Does patient use Home O2? No    3.  Tolerates ambulation and mobility near baseline: Yes        How many times did the patient ambulate with nursing staff this shift? 1    Please review the Heart Failure Care Pathway for additional HF goal outcomes.    Ellen Westbrook, RN RN  11/29/2019

## 2019-11-30 NOTE — PROGRESS NOTES
D: BONNIE is following to coordinate discharge to [facility]. Pt has an order to return to the [facility] today.   A: BONNIE called the [facility] and spoke to Erika. Per Erika the nurse Brittaney has already called them and the ALBA will  pt at noon. Erika inquired about home care. BONNIE inquired if pt was getting home care services prior to admission, as MD had not ordered that. Pt was not getting home care services but they were in the process of setting it up when pt was admitted to [facility]. Erika would like pt to have RN, PT, OT; the [facility] uses [facility] BONNIE called [facility] [phone] and sent the referral to [phone].   BONNIE sent the updated discharge order to [phone].  P: Pt to discharge to [facility] today with [facility] at 1200.     RADHA Davenport, [facility]  Casual    [facility]  [phone]

## 2019-12-01 NOTE — PROGRESS NOTES
Transition Communication Hand-off for Care Transitions to Next Level of Care Provider    Name: Lopez Mcdermott  : 1929  MRN #: [id number]  Primary Care Provider: Michelle Gary  Primary Care MD Name: Michelle Gary  Primary Clinic: [facility] [address]  [facility] [address]  [facility]  Reason for Hospitalization:  Pleural effusion [J90]  Elevated troponin [R79.89]  Other ascites [R18.8]  Atrial fibrillation, unspecified type (H) [I48.91]  New onset of congestive heart failure (H) [I50.9]  Admit Date/Time: 2019  5:01 PM  Discharge Date: 2019  Payor Source: Payor: MEDICARE / Plan: MEDICARE / Product Type: Medicare /     Reason for Communication Hand-off Referral: Admission diagnoses: CHF    Any outstanding tests or procedures:        Referrals     Future Labs/Procedures    Home care nursing referral     Comments:    RN skilled nursing visit. RN to assess vital signs and weight, respiratory and cardiac status, patients ability to take and record daily blood pressure, temp and weight, hydration, nutrition and bowel status and home safety.    Your provider has ordered home care nursing services. If you have not been contacted within 2 days of your discharge please call the inpatient department phone number at [phone] .    Home Care OT Referral for Hospital Discharge     Comments:    OT to eval and treat    Your provider has ordered home care - occupational therapy. If you have not been contacted within 2 days of your discharge please call the department phone number listed on the top of this document.    Home Care PT Referral for Hospital Discharge     Comments:    PT to eval and treat    Your provider has ordered home care - physical therapy. If you have not been contacted within 2 days of your discharge please call the department phone number listed on the top of this document.              Key Recommendations:  Pt was admitted  for CHF exacerbation and abd pain. He has a known AAA. Abd US was done to rule out need for paracentesis. [place] [address] on admission and troponin of 0.055. Jimenez was placed and IV lasix given. His echo shows 45-50%. PT/OT will assess for discharge recommendations. Anticipate pt will return to [facility] with possible home care. He will need reinforcement of CHF education along with reinforcement to staff at his residence.    discharge recommendations are: home with homecare    Caren Andres RN    AVS/Discharge Summary is the source of truth; this is a helpful guide for improved communication of patient story

## 2019-12-12 ENCOUNTER — HOSPITAL ENCOUNTER (EMERGENCY)
Facility: CLINIC | Age: 84
Discharge: HOME | End: 2019-12-12
Attending: EMERGENCY MEDICINE | Admitting: EMERGENCY MEDICINE
Payer: MEDICARE

## 2019-12-12 VITALS
DIASTOLIC BLOOD PRESSURE: 90 MMHG | WEIGHT: 195 LBS | OXYGEN SATURATION: 98 % | RESPIRATION RATE: 16 BRPM | BODY MASS INDEX: 27.98 KG/M2 | SYSTOLIC BLOOD PRESSURE: 137 MMHG | TEMPERATURE: 97.8 F | HEART RATE: 77 BPM

## 2019-12-12 DIAGNOSIS — T79.2XXA SEROMA DUE TO TRAUMA (H): ICD-10-CM

## 2019-12-12 DIAGNOSIS — Z51.89 ENCOUNTER FOR WOUND RE-CHECK: ICD-10-CM

## 2019-12-12 LAB — HGB BLD-MCNC: 10 G/DL (ref 13.3–17.7)

## 2019-12-12 PROCEDURE — 85018 HEMOGLOBIN: CPT | Performed by: EMERGENCY MEDICINE

## 2019-12-12 PROCEDURE — 10160 PNXR ASPIR ABSC HMTMA BULLA: CPT

## 2019-12-12 PROCEDURE — 36415 COLL VENOUS BLD VENIPUNCTURE: CPT | Performed by: EMERGENCY MEDICINE

## 2019-12-12 PROCEDURE — 99283 EMERGENCY DEPT VISIT LOW MDM: CPT | Mod: 25

## 2019-12-12 SDOH — HEALTH STABILITY: MENTAL HEALTH: HOW OFTEN DO YOU HAVE A DRINK CONTAINING ALCOHOL?: NEVER

## 2019-12-12 ASSESSMENT — ENCOUNTER SYMPTOMS
DIZZINESS: 0
LIGHT-HEADEDNESS: 0
HEADACHES: 0
WOUND: 1

## 2019-12-12 NOTE — ED AVS SNAPSHOT
[facility] Emergency Department  [address]  [facility] [address]  Phone:  [phone]  Fax:  [phone]                                    Lopez Mcdermott   MRN: [id number]    Department:  [facility] Emergency Department   Date of Visit:  12/12/2019           After Visit Summary Signature Page    I have received my discharge instructions, and my questions have been answered. I have discussed any challenges I see with this plan with the nurse or doctor.    ..........................................................................................................................................  Patient/Patient Representative Signature      ..........................................................................................................................................  Patient Representative Print Name and Relationship to Patient    ..................................................               ................................................  Date                                   Time    ..........................................................................................................................................  Reviewed by Signature/Title    ...................................................              ..............................................  Date                                               Time          22EPIC Rev 08/18

## 2019-12-12 NOTE — ED TRIAGE NOTES
Pt picked the scab off of scalp laceration which happened on November 10th, has been bleeding since 4 pm seen by ems , dressing applied by EMS bleeding controlled, ABC intact alert and oriented

## 2019-12-12 NOTE — ED PROVIDER NOTES
History     Chief Complaint:  Head Laceration    HPI  Lopez Mcdermott is a 89 year old male who presents to the emergency department today for evaluation of a head laceration. The patient was seen here for a head laceration on 11/17 which was closed with 5 staples. The scab recently came off when the patient touched the area with his hand. Since 1600 yesterday afternoon the area has been bleeding. Staff cleaned and bandaged the wound area 3 times overnight. At 0400 this morning, the bleeding became uncontrollable. Staff states he has been soaking through pillows with the bleeding. EMS was called and they re-bandaged the area prior to transporting him here. The patient has no other complaints at this time.      Allergies:  No known drug allergies    Medications:    aspirin (ASA) 81 MG EC tablet  doxazosin (CARDURA) 4 MG tablet  folic acid (FOLVITE) 1 MG tablet  furosemide (LASIX) 40 MG tablet  metoprolol succinate ER (TOPROL-XL) 25 MG 24 hr tablet  pantoprazole (PROTONIX) 40 MG EC tablet  sodium chloride (NEBUSAL) 3 % neb solution    Past Medical History:    Atrial fibrillation  BPH  CKD, stage 2  Essential hypertension  GI bleed  Iron deficiency anemia  Severe aortic stenosis  Subdural hematoma   CHF     Past Surgical History:    History reviewed. No pertinent surgical history.    Family History:    History reviewed. No pertinent family history.     Social History:  The patient reports that he has quit smoking. He does not have any smokeless tobacco history on file. He reports that he does not drink alcohol.   PCP: Michelle Gary  Marital Status: Single    Review of Systems   Skin: Positive for wound.   Neurological: Negative for dizziness, light-headedness and headaches.   All other systems reviewed and are negative.      Physical Exam   First Vitals:  BP: (!) 157/92  Heart Rate: 92  Temp: 97.8  F (36.6  C)  Resp: 18  Weight: 88.5 kg (195 lb)  SpO2: 95 %    Physical Exam  General: The patient is alert, in no  respiratory distress.    HENT: Mucous membranes moist.     Cardiovascular: Regular rate and rhythm. Good pulses in all four extremities. Normal capillary refill and skin turgor.     Respiratory: Lungs are clear. No nasal flaring. No retractions. No wheezing, no crackles.    Gastrointestinal: Abdomen soft. No guarding, no rebound. No palpable hernias.     Musculoskeletal: No gross deformity.     Skin: No rashes or petechiae.     Neurologic: The patient is alert and oriented x3. GCS 15. No testable cranial nerve deficit. Follows commands with clear and appropriate speech. Gives appropriate answers. Good strength in all extremities. No gross neurologic deficit. Gross sensation intact. Pupils are round and reactive. No meningismus.     Lymphatic: No cervical adenopathy. No lower extremity swelling.    Psychiatric: The patient is non-tearful.    Emergency Department Course     Laboratory:  Laboratory findings were communicated with the patient who voiced understanding of the findings.    Hemoglobin: 10.0 (L)    Emergency Department Course:  Past medical records, nursing notes, and vitals reviewed.  0730: I performed an exam of the patient and obtained history, as documented above. GCS 15.    IV inserted and blood drawn.    0855: I rechecked the patient after the scalp wound was cleaned.     0900: I discussed the case with the nurse for Dr. Pathak of surgery regarding the patient due to my concern for a post-traumatic seroma.     0914: Attempted to aspirate with a large-bore needle with no return.     0916: I rechecked the patient. Findings and plan explained to the Patient. Patient discharged home with instructions regarding supportive care, medications, and reasons to return. The importance of close follow-up was reviewed.     Impression & Plan      Medical Decision Making:  Lopez Mcdermott is a 89 year old male who presents after picking a scab from a previous stapled head wound.  The wound had evidently been bleeding  quite a bit.  Due to the report of significant soaking of bed material I did check a hemoglobin which is not significantly changed.  The patient otherwise does not have new symptoms.  I reviewed the note there were no deeper sutures and this does not appear to be a stitch abscess.  There is a area on the superior scalp about the size of a chocolate chip that appears to be fluid-filled and thin-walled.  I did discuss case with general surgery who felt that seroma is likely as well.  I did attempt an aspiration however it appears that the blood that has clotted off there is deftly no abscess however.  No signs of active bleeding and is otherwise stable and was discharged home in good condition.    Diagnosis:    ICD-10-CM   1. Seroma due to trauma (H) T79.2XXA   2. Encounter for wound re-check Z51.89       Disposition:   Discharged.      Scribe Disclosure:  I, Diamond Chester, am serving as a scribe at 7:42 AM on 12/12/2019 to document services personally performed by Jason Jones MD based on my observations and the provider's statements to me.    [facility] EMERGENCY DEPARTMENT       Jason Jones MD  12/12/19 2293

## 2020-01-10 ENCOUNTER — COMMUNICATION - HEALTHEAST (OUTPATIENT)
Dept: ADMINISTRATIVE | Facility: HOSPITAL | Age: 85
End: 2020-01-10

## 2021-05-26 NOTE — TELEPHONE ENCOUNTER
Medication Question or Clarification  Who is calling: Other: Maggie from [place]  What medication are you calling about? (include dose and sig)   ferrous sulfate 325 (65 FE) MG tablet  Sig - Route: Take 1 tablet by mouth 2 (two) times a day. - Oral  Who prescribed the medication?: Listed as historical provider  What is your question/concern?: Does patient need to take iron twice a day or not, please clarify. If so, patient will need a new prescription.  Pharmacy: Patient has a new pharmacy Maggie will clarify  Okay to leave a detailed message?: Yes [phone]  [facility] - Please call the pharmacy to obtain any additional needed information.    Order Providers   Documenting Provider Encounter Provider   PROVIDER, Aurora Cummings, RN   Outpatient Medication Detail    Disp Refills Start End    ferrous sulfate 325 (65 FE) MG tablet        Sig - Route: Take 1 tablet by mouth 2 (two) times a day. - Oral    Class: Historical [facility]    Pharmacy   [facility] [address] [place] [address] - [facility] [address] [facility]     Who is calling: Other: Maggie from [place]  What medication are you calling about? (include dose and sig)   ferrous sulfate 325 (65 FE) MG tablet  Sig - Route: Take 1 tablet by mouth 2 (two) times a day. - Oral  Who prescribed the medication?: Hector Huber PA-C  What is your question/concern?: Can patient's Pericolace be changed to PRN. Patient only taking if he is constipated, please advise  Pharmacy: Patient has a new pharmacy Maggie will clarify  Okay to leave a detailed message?: Yes [phone]  [facility] - Please call the pharmacy to obtain any additional needed information.  Order Providers   Prescribing Provider Encounter Provider   Hector Huber PA-C None   Outpatient Medication Detail    Disp Refills Start End    senna-docusate (PERICOLACE) 8.6-50 mg tablet  0 1/28/2019     Sig - Route: Take 1 tablet by mouth 2 (two) times a day. - Oral    Class: OTC     Associated Diagnoses   Closed displaced intertrochanteric fracture of left femur, initial encounter (H)  - Primary       Pharmacy   [facility] [place] - [address]

## 2021-05-26 NOTE — TELEPHONE ENCOUNTER
Orders being requested: Skilled nursing visits once a week for 3 weeks. Home health aid once a week for bathing assistance.  Reason service is needed/diagnosis: Pain management, medication adherence and falls risk prevention.  When are orders needed by: As soon as possible  Where to send Orders: Phone:  [phone]  Okay to leave detailed message?  Yes [phone]

## 2021-05-27 NOTE — PROGRESS NOTES
Lopez is here today for ongoing management of anemia. Today is a 6 week f/u with labs, OV with Michael Malik NP. Maite Escobar

## 2021-05-27 NOTE — TELEPHONE ENCOUNTER
I give verbal order for services as requested.  Pulse rate less than 50 consistently would be a reason for him to be seen in clinic.  Rates in the low 50s are acceptable.

## 2021-05-27 NOTE — PATIENT INSTRUCTIONS - HE
Recent Results (from the past 24 hour(s))   HM2 (CBC W/O DIFF)   Result Value Ref Range    WBC 6.3 4.0 - 11.0 thou/uL    RBC 3.32 (L) 4.40 - 6.20 mill/uL    Hemoglobin 9.7 (L) 14.0 - 18.0 g/dL    Hematocrit 31.5 (L) 40.0 - 54.0 %    MCV 95 80 - 100 fL    MCH 29.2 27.0 - 34.0 pg    MCHC 30.8 (L) 32.0 - 36.0 g/dL    RDW 18.2 (H) 11.0 - 14.5 %    Platelets 158 140 - 440 thou/uL    MPV 8.9 8.5 - 12.5 fL

## 2021-05-27 NOTE — PROGRESS NOTES
ASSESSMENT:  1. Subdural hematoma (H)  Sustained January of this year conservative management has resolved.  Perhaps a slight decrease in memory but no other sequelae.    2. Benign prostatic hyperplasia  Chronic and long-standing he self caths    3. Chronic atrial fibrillation (H)  Patient is not a candidate for anticoagulation because of falls and anemia    4. Severe aortic stenosis  Recent diagnosis, any potential intervention is on hold.    5. Iron deficiency anemia due to chronic blood loss  Followed by hematology        PLAN:  1.  The patient is currently in senior living and overall doing well.  2.  Patient has future labs after April 2 he will then see hematology again.  3.  There was discussion of a possible TVAR procedure for his aortic stenosis but this is on hold  4.  Follow up in 3-4 months see earlier as needed.    No orders of the defined types were placed in this encounter.    There are no discontinued medications.   I did review the current medications and reconciled.    Return in about 3 months (around 6/26/2019) for Recheck.    CHIEF COMPLAINT:  Chief Complaint   Patient presents with     Follow-up     from being in patient and from being in TCU for L hip fracture and subdural hematoma      Paperwork     needs form signed        SUBJECTIVE:  Lopez is a 89 y.o. male who presents for an inpatient follow-up. Patient fell and suffered a left hip fracture and subdural hematoma. He explains that he had an UTI and when he stood up from his chair he blacked out and fell. He was in the hospital from 1/26/19-2/01/19 and in the TCU from 2/01/19-3/20/19. He is now at a senior living facility and plans to stay there permanently. He gets occasional urinary tract infections which have resulted in him falling three times. He notes that he is in good spirits. He is able to get around at his home with a walker. His son states that the patient has very little memory decline. He has a history of low hemoglobin and is  "able to tell when he has low levels. He has not felt like he has had low hemoglobin lately.     REVIEW OF SYSTEMS:   All other systems are negative.    PFSH:  Immunization History   Administered Date(s) Administered     Influenza high dose, seasonal 2016, 10/25/2017, 2018     Influenza, Seasonal, Inj PF IIV3 10/01/2010     Influenza,seasonal, Inj IIV3 2012, 10/05/2013     Pneumo Conj 13-V (2010&after) 2018     Pneumo Polysac 23-V 2007     Tdap 2017     Tetanus Toxoid, Adsorbed 2008     Social History     Socioeconomic History     Marital status:      Spouse name: Not on file     Number of children: 2     Years of education: Not on file     Highest education level: Not on file   Occupational History     Occupation: brick layer     Employer: RETIRED   Social Needs     Financial resource strain: Not on file     Food insecurity:     Worry: Not on file     Inability: Not on file     Transportation needs:     Medical: Not on file     Non-medical: Not on file   Tobacco Use     Smoking status: Former Smoker     Packs/day: 2.00     Types: Cigarettes     Last attempt to quit: 1987     Years since quittin.8     Smokeless tobacco: Never Used   Substance and Sexual Activity     Alcohol use: No     Comment: \"not much in the last few years\"     Drug use: No     Sexual activity: Not Currently   Lifestyle     Physical activity:     Days per week: Not on file     Minutes per session: Not on file     Stress: Not on file   Relationships     Social connections:     Talks on phone: Not on file     Gets together: Not on file     Attends Anabaptism service: Not on file     Active member of club or organization: Not on file     Attends meetings of clubs or organizations: Not on file     Relationship status: Not on file     Intimate partner violence:     Fear of current or ex partner: Not on file     Emotionally abused: Not on file     Physically abused: Not on file     Forced sexual " activity: Not on file   Other Topics Concern     Not on file   Social History Narrative    Diet-        Exercise-        Lives with Son-now Senior Living        Wife  from Hepatitis B     History reviewed. No pertinent past medical history.  Family History   Problem Relation Age of Onset     Heart disease Father      Arthritis Son      Asthma Son      Asthma Son        MEDICATIONS:  Current Outpatient Medications   Medication Sig Dispense Refill     acetaminophen (TYLENOL) 500 MG tablet Take 2 tablets (1,000 mg total) by mouth 3 (three) times a day. (Patient taking differently: Take 1,000 mg by mouth every 6 (six) hours as needed.       )  0     doxazosin (CARDURA) 4 MG tablet Take 1 tablet (4 mg total) by mouth daily. 90 tablet 3     ferrous sulfate 325 (65 FE) MG tablet Take 1 tablet (325 mg total) by mouth 2 (two) times a day. 180 tablet 1     fluticasone (FLONASE) 50 mcg/actuation nasal spray 1 spray into each nostril daily. 16 g 5     metoprolol tartrate (LOPRESSOR) 25 MG tablet Take 1 tablet (25 mg total) by mouth daily. 90 tablet 3     multivitamin therapeutic tablet Take 1 tablet by mouth daily.       pantoprazole (PROTONIX) 40 MG tablet Take 1 tablet (40 mg total) by mouth daily. 90 tablet 3     tamsulosin (FLOMAX) 0.4 mg cap Take 0.4 mg by mouth.       levETIRAcetam (KEPPRA) 500 MG tablet Take 1 tablet (500 mg total) by mouth 2 (two) times a day. 60 tablet 1     senna-docusate (PERICOLACE) 8.6-50 mg tablet Take 1 tablet by mouth 2 (two) times a day.  0     No current facility-administered medications for this visit.        TOBACCO USE:  Social History     Tobacco Use   Smoking Status Former Smoker     Packs/day: 2.00     Types: Cigarettes     Last attempt to quit: 1987     Years since quittin.8   Smokeless Tobacco Never Used       VITALS:  Vitals:    19 1140   BP: 91/54   Pulse: (!) 52   SpO2: 98%     Wt Readings from Last 3 Encounters:   19 172 lb (78 kg)   19 168 lb (76.2  kg)   03/12/19 169 lb (76.7 kg)       PHYSICAL EXAM:  Constitutional:   Reveals a healthy appearing male.  Vitals: per nursing notes.  Musculoskeletal: No peripheral swelling.  Neuro:  Alert and oriented. Cranial nerves, motor, sensory exams are intact.  No gross focal deficits.  Psychiatric:  Memory intact, mood appropriate.    QUALITY MEASURES:      DATA REVIEWED:  Additional History from Old Records Summarized (2): Reviewed discharge summary from 2/01/19: Subdural hematoma, left hip fracture.   Decision to Obtain Records (1):   Radiology Tests Summarized or Ordered (1):   Labs Reviewed or Ordered (1): Reviewed labs from 2/19/19: Hemoglobin: 9.6.   Medicine Test Summarized or Ordered (1):   Independent Review of EKG, X-RAY, or RAPID STREP (2 each):     The visit lasted a total of 23 minutes face to face with the patient. Over 50% of the time was spent counseling and educating the patient about his above concerns.    By signing my name below, I, Immanuel Ward, attest that this documentation has been prepared under the direction and in the presence of Dr. Inocente Colby.  Electronic Signature: Brunilda Alvarado. 3/26/2019 11:43 AM.    I, Dr. Colby, personally performed the services described in this documentation. All medical record entries made by the scribe were at my direction and in my presence. I have reviewed the chart and discharge instructions (if applicable) and agree that the record reflects my personal performance and is accurate and complete.      Total data points: 3

## 2021-05-27 NOTE — TELEPHONE ENCOUNTER
Orders being requested: Extended skilled nursing needed - Requesting one more visit a month in addition to the current order.  Reason service is needed/diagnosis: Recent hospitalization from fall producing a subdermal hematoma and fracture of the left hip.  When are orders needed by: as soon as possible  Where to send Orders: Verbal Orders requested  Okay to leave detailed message?  Yes

## 2021-05-27 NOTE — TELEPHONE ENCOUNTER
Orders being requested: occupational therapy    2 time per week for 4 weeks  2 times per month for 1 month    Reason service is needed/diagnosis: ADL's and fall prevention following his stay in TCU    When are orders needed by: ASAP  Where to send Orders: Phone:  [phone]  Okay to leave detailed message?  Yes

## 2021-05-27 NOTE — TELEPHONE ENCOUNTER
Received message from Erika Daniels, [phone], stating she would like to get Lopez scheduled for the recommended iron infusions per Michael Malik NP. Attempted to call her back and left message for return call on VM. Maite Escobar

## 2021-05-27 NOTE — TELEPHONE ENCOUNTER
Who is calling:  Erika Daniels from patient's new assisted living home.   Reason for Call:  The pharmacy that patient will now be using is [facility]. Erika asking if the order for the ferrous sulfate can be sent to them instead of the [facility]. [facility] FAX: [phone] PH: [phone]  Date of last appointment with primary care: 11/9/18  Okay to leave a detailed message: Yes

## 2021-05-27 NOTE — PROGRESS NOTES
[facility] Hematology and Oncology Progress Note    Patient: Lopez Mcdermott  MRN: [id number]  Date of Service: 04/02/2019        Reason for Visit:    Scheduled follow-up.    Assessment and Plan:    1) Iron deficiency anemia     89 y.o.     History of Macdonald's esophagus, hiatal hernia, gastric polyps and extensive diverticulosis    Appears to be due to GI bleeding.      1998 - first episode due to gastric ulcers.       Multiple endoscopic procedures - no specific site of bleeding was found.    04/18/18 colonoscopy showed extensive diverticular lesionswith evidence of recent bleeding in at least one diverticulum.      05/02/18 colonoscopy showed black material in the colon but it was not clear to the endoscopist if it was melena or due to oral iron.     05/14/18 capsule endoscopy did not show any specific site of bleeding.        07/23 - 07/25/18 hospitalized with HgB @ 6.    Transfused 3 units pRBCs    Treated with IV Venofer 10 times.      09/19/18 - Hemoglobin increased to 12.1.      11/05 - 11/07/18 hospitalized with HgB @ 6.1.      Transfused with 4 units of packed RBCs - HgB increased to 8.3.      Blood work has consistently shown iron deficiency.      Peripheral blood smear has not shown any dysplastic cells.     Once he receives iron he does have active reticulocytosis.    No evidence of malabsorption.  Duodenal biopsies in the past did not show any evidence of celiac disease.      Patient does occasionally see red blood in the stool.    11/13/18 and 11/19/18 - last doses Feraheme.    01/26 - 02/01/19 hospitalized after a fall with a subdural hematoma - conservatively managed, traumatic comminuted intertrochanteric (L) hip fracture - s/p INTERNAL FIXATION (Left) 01/27.  DC to TCU.    03/20/19 DC from TCU to [facility], residence for senior gentlemen.    04/02/19:    CBC - Plts and WBC WNL.  HgB stable @ 9.7.    Iron studies - pending.     OFF oral iron supplementation.      VSS.  Weight increasing.   "\"Feels great\".     We will call the coordinator at his senior facility ([facility]), Erika Daniels @ [phone] with results of iron studies.  With his MCV @ 95, it doesn't look like he will be needing any iron replete.    From a hematology point of view, all we can do is try to keep him replete with iron and vitamins.      However, no doubt he will intermittently be acutely anemic requiring hospitalizations to support with blood transfusions.      It does not appear that he had his second opinion at the [facility] GI department to try and identify the site of bleeding and have it dealt with.  This appears to be episodic diverticular bleeding.  If the bleeding can be stopped it would improve his quality of life considerably.      Discussed iron-containing foods that he can incorporate into his diet.    Continue Cardiology follow-up for atrial fibrillation.      Continue daily folic acid and MVT                                                                           05/14/19 - 6 week follow up with HEME2 and reservation for transfusion.     ECOG Performance:     ECOG Performance Status: 2    Distress Assessment - no distress.        TT > 20 minutes face to face with > 50 % in counseling and coordination of care.    Michael Malik, CNP      CC: Inocente Colby MD  ____________________________________________    Interim History:    Mr. Lopez cMdermott presents accompanied by his son and Erika Daniels, owner of [facility], the elderly gentleman residence where is is now living.  Lopez says he \"feels great\".  He is yet receiving PT after the fall he had in late January resulting in a (L) hip fracture.  He denies any discomfort that an occasional Tylenol does not relieve.  He denies dizziness, chest pain or palpitations.  He has not noticed bleeding anywhere and he states his stools are brown.  He denies fevers or night sweats, chills, lumps or bumps anywhere, nausea or vomiting, abdominal pain, " "constipation or diarrhea, skin rash, numbness or tingling.  He states that he self caths about 4 times daily for the past 7 years.  His appetite is good and weight increasing.    Pain Status:    Currently in Pain: Yes    Review of Systems    Constitutional  Constitutional (WDL): All constitutional elements are within defined limits  Neurosensory  Neurosensory (WDL): Exceptions to WDL  Peripheral Motor Neuropathy: Asymptomatic, clinical or diagnostic observations only, intervention not indicated(LLE weak but improving)  Ataxia: Moderate symptoms, limiting instrumental ADL(in a wheelchair)  Confusion: Mild disorientation  Cardiovascular  Cardiovascular (WDL): Exceptions to WDL  Pulmonary  Respiratory (WDL): Within Defined Limits  Gastrointestinal  Gastrointestinal (WDL): All gastrointestinal elements are within defined limits  Genitourinary  Genitourinary (WDL): All genitourinary elements are within defined limits  Integumentary  Integumentary (WDL): All integumentary elements are within defined limits  Patient Coping  Patient Coping: Open/discussion  Distress Assessment     Accompanied by  Accompanied by: Family Member(and caregiver from patient's adult foster care home)    Past History:    No past medical history on file.      Past Surgical History:   Procedure Laterality Date     CERVICAL FUSION  1971, 1977    x2     COLONOSCOPY  05/31/2017     COLONOSCOPY  04/18/2018    Multiple diverticula. \"Evidence of recent bleeding from diverticular opening.\"      COLONOSCOPY  05/02/2018    Black material was found in the entire colon.       COLONOSCOPY W/ POLYPECTOMY  11/30/2004     ESOPHAGOGASTRODUODENOSCOPY  04/13/1998    Healed antral ulcers.      ESOPHAGOGASTRODUODENOSCOPY  02/13/1998    Hiatal hernia with reflux esophagitis and distal esophageal erosions, multiple antral ulcers.     ESOPHAGOGASTRODUODENOSCOPY  09/24/2002    Hiatal hernia with distal esophageal stricture and probable Macdonald's status post antral and " esophageal biopsy status post balloon dilation to 18 mm.     ESOPHAGOGASTRODUODENOSCOPY  05/31/2017     ESOPHAGOGASTRODUODENOSCOPY  10/24/2017     ESOPHAGOGASTRODUODENOSCOPY  04/17/2018     ESOPHAGOGASTRODUODENOSCOPY  05/02/2018     HIP PINNING Left 1/27/2019    Procedure: INTERNAL FIXATION, FRACTURE, TROCHANTERIC, HIP, USING PINS OR RODS, USING HANA TABLE;  Surgeon: Fran Chi MD;  Location: [facility];  Service: Orthopedics     INGUINAL HERNIA REPAIR Bilateral 08/08/2001    Right indirect and direct inguinal hernia, left direct inguinal hernia.      TRANSURETHRAL RESECTION OF PROSTATE  11/17/2006    Cystoscopy, KTP laser TURP.     TRANSURETHRAL RESECTION OF PROSTATE  10/21/2009    CYSTOSCOPY, TUR OF PROSTATE WITH KTP LASER - PROCEDURE: CYSTOSCOPY AND TRANSURETHRAL RESECTION OF PROSTATE WITH KTP LASER       Physical Exam:    Recent Vitals 4/2/2019   Weight 182 lbs 10 oz   BSA (m2) 2.01 m2   /63   Pulse 65   Temp 97.4   Temp src 1   SpO2 97   Some recent data might be hidden       GENERAL:   Pleasant.  Alert and oriented.  Comfortable.  In no distress.  Accompanied by son and owner of residence where he stays.    HEAD:    XIN.  EOMI.  No pallor.  No icterus.  No mucosal lesions.    LYMPH NODES:  No palpable cervical, axillary or inguinal lymphadenopathy.    CHEST:   Lungs clear.    CVS:    S1 and S2 heard.  Irregular rate and rhythm.  Possible systolic murmur.    ABDOMEN:   Soft.  Not tender or distended.  No palpable hepatomegaly or splenomegaly, masses or ascites.    EXTREMITIES:  Warm.  No peripheral edema.    SKIN:    No rash, bruising or purpura noted.    Lab Results:    Reviewed with patient, his son and Erika Daniels.    Recent Results (from the past 24 hour(s))   HM2 (CBC W/O DIFF)   Result Value Ref Range    WBC 6.3 4.0 - 11.0 thou/uL    RBC 3.32 (L) 4.40 - 6.20 mill/uL    Hemoglobin 9.7 (L) 14.0 - 18.0 g/dL    Hematocrit 31.5 (L) 40.0 - 54.0 %    MCV 95 80 - 100 fL    MCH 29.2  27.0 - 34.0 pg    MCHC 30.8 (L) 32.0 - 36.0 g/dL    RDW 18.2 (H) 11.0 - 14.5 %    Platelets 158 140 - 440 thou/uL    MPV 8.9 8.5 - 12.5 fL       Imaging:    No results found.

## 2021-05-27 NOTE — TELEPHONE ENCOUNTER
Orders being requested: Home Care  Physical Therapy    1 time a week for 1 week    2 times a week for 5 weeks    1 time a month for 1 month  Reason service is needed/diagnosis: to work on gait transfers and therapeutic exercises.   When are orders needed by: ASAP  Where to send Hmuk0at: Phone:  verbal orders to caller   Okay to leave detailed message?  Yes      Caller also mentioned patient has a Hx of bradycardia and last week his resting pulse of 53. Caller is asking if there is a limit where she should call in with concern for this patient.

## 2021-05-27 NOTE — TELEPHONE ENCOUNTER
Erika returned me call. She had no further questions regarding iron infusions. She was transferred to [facility] for assistance. Maite Escobar

## 2021-05-27 NOTE — TELEPHONE ENCOUNTER
I left a message for romel good at [facility] to call me back. Lopez will need 4 iron infusions usually scheduled 1 week apart if this works for transportation Michael Malik will put orders in and we can get pt scheduled. Awaiting call back and then will inform Michael Malik.

## 2021-05-28 NOTE — TELEPHONE ENCOUNTER
Orders being requested: 1 extra visit from Home Care nurse for discharge   Reason service is needed/diagnosis: Fracture of left hip, with hospitalization   When are orders needed by: ASAP   Where to send Orders: Phone:  [phone]  Okay to leave detailed message?  Yes

## 2021-05-28 NOTE — TELEPHONE ENCOUNTER
Refill Approved    Rx renewed per Medication Renewal Policy. Medication was last renewed on 4/22/19.    Alyssa Elmore, Care Connection Triage/Med Refill 5/20/2019     Requested Prescriptions   Pending Prescriptions Disp Refills     tamsulosin (FLOMAX) 0.4 mg cap [Pharmacy Med Name: TAMSULOSIN HCL 0.4 MG CAPSULE] 30 capsule 0     Sig: TAKE 0.4MG (1 CAPSULE) BY MOUTH ONCE DAILY FOR URINARY RETENTION.       Alfuzosin/Tamsulosin/Silodosin Refill Protocol  Passed - 5/18/2019 11:26 AM        Passed - PCP or prescribing provider visit in past 12 months       Last office visit with prescriber/PCP: 3/26/2019 Inocente Colby MD OR same dept: 3/26/2019 Inocente Colby MD OR same specialty: 3/26/2019 Inocente Colby MD  Last physical: Visit date not found Last MTM visit: Visit date not found   Next visit within 3 mo: Visit date not found  Next physical within 3 mo: Visit date not found  Prescriber OR PCP: Inocente Colby MD  Last diagnosis associated with med order: 1. Benign prostatic hyperplasia with urinary frequency  - tamsulosin (FLOMAX) 0.4 mg cap [Pharmacy Med Name: TAMSULOSIN HCL 0.4 MG CAPSULE]; TAKE 0.4MG (1 CAPSULE) BY MOUTH ONCE DAILY FOR URINARY RETENTION  Dispense: 30 capsule; Refill: 0    If protocol passes may refill for 12 months if within 3 months of last provider visit (or a total of 15 months).

## 2021-05-28 NOTE — PROGRESS NOTES
Pt here today for feraheme infusion. He tolerated infusion well. VSS. He left clinic ambulatory with friend.

## 2021-05-28 NOTE — TELEPHONE ENCOUNTER
Orders being requested: twice a week for 3 weeks, two additional home health aids to help with showers  Reason service is needed/diagnosis: prior hip fracture   When are orders needed by: as soon as possible   Where to send Orders: Phone:  [phone]  Okay to leave detailed message?  Yes

## 2021-05-28 NOTE — TELEPHONE ENCOUNTER
RN cannot approve Refill Request    RN can NOT refill this medication because ordering provider is historical. Last office visit: 3/26/2019 Inocente Colby MD Last Physical: Visit date not found Last MTM visit: Visit date not found Last visit same specialty: 3/26/2019 Inocente Colby MD.  Next visit within 3 mo: Visit date not found  Next physical within 3 mo: Visit date not found      Ramila Singh, [facility] Connection Triage/Med Refill 4/20/2019    Requested Prescriptions   Pending Prescriptions Disp Refills     tamsulosin (FLOMAX) 0.4 mg cap [Pharmacy Med Name: TAMSULOSIN HCL 0.4 MG CAPSULE] 30 capsule 0     Sig: TAKE 0.4MG (1 CAPSULE) BY MOUTH ONCE DAILY FOR URINARY RETENTION       Alfuzosin/Tamsulosin/Silodosin Refill Protocol  Passed - 4/18/2019  3:00 PM        Passed - PCP or prescribing provider visit in past 12 months       Last office visit with prescriber/PCP: 3/26/2019 Inocente Colby MD OR same dept: 3/26/2019 Inocente Colby MD OR same specialty: 3/26/2019 Inocente Colby MD  Last physical: Visit date not found Last MTM visit: Visit date not found   Next visit within 3 mo: Visit date not found  Next physical within 3 mo: Visit date not found  Prescriber OR PCP: Inocente Colby MD  Last diagnosis associated with med order: There are no diagnoses linked to this encounter.  If protocol passes may refill for 12 months if within 3 months of last provider visit (or a total of 15 months).

## 2021-05-29 NOTE — TELEPHONE ENCOUNTER
Called Erika with Michael's update as outlined below. She expressed understanding and thanks. Maite Escobar

## 2021-05-29 NOTE — PATIENT INSTRUCTIONS - HE
Recent Results (from the past 24 hour(s))   HM2 (CBC W/O DIFF)   Result Value Ref Range    WBC 6.7 4.0 - 11.0 thou/uL    RBC 3.46 (L) 4.40 - 6.20 mill/uL    Hemoglobin 10.6 (L) 14.0 - 18.0 g/dL    Hematocrit 33.8 (L) 40.0 - 54.0 %    MCV 98 80 - 100 fL    MCH 30.6 27.0 - 34.0 pg    MCHC 31.4 (L) 32.0 - 36.0 g/dL    RDW 15.8 (H) 11.0 - 14.5 %    Platelets 145 140 - 440 thou/uL    MPV 9.8 8.5 - 12.5 fL

## 2021-05-29 NOTE — PROGRESS NOTES
Lopez is here today for ongoing management of anemia. Today is a 6 week f/u with labs and OV with Michael Malik NP. Maite Escobar

## 2021-05-29 NOTE — TELEPHONE ENCOUNTER
----- Message from Michael Malik CNP sent at 5/24/2019  8:49 AM CDT -----  Andrew Sutton call Lopez's caregiver and let her know that iron studies show no iron infusions are needed.  Thnx.    ----- Message -----  From: Lab, Background User  Sent: 5/21/2019   2:15 PM  To: Michael Malik CNP

## 2021-05-29 NOTE — PROGRESS NOTES
[facility] Hematology and Oncology Progress Note    Patient: Lopez Mcdermott  MRN: [id number]  Date of Service: 05/21/2019        Reason for Visit:    Scheduled follow-up.    Assessment and Plan:    1) Iron deficiency anemia     89 y.o.     History of Macdonald's esophagus, hiatal hernia, gastric polyps and extensive diverticulosis    Appears to be due to GI bleeding.      1998 - first episode due to gastric ulcers.       Multiple endoscopic procedures - no specific site of bleeding was found.    04/18/18 colonoscopy showed extensive diverticular lesionswith evidence of recent bleeding in at least one diverticulum.      05/02/18 colonoscopy showed black material in the colon but it was not clear to the endoscopist if it was melena or due to oral iron.     05/14/18 capsule endoscopy did not show any specific site of bleeding.        07/23 - 07/25/18 hospitalized with HgB @ 6.    Transfused 3 units pRBCs    Treated with IV Venofer 10 times.      09/19/18 - Hemoglobin increased to 12.1.      11/05 - 11/07/18 hospitalized with HgB @ 6.1.      Transfused with 4 units of packed RBCs - HgB increased to 8.3.      Blood work has consistently shown iron deficiency.      Peripheral blood smear has not shown any dysplastic cells.     Once he receives iron he does have active reticulocytosis.    No evidence of malabsorption.  Duodenal biopsies in the past did not show any evidence of celiac disease.      Patient does occasionally see red blood in the stool.    11/13/18 and 11/19/18 - received 2 doses Feraheme.    01/26 - 02/01/19 hospitalized after a fall with a subdural hematoma - conservatively managed, traumatic comminuted intertrochanteric (L) hip fracture - s/p INTERNAL FIXATION (Left) 01/27.  DC to TCU.    03/20/19 DC from TCU to [facility], residence for senior gentlemen.    04/18 and 04/24/19 - received 2 doses Feraheme, 510 mg.    05/21/19:    CBC - Plts and WBC WNL.  HgB increased @ 10.6.    Iron studies -  Ferritin  "> 300.  Slightly low iron and transferrin sat.     Iron infusions when iron and transferrin sat are low AND ferritin low or at the low end of normal.    OFF oral iron supplementation.     Call the coordinator at his senior facility ([facility]), Erika Daniels @ [phone] when iron studies show infusions are needed.      VSS.  Weight increasing.  Excellent mood. Feels good.    From a hematology point of view, all we can do is try to keep him replete with iron and vitamins.      No doubt he will intermittently be acutely anemic requiring hospitalizations to support with blood transfusions.      This appears to be episodic diverticular bleeding.      I again stressed that if the bleeding could be stopped it would improve his quality of life considerably.      Patient is NOT interested at his age of a second opinion at the [facility] GI department to try and identify the site of bleeding and have it dealt with.      Discussed iron-containing foods that he can incorporate into his diet.    Continue Cardiology follow-up for atrial fibrillation.      Continue daily folic acid and MVT                                     07/03/19 - 6 week follow up with HEME2, ferritin and reservation for transfusion.     ECOG Performance:     ECOG Performance Status: 2    Distress Assessment - no distress.        TT > 20 minutes face to face with > 50 % in counseling and coordination of care.    Michael Malik, CNP      CC: Inocente Colby MD  ____________________________________________    Interim History:    Mr. Lopez Mcdermott presents accompanied by Erika Daniels, owner of [facility], the elderly gentleman residence where he has been living.  Lopez says he \"feels great\".  He is yet receiving PT after the fall he had in late January resulting in a (L) hip fracture.  He denies any discomfort that an occasional Tylenol does not relieve.  He denies dizziness, chest pain or palpitations.  He has not noticed bleeding " "and he states his stools are brown.  He denies fevers or night sweats, chills, lumps or bumps anywhere, nausea or vomiting, abdominal pain, constipation or diarrhea, skin rash, numbness or tingling.  He continues to self cath about 4 times daily for the past 7-8 years.  He was recently treated for a UTI.  His appetite is good and weight increasing.    Pain Status:    Currently in Pain: No/denies    Review of Systems    Constitutional  Constitutional (WDL): Exceptions to WDL  Fatigue: Fatigue relieved by rest  Neurosensory  Neurosensory (WDL): Exceptions to WDL  Ataxia: Moderate symptoms, limiting instrumental ADL(in a wheelchair today)  Confusion: Mild disorientation  Cardiovascular  Cardiovascular (WDL): All cardiovascular elements are within defined limits  Pulmonary  Respiratory (WDL): Within Defined Limits  Gastrointestinal  Gastrointestinal (WDL): Exceptions to WDL  Diarrhea: Increase of <4 stools per day over baseline, mild increase in ostomy output compared to baseline(this morning)  Genitourinary  Genitourinary (WDL): Exceptions to WDL(treated for UTI last week)  Integumentary  Integumentary (WDL): Exceptions to WDL(left cheek skin cancer removal)  Patient Coping  Patient Coping: Accepting  Distress Assessment     Accompanied by  Accompanied by: Caregiver    Past History:    No past medical history on file.      Past Surgical History:   Procedure Laterality Date     CERVICAL FUSION  1971, 1977    x2     COLONOSCOPY  05/31/2017     COLONOSCOPY  04/18/2018    Multiple diverticula. \"Evidence of recent bleeding from diverticular opening.\"      COLONOSCOPY  05/02/2018    Black material was found in the entire colon.       COLONOSCOPY W/ POLYPECTOMY  11/30/2004     ESOPHAGOGASTRODUODENOSCOPY  04/13/1998    Healed antral ulcers.      ESOPHAGOGASTRODUODENOSCOPY  02/13/1998    Hiatal hernia with reflux esophagitis and distal esophageal erosions, multiple antral ulcers.     ESOPHAGOGASTRODUODENOSCOPY  09/24/2002    " Hiatal hernia with distal esophageal stricture and probable Macdonald's status post antral and esophageal biopsy status post balloon dilation to 18 mm.     ESOPHAGOGASTRODUODENOSCOPY  05/31/2017     ESOPHAGOGASTRODUODENOSCOPY  10/24/2017     ESOPHAGOGASTRODUODENOSCOPY  04/17/2018     ESOPHAGOGASTRODUODENOSCOPY  05/02/2018     HIP PINNING Left 1/27/2019    Procedure: INTERNAL FIXATION, FRACTURE, TROCHANTERIC, HIP, USING PINS OR RODS, USING HANA TABLE;  Surgeon: Fran Chi MD;  Location: [facility];  Service: Orthopedics     INGUINAL HERNIA REPAIR Bilateral 08/08/2001    Right indirect and direct inguinal hernia, left direct inguinal hernia.      TRANSURETHRAL RESECTION OF PROSTATE  11/17/2006    Cystoscopy, KTP laser TURP.     TRANSURETHRAL RESECTION OF PROSTATE  10/21/2009    CYSTOSCOPY, TUR OF PROSTATE WITH KTP LASER - PROCEDURE: CYSTOSCOPY AND TRANSURETHRAL RESECTION OF PROSTATE WITH KTP LASER       Physical Exam:    Recent Vitals 5/21/2019   Weight 188 lbs 3 oz   BSA (m2) 2.04 m2   /76   Pulse 73   Temp 98.1   Temp src 1   SpO2 96   Some recent data might be hidden       GENERAL:   Pleasant.  Alert and oriented.  Comfortable.  In no distress.  Accompanied by the owner of the elderly gentleman residence where he stays.    HEAD:    XIN.  EOMI.  No pallor.  No icterus.  No mucosal lesions.    LYMPH NODES:  No palpable cervical, axillary or inguinal lymphadenopathy.    CHEST:   Lungs clear.    CVS:    S1 and S2 heard.  Irregular rate and rhythm.  Possible systolic murmur.    ABDOMEN:   Soft.  Not tender.  Not distended.  No palpable hepatomegaly or splenomegaly, masses or ascites.    EXTREMITIES:  Warm.  No peripheral edema.    SKIN:    No rash, bruising or purpura noted.    Lab Results:    Reviewed with patient and Erika Daniels.    Recent Results (from the past 24 hour(s))   HM2 (CBC W/O DIFF)   Result Value Ref Range    WBC 6.7 4.0 - 11.0 thou/uL    RBC 3.46 (L) 4.40 - 6.20 mill/uL     Hemoglobin 10.6 (L) 14.0 - 18.0 g/dL    Hematocrit 33.8 (L) 40.0 - 54.0 %    MCV 98 80 - 100 fL    MCH 30.6 27.0 - 34.0 pg    MCHC 31.4 (L) 32.0 - 36.0 g/dL    RDW 15.8 (H) 11.0 - 14.5 %    Platelets 145 140 - 440 thou/uL    MPV 9.8 8.5 - 12.5 fL       Imaging:    No results found.

## 2021-05-30 NOTE — TELEPHONE ENCOUNTER
Spoke with pt's son today regarding previous appt for LAAO consult  At this time they do not wish to pursue further workup or implant

## 2021-06-01 VITALS — BODY MASS INDEX: 26.66 KG/M2 | WEIGHT: 180 LBS | HEIGHT: 69 IN

## 2021-06-01 VITALS — BODY MASS INDEX: 24.16 KG/M2 | WEIGHT: 163.6 LBS

## 2021-06-01 VITALS — BODY MASS INDEX: 24.66 KG/M2 | WEIGHT: 167 LBS

## 2021-06-02 VITALS — HEIGHT: 68 IN | WEIGHT: 168 LBS | BODY MASS INDEX: 25.46 KG/M2

## 2021-06-02 VITALS — WEIGHT: 155 LBS | BODY MASS INDEX: 22.89 KG/M2

## 2021-06-02 VITALS — BODY MASS INDEX: 25.86 KG/M2 | HEIGHT: 69 IN | WEIGHT: 174.6 LBS

## 2021-06-02 VITALS — WEIGHT: 166 LBS | BODY MASS INDEX: 24.51 KG/M2

## 2021-06-02 VITALS — WEIGHT: 169 LBS | BODY MASS INDEX: 24.96 KG/M2

## 2021-06-02 VITALS — BODY MASS INDEX: 25.4 KG/M2 | WEIGHT: 172 LBS

## 2021-06-02 VITALS — HEIGHT: 68 IN | WEIGHT: 168.4 LBS | BODY MASS INDEX: 25.52 KG/M2

## 2021-06-02 VITALS — BODY MASS INDEX: 24.59 KG/M2 | HEIGHT: 69 IN | WEIGHT: 166 LBS

## 2021-06-02 VITALS — BODY MASS INDEX: 24.07 KG/M2 | WEIGHT: 163 LBS

## 2021-06-02 VITALS — WEIGHT: 172 LBS | BODY MASS INDEX: 26.07 KG/M2 | HEIGHT: 68 IN

## 2021-06-02 VITALS — BODY MASS INDEX: 24.81 KG/M2 | WEIGHT: 168 LBS

## 2021-06-02 VITALS — WEIGHT: 173 LBS | BODY MASS INDEX: 26.3 KG/M2

## 2021-06-02 VITALS — BODY MASS INDEX: 25.76 KG/M2 | WEIGHT: 170 LBS | HEIGHT: 68 IN

## 2021-06-02 VITALS — BODY MASS INDEX: 25.55 KG/M2 | WEIGHT: 173 LBS

## 2021-06-02 VITALS — BODY MASS INDEX: 24.51 KG/M2 | WEIGHT: 166 LBS

## 2021-06-02 VITALS — WEIGHT: 173 LBS | BODY MASS INDEX: 25.55 KG/M2

## 2021-06-02 VITALS — BODY MASS INDEX: 26.97 KG/M2 | WEIGHT: 182.6 LBS

## 2021-06-02 VITALS — BODY MASS INDEX: 25.59 KG/M2 | WEIGHT: 168.3 LBS

## 2021-06-02 VITALS — BODY MASS INDEX: 24.96 KG/M2 | WEIGHT: 169 LBS

## 2021-06-02 VITALS — WEIGHT: 166.6 LBS | BODY MASS INDEX: 25.33 KG/M2

## 2021-06-03 VITALS — BODY MASS INDEX: 27.79 KG/M2 | WEIGHT: 188.2 LBS

## 2021-06-09 ENCOUNTER — COMMUNICATION - HEALTHEAST (OUTPATIENT)
Dept: GERIATRICS | Facility: CLINIC | Age: 86
End: 2021-06-09

## 2021-06-16 NOTE — TELEPHONE ENCOUNTER
Telephone Encounter by Deborah Coleman RN at 2/20/2019 11:25 AM     Author: Deborah Coleman RN Service: -- Author Type: Registered Nurse    Filed: 2/20/2019 11:32 AM Encounter Date: 2/20/2019 Status: Signed    : Deborah Coleman RN (Registered Nurse)       Vania Nur MD  [facility] [place] [facility] On[facility] Support Pool             Please call and let [facility] know that the patient appears to be iron replete at this time.  No need for IV iron at this time.   Please asked him to continue with the oral iron tablets and multivitamin tablets daily.   Follow-up as planned in 6 weeks.     Vania Nur MD      Left message for [facility] nurse to return call. Deborah Coleman RN

## 2021-06-18 NOTE — LETTER
Letter by Homa Smyth NP at      Author: Homa Smyth NP Service: -- Author Type: --    Filed:  Encounter Date: 2/15/2019 Status: (Other)         Patient: Lopez Mcdermott   MR Number: [id number]   YOB: 1929   Date of Visit: 2/15/2019                 [facility]    DATE: 2/15/2019    NAME:  Lopez Mcdermott             :  1929  MRN: [id number]  CODE STATUS:  FULL CODE    VISIT TYPE: Problem Visit (urinary retention)     FACILITY:  [facility] [[id number]]       CHIEF COMPLAIN/REASON FOR VISIT:    Chief Complaint   Patient presents with   ? Problem Visit     urinary retention               HISTORY OF PRESENT ILLNESS: Lopez Mcdermott is a 89 y.o. male who was admitted - for fall and found to have subdural hematoma on CT and Left comminuted intertrochanteric hip fracture. Neurosurgery was consulted and recommended conservative management. He was monitored in ICU and moved to general floor. His blood pressure was optimized <150. Neurology was consulted for possible seizure activity and recommended keppra. He underwent ORIF of Left hip fracture on . He was noted to have mod to severe AS and cardiology was consulted and recommended outpatient eval for TAVR procedure. He was also treated for UTI and completed 7 days of keflex. He was transferred to [facility] for further rehab. He has PMH of moderate to severe AS, BPH, anemia, CKD stage 3, A fib, gupta esophagus. Prior to this he lived at home with his son and daughter in law.     Today Mr. Mcdermott is seen for follow up on urinary retention. His garcia was removed yesterday and he has been able to void some. The nurse this morning had not yet bladder scanned him so unsure how much he is retaining. On exam Mr. Mcdermott now states he had been straight cathing himself at home before. He has never had a permanent catheter but would do this a couple times per day. He says right before he went to  the hospital he was up to doing this 4 times per day. He would like to go back on this schedule like he was doing before. He would like to be able to do it himself but admits he is not very steady on his feet yet. He denies any pain or burning when he does go a little on his own. He says he did have a rough night and had a lot of hip pain. It seems a little better this morning and has been doing fine in therapy. He denies other concerns today. Per chart review he is going on 2/19 to hematology for iron infusion and follow up on anemia.     REVIEW OF SYSTEMS:  PROBLEMS AND REVIEW OF SYSTEMS:   Review of Systems  Today on ROS:   Currently, no fever, chills, or rigors. Decreased vision and hearing. Denies any chest pain, palpitations, lightheadedness, dizziness. Appetite is good. Denies any GERD symptoms. Denies any difficulty with swallowing, nausea, or vomiting.  Denies any abdominal pain, diarrhea or constipation. No active bleeding. No rash. Positive for left hip incisions, left hip pain, no visual changes, headaches, shortness of breath with exertion, weakness, EZ stand for transfers, swelling in left leg, numbness left leg, urinary retention      No Known Allergies  Current Outpatient Medications   Medication Sig   ? acetaminophen (TYLENOL) 500 MG tablet Take 2 tablets (1,000 mg total) by mouth 3 (three) times a day.   ? doxazosin (CARDURA) 4 MG tablet Take 1 tablet (4 mg total) by mouth daily.   ? ferrous sulfate 325 (65 FE) MG tablet Take 1 tablet by mouth 2 (two) times a day.   ? fluticasone (FLONASE) 50 mcg/actuation nasal spray 1 spray into each nostril daily.   ? levETIRAcetam (KEPPRA) 500 MG tablet Take 1 tablet (500 mg total) by mouth 2 (two) times a day.   ? metoprolol tartrate (LOPRESSOR) 25 MG tablet Take 1 tablet (25 mg total) by mouth daily.   ? multivitamin therapeutic tablet Take 1 tablet by mouth daily.   ? oxyCODONE (ROXICODONE) 5 MG immediate release tablet Take 0.5-1 tablets (2.5-5 mg total) by  mouth every 4 (four) hours as needed.   ? pantoprazole (PROTONIX) 40 MG tablet Take 1 tablet (40 mg total) by mouth daily.   ? senna-docusate (PERICOLACE) 8.6-50 mg tablet Take 1 tablet by mouth 2 (two) times a day.   ? tamsulosin (FLOMAX) 0.4 mg cap Take 0.4 mg by mouth.     Past Medical History:    No past medical history on file.        PHYSICAL EXAMINATION  Vitals:    02/14/19 2018   BP: 150/79   Pulse: 95   Resp: 16   Temp: (!) 96.1  F (35.6  C)   SpO2: 96%   Weight: 155 lb (70.3 kg)       Today on physical exam:     GENERAL: Awake, Alert, oriented x3, not in any form of acute distress, answers questions appropriately, follows simple commands, conversant  HEENT: Head is normocephalic with normal hair distribution. No evidence of trauma. Ears: No acute purulent discharge. Eyes: Conjunctivae pink with no scleral jaundice. Nose: Normal mucosa and septum. NECK: Supple with no cervical or supraclavicular lymphadenopathy. Trachea is midline. glasses  CHEST: No tenderness or deformity, no crepitus  LUNG: Clear to auscultation with good chest expansion. There are no crackles or wheezes, normal AP diameter.  BACK: No kyphosis of the thoracic spine. Symmetric, no curvature, ROM normal, no CVA tenderness, no spinal tenderness   CVS: irregularly irregular rhythm, systolic murmur, no rubs, gallops, or heaves,  2+ pulses symmetric in all extremities.  ABDOMEN: Rounded and soft, nontender to palpation, non distended, no masses, no organomegaly, good bowel sounds, no rebound or guarding, no peritoneal signs.   EXTREMITIES: 1+ LLE pedal edema, Left hip two incisions c/d/i, Left hip pain and tenderness with ROM  SKIN: Warm and dry, scattered ecchymosis, two small abrasions to Left forearm and elbow  NEUROLOGICAL: The patient is oriented to person, place and time but is forgetful at times. No movement disorder, no arm drift, visual changes, no facial droop, unilateral weakness, mild numbness and tingling in left leg.              LABS:   Recent Results (from the past 168 hour(s))   Hemoglobin   Result Value Ref Range    Hemoglobin 8.5 (L) 14.0 - 18.0 g/dL     Results for orders placed or performed in visit on 02/05/19   Basic Metabolic Panel   Result Value Ref Range    Sodium 138 136 - 145 mmol/L    Potassium 3.8 3.5 - 5.0 mmol/L    Chloride 104 98 - 107 mmol/L    CO2 26 22 - 31 mmol/L    Anion Gap, Calculation 8 5 - 18 mmol/L    Glucose 87 70 - 125 mg/dL    Calcium 8.7 8.5 - 10.5 mg/dL    BUN 20 8 - 28 mg/dL    Creatinine 0.97 0.70 - 1.30 mg/dL    GFR MDRD Af Amer >60 >60 mL/min/1.73m2    GFR MDRD Non Af Amer >60 >60 mL/min/1.73m2         Lab Results   Component Value Date    WBC 9.3 02/05/2019    HGB 8.5 (L) 02/11/2019    HCT 29.8 (L) 02/05/2019    MCV 91 02/05/2019     02/05/2019       Lab Results   Component Value Date    [id number] 637 01/28/2019     No results found for: HGBA1C  Lab Results   Component Value Date    INR 1.18 (H) 01/26/2019    INR 1.27 (H) 07/23/2018    INR 1.21 (H) 05/16/2018     No results found for: [id number]  Lab Results   Component Value Date    TSH 3.19 01/28/2019           ASSESSMENT/PLAN:    1. Subdural hematoma, fall: No headaches, visual changes, no neuro deficits noted. Neuro checks daily. F/u with Neurosurgery in 2 weeks with head CT, scheduled for 2/11-apparently missed CT appt, still no appt on calendar today, again discussed with staff need for follow up. Keppra for seizure prophylaxis.   2. Left hip fracture, s/p ORIF: Incisions c/d/i, 1+ edema LLE. Pain controlled. Tylenol three times a day, oxycodone prn. F/u with ortho in 2 weeks. tevin hose. Ice prn. PT, OT. Needs f/u appt scheduled.   3. Moderate-severe AS: Was being evaluated for LAAO and TAVR procedure prior to hospital stay. Will need to f/u with cardiology once more stable.   4. BPH, Jimenez catheter: On doxazosin and added flomas. Jimenez catheter in place, appears was placed on 1/26 with no void trial inpatient. No h/o urinary  retention in medical records however pt now reporting that he had been straight cathing himself at home as needed but prior to hospital stay was up to 4 times per day. Failed void trial 2/7 and 2/14. Will schedule straight cath four times a day as doing at home and f/u with urology after discharge.   5. A fibrillation: Rate controlled in 70s. On metoprolol. hold parameters. No anticoagulants, antiplatelets until cleared per neurosurgery.   6. Macdonald's esophagus, h/o GI bleed: On pantoprazole. No current issues.   7. Iron deficiency anemia, acute blood loss anemia: Follows with hem/onc for weekly/biweekly hg levels, blood transfusions, iron infusions. On folic acid. Hg 8 on 1/29. 8.7 on 2/5. HG 8.5 on 2/11, stable. Appt with hematology and infusion clinic on 2/19.   8. CKD stage 3: Cr level 1.03 on 1/30. Cr 0.97 on 2/5. Stable.   9. UTI: completed keflex.   10. Allergic rhinitis: On flonase.   11. Constipation: On senna docusate two times a day.     Per therapy soft LCD 2/20 EZ stand assist of 1 for transfers, min/max for dressing, ambulates 10 feet in parallel bars with cga, slums 11.     Electronically signed by: Homa Smyth NP    Total 25 minutes of which 55% was spent in counseling and coordination of care of the above plan    Time spent in interview and examination of patient, review of available records, and discussion with nursing staff. Continue care plan, efforts at therapy, and monitor nutritional status.

## 2021-06-18 NOTE — LETTER
Letter by Homa Smyth NP at      Author: Homa Smyth NP Service: -- Author Type: --    Filed:  Encounter Date: 2019 Status: (Other)         Patient: Lopez Mcdermott   MR Number: [id number]   YOB: 1929   Date of Visit: 2019                 [facility]    DATE: 2019    NAME:  Lopez Mcdermott             :  1929  MRN: [id number]  CODE STATUS:  FULL CODE    VISIT TYPE: Problem Visit (urinary retention)     FACILITY:  [facility] [[id number]]       CHIEF COMPLAIN/REASON FOR VISIT:    Chief Complaint   Patient presents with   ? Problem Visit     urinary retention               HISTORY OF PRESENT ILLNESS: Lopez Mcdermott is a 89 y.o. male who was admitted - for fall and found to have subdural hematoma on CT and Left comminuted intertrochanteric hip fracture. Neurosurgery was consulted and recommended conservative management. He was monitored in ICU and moved to general floor. His blood pressure was optimized <150. Neurology was consulted for possible seizure activity and recommended keppra. He underwent ORIF of Left hip fracture on . He was noted to have mod to severe AS and cardiology was consulted and recommended outpatient eval for TAVR procedure. He was also treated for UTI and completed 7 days of keflex. He was transferred to [facility] for further rehab. He has PMH of moderate to severe AS, BPH, anemia, CKD stage 3, A fib, gupta esophagus. Prior to this he lived at home with his son and daughter in law.     Today Mr. Mcdermott is seen for follow up on urinary retention. His garcia was removed yesterday and staff report yesterday he had PVrs of 400 and 339. Then last evening he had difficulty voiding and bladder scan showed 800cc. Staff straight cathed him yesterday and this morning and each time was >250cc. He was complaining of abdominal pain yesterday before being straight cathed. On exam Mr. Mcdermott states he has no  issues voiding. He says he has been like this his entire life and that he is able to go without any issues. He says his appetite is good and bowels are moving. He is somewhat confused today. He is not able to recall the events of catheterizing overnight. Per staff still requiring EZ stand for transfers with nursing. Pain has appeared controlled.     REVIEW OF SYSTEMS:  PROBLEMS AND REVIEW OF SYSTEMS:   Review of Systems  Today on ROS:   Currently, no fever, chills, or rigors. Decreased vision and hearing. Denies any chest pain, palpitations, lightheadedness, dizziness. Appetite is good. Denies any GERD symptoms. Denies any difficulty with swallowing, nausea, or vomiting.  Denies any abdominal pain, diarrhea or constipation. No active bleeding. No rash. Positive for left hip incisions, left hip pain, no visual changes, headaches, shortness of breath with exertion, weakness, EZ stand for transfers, swelling in left leg, numbness left leg, urinary retention      No Known Allergies  Current Outpatient Medications   Medication Sig   ? tamsulosin (FLOMAX) 0.4 mg cap Take 0.4 mg by mouth.   ? acetaminophen (TYLENOL) 500 MG tablet Take 2 tablets (1,000 mg total) by mouth 3 (three) times a day.   ? cephalexin (KEFLEX) 500 MG capsule Take 1 capsule (500 mg total) by mouth 4 (four) times a day for 10 days.   ? doxazosin (CARDURA) 4 MG tablet Take 1 tablet (4 mg total) by mouth daily.   ? ferrous sulfate 325 (65 FE) MG tablet Take 1 tablet by mouth 2 (two) times a day.   ? fluticasone (FLONASE) 50 mcg/actuation nasal spray 1 spray into each nostril daily.   ? folic acid (FOLVITE) 1 MG tablet Take 1 tablet (1 mg total) by mouth daily.   ? levETIRAcetam (KEPPRA) 500 MG tablet Take 1 tablet (500 mg total) by mouth 2 (two) times a day.   ? metoprolol tartrate (LOPRESSOR) 25 MG tablet Take 1 tablet (25 mg total) by mouth daily.   ? multivitamin therapeutic tablet Take 1 tablet by mouth daily.   ? oxyCODONE (ROXICODONE) 5 MG  immediate release tablet Take 0.5-1 tablets (2.5-5 mg total) by mouth every 4 (four) hours as needed.   ? pantoprazole (PROTONIX) 40 MG tablet Take 1 tablet (40 mg total) by mouth daily.   ? senna-docusate (PERICOLACE) 8.6-50 mg tablet Take 1 tablet by mouth 2 (two) times a day.     Past Medical History:    No past medical history on file.        PHYSICAL EXAMINATION  Vitals:    02/07/19 1846   BP: 124/62   Pulse: 72   Resp: 16   Temp: 96.9  F (36.1  C)   SpO2: 97%   Weight: 173 lb (78.5 kg)       Today on physical exam:     GENERAL: Awake, Alert, oriented x3, not in any form of acute distress, answers questions appropriately, follows simple commands, conversant  HEENT: Head is normocephalic with normal hair distribution. No evidence of trauma. Ears: No acute purulent discharge. Eyes: Conjunctivae pink with no scleral jaundice. Nose: Normal mucosa and septum. NECK: Supple with no cervical or supraclavicular lymphadenopathy. Trachea is midline. glasses  CHEST: No tenderness or deformity, no crepitus  LUNG: Clear to auscultation with good chest expansion. There are no crackles or wheezes, normal AP diameter.  BACK: No kyphosis of the thoracic spine. Symmetric, no curvature, ROM normal, no CVA tenderness, no spinal tenderness   CVS: irregularly irregular rhythm, systolic murmur, no rubs, gallops, or heaves,  2+ pulses symmetric in all extremities.  ABDOMEN: Rounded and soft, nontender to palpation, non distended, no masses, no organomegaly, good bowel sounds, no rebound or guarding, no peritoneal signs.   EXTREMITIES: 1+ LLE pedal edema, Left hip two incisions c/d/i, Left hip pain and tenderness with ROM  SKIN: Warm and dry, scattered ecchymosis, two small abrasions to Left forearm and elbow  NEUROLOGICAL: The patient is oriented to person, place and time but is forgetful at times. No movement disorder, no arm drift, visual changes, no facial droop, unilateral weakness, mild numbness and tingling in left leg.              LABS:   Recent Results (from the past 168 hour(s))   POCT Glucose   Result Value Ref Range    Glucose 97 70 - 139 mg/dL   Basic Metabolic Panel   Result Value Ref Range    Sodium 138 136 - 145 mmol/L    Potassium 3.8 3.5 - 5.0 mmol/L    Chloride 104 98 - 107 mmol/L    CO2 26 22 - 31 mmol/L    Anion Gap, Calculation 8 5 - 18 mmol/L    Glucose 87 70 - 125 mg/dL    Calcium 8.7 8.5 - 10.5 mg/dL    BUN 20 8 - 28 mg/dL    Creatinine 0.97 0.70 - 1.30 mg/dL    GFR MDRD Af Amer >60 >60 mL/min/1.73m2    GFR MDRD Non Af Amer >60 >60 mL/min/1.73m2   HM1 (CBC with Diff)   Result Value Ref Range    WBC 9.3 4.0 - 11.0 thou/uL    RBC 3.28 (L) 4.40 - 6.20 mill/uL    Hemoglobin 8.7 (L) 14.0 - 18.0 g/dL    Hematocrit 29.8 (L) 40.0 - 54.0 %    MCV 91 80 - 100 fL    MCH 26.5 (L) 27.0 - 34.0 pg    MCHC 29.2 (L) 32.0 - 36.0 g/dL    RDW 19.1 (H) 11.0 - 14.5 %    Platelets 393 140 - 440 thou/uL    MPV 11.6 8.5 - 12.5 fL    Neutrophils % 74 (H) 50 - 70 %    Lymphocytes % 16 (L) 20 - 40 %    Monocytes % 7 2 - 10 %    Eosinophils % 2 0 - 6 %    Basophils % 0 0 - 2 %    Neutrophils Absolute 6.8 2.0 - 7.7 thou/uL    Lymphocytes Absolute 1.5 0.8 - 4.4 thou/uL    Monocytes Absolute 0.7 0.0 - 0.9 thou/uL    Eosinophils Absolute 0.2 0.0 - 0.4 thou/uL    Basophils Absolute 0.0 0.0 - 0.2 thou/uL   Hemoglobin   Result Value Ref Range    Hemoglobin 8.0 (L) 14.0 - 18.0 g/dL   Hemoglobin   Result Value Ref Range    Hemoglobin 7.6 (L) 14.0 - 18.0 g/dL     Results for orders placed or performed in visit on 02/05/19   Basic Metabolic Panel   Result Value Ref Range    Sodium 138 136 - 145 mmol/L    Potassium 3.8 3.5 - 5.0 mmol/L    Chloride 104 98 - 107 mmol/L    CO2 26 22 - 31 mmol/L    Anion Gap, Calculation 8 5 - 18 mmol/L    Glucose 87 70 - 125 mg/dL    Calcium 8.7 8.5 - 10.5 mg/dL    BUN 20 8 - 28 mg/dL    Creatinine 0.97 0.70 - 1.30 mg/dL    GFR MDRD Af Amer >60 >60 mL/min/1.73m2    GFR MDRD Non Af Amer >60 >60 mL/min/1.73m2         Lab  Results   Component Value Date    WBC 9.3 02/05/2019    HGB 7.6 (L) 02/07/2019    HCT 29.8 (L) 02/05/2019    MCV 91 02/05/2019     02/05/2019       Lab Results   Component Value Date    [id number] 637 01/28/2019     No results found for: HGBA1C  Lab Results   Component Value Date    INR 1.18 (H) 01/26/2019    INR 1.27 (H) 07/23/2018    INR 1.21 (H) 05/16/2018     No results found for: [id number]  Lab Results   Component Value Date    TSH 3.19 01/28/2019           ASSESSMENT/PLAN:    1. Subdural hematoma, fall: No headaches, visual changes, no neuro deficits noted. Neuro checks daily. F/u with Neurosurgery in 2 weeks with head CT, scheduled for 2/11. Keppra for seizure prophylaxis.   2. Left hip fracture, s/p ORIF: Incisions c/d/i, 1+ edema LLE. Pain controlled. Tylenol three times a day, oxycodone prn. F/u with ortho in 2 weeks. tevin hose. Ice prn. PT, OT.   3. Moderate-severe AS: Was being evaluated for LAAO and TAVR procedure prior to hospital stay. Will need to f/u with cardiology once more stable.   4. BPH, Garcia catheter: On doxazosin. Garcia catheter in place, appears was placed on 1/26 with no void trial inpatient. No h/o urinary retention. Removed on 2/7 and difficulty voiding, only able to void small amounts and pvr 400 and 339. Overnight straight cathed for scan>800cc. Reinsert garcia this morning. Will attempt void trial again on 2/14. Started flomax.   5. A fibrillation: Rate controlled in 70s. On metoprolol. Added hold parameters. No anticoagulants, antiplatelets until cleared per neurosurgery.   6. Macdonald's esophagus, h/o GI bleed: On pantoprazole. No current issues.   7. Iron deficiency anemia, acute blood loss anemia: Follows with hem/onc for weekly/biweekly hg levels, blood transfusions, iron infusions. On folic acid. Hg 8 on 1/29. 8.7 on 2/5. Recheck again one week.   8. CKD stage 3: Cr level 1.03 on 1/30. Cr 0.97 on 2/5. Stable.   9. UTI: On keflex x 10 days.   10. Allergic rhinitis: On  flonase.   11. Constipation: On senna docusate two times a day.     Per therapy soft LCD 2/20 EZ stand assist of 1 for transfers, min/max for dressing, ambulates 6 feet in parallel bars with cga, slums today.     Electronically signed by: Homa Smyth NP    Total 25 minutes of which 55% was spent in counseling and coordination of care of the above plan    Time spent in interview and examination of patient, review of available records, and discussion with nursing staff. Continue care plan, efforts at therapy, and monitor nutritional status.

## 2021-06-18 NOTE — LETTER
Letter by Homa Smyth NP at      Author: Homa Smyth NP Service: -- Author Type: --    Filed:  Encounter Date: 2019 Status: (Other)         Patient: Lopez Mcdermott   MR Number: [id number]   YOB: 1929   Date of Visit: 2019                 [facility]    DATE: 2019    NAME:  Lopez Mcdermott             :  1929  MRN: [id number]  CODE STATUS:  FULL CODE    VISIT TYPE: Problem Visit (garcia catheter, pain)     FACILITY:  [facility] [[id number]]       CHIEF COMPLAIN/REASON FOR VISIT:    Chief Complaint   Patient presents with   ? Problem Visit     garcia catheter, pain               HISTORY OF PRESENT ILLNESS: Lopez Mcdermott is a 89 y.o. male who was admitted - for fall and found to have subdural hematoma on CT and Left comminuted intertrochanteric hip fracture. Neurosurgery was consulted and recommended conservative management. He was monitored in ICU and moved to general floor. His blood pressure was optimized <150. Neurology was consulted for possible seizure activity and recommended keppra. He underwent ORIF of Left hip fracture on . He was noted to have mod to severe AS and cardiology was consulted and recommended outpatient eval for TAVR procedure. He was also treated for UTI and completed 7 days of keflex. He was transferred to [facility] for further rehab. He has PMH of moderate to severe AS, BPH, anemia, CKD stage 3, A fib, gupta esophagus. Prior to this he lived at home with his son and daughter in law.     Today Mr. Mcdermott states he had a rough night because of pain. He says he had a hard time getting into a comfortable position. He did take a pain pill and this helped some but he could use another one now. He says he did not sleep much last night because of the discomfort. He says otherwise therapy went ok yesterday and they are still using the lift thing to get him up but he thinks he is making some progress  and getting stronger. He ate pretty well yesterday and no nausea or stomach upset. He is agreeable to taking the garcia out later this week and having him try to pee on his own. He agrees he doesn't want this in any longer than he needs and understands the risk of infection.     REVIEW OF SYSTEMS:  PROBLEMS AND REVIEW OF SYSTEMS:   Review of Systems  Today on ROS:   Currently, no fever, chills, or rigors. Decreased vision and hearing. Denies any chest pain, palpitations, lightheadedness, dizziness. Appetite is good. Denies any GERD symptoms. Denies any difficulty with swallowing, nausea, or vomiting.  Denies any abdominal pain, diarrhea or constipation. No active bleeding. No rash. Positive for left hip incisions, left hip pain, no visual changes, headaches, shortness of breath with exertion, weakness, EZ stand for transfers, garcia catheter, swelling in left leg, numbness left leg, insomnia      No Known Allergies  Current Outpatient Medications   Medication Sig   ? acetaminophen (TYLENOL) 500 MG tablet Take 2 tablets (1,000 mg total) by mouth 3 (three) times a day.   ? cephalexin (KEFLEX) 500 MG capsule Take 1 capsule (500 mg total) by mouth 4 (four) times a day for 10 days.   ? doxazosin (CARDURA) 4 MG tablet Take 1 tablet (4 mg total) by mouth daily.   ? fluticasone (FLONASE) 50 mcg/actuation nasal spray 1 spray into each nostril daily.   ? folic acid (FOLVITE) 1 MG tablet Take 1 tablet (1 mg total) by mouth daily.   ? levETIRAcetam (KEPPRA) 500 MG tablet Take 1 tablet (500 mg total) by mouth 2 (two) times a day.   ? metoprolol tartrate (LOPRESSOR) 25 MG tablet Take 1 tablet (25 mg total) by mouth daily.   ? multivitamin therapeutic tablet Take 1 tablet by mouth daily.   ? oxyCODONE (ROXICODONE) 5 MG immediate release tablet Take 0.5-1 tablets (2.5-5 mg total) by mouth every 4 (four) hours as needed.   ? pantoprazole (PROTONIX) 40 MG tablet Take 1 tablet (40 mg total) by mouth daily.   ? senna-docusate  (PERICOLACE) 8.6-50 mg tablet Take 1 tablet by mouth 2 (two) times a day.     Past Medical History:    No past medical history on file.        PHYSICAL EXAMINATION  Vitals:    02/05/19 1000   BP: 142/66   Pulse: 98   Resp: 12   Temp: (!) 76  F (24.4  C)   SpO2: 96%   Weight: 173 lb (78.5 kg)       Today on physical exam:     GENERAL: Awake, Alert, oriented x3, not in any form of acute distress, answers questions appropriately, follows simple commands, conversant  HEENT: Head is normocephalic with normal hair distribution. No evidence of trauma. Ears: No acute purulent discharge. Eyes: Conjunctivae pink with no scleral jaundice. Nose: Normal mucosa and septum. NECK: Supple with no cervical or supraclavicular lymphadenopathy. Trachea is midline. glasses  CHEST: No tenderness or deformity, no crepitus  LUNG: Clear to auscultation with good chest expansion. There are no crackles or wheezes, normal AP diameter.  BACK: No kyphosis of the thoracic spine. Symmetric, no curvature, ROM normal, no CVA tenderness, no spinal tenderness   CVS: irregularly irregular rhythm, systolic murmur, no rubs, gallops, or heaves,  2+ pulses symmetric in all extremities.  ABDOMEN: Rounded and soft, nontender to palpation, non distended, no masses, no organomegaly, good bowel sounds, no rebound or guarding, no peritoneal signs.   EXTREMITIES: 1+ LLE pedal edema, Left hip two incisions c/d/i, Left hip pain and tenderness with ROM  SKIN: Warm and dry, scattered ecchymosis, two small abrasions to Left forearm and elbow  NEUROLOGICAL: The patient is oriented to person, place and time but is forgetful at times. No movement disorder, no arm drift, visual changes, no facial droop, unilateral weakness, mild numbness and tingling in left leg.             LABS:   Recent Results (from the past 168 hour(s))   POCT Glucose   Result Value Ref Range    Glucose 114 70 - 139 mg/dL   POCT Glucose   Result Value Ref Range    Glucose 114 70 - 139 mg/dL    Phosphorus   Result Value Ref Range    Phosphorus 2.4 (L) 2.5 - 4.5 mg/dL   Magnesium   Result Value Ref Range    Magnesium 2.0 1.8 - 2.6 mg/dL   Potassium - Next AM   Result Value Ref Range    Potassium 4.0 3.5 - 5.0 mmol/L   Creatinine   Result Value Ref Range    Creatinine 1.03 0.70 - 1.30 mg/dL    GFR MDRD Af Amer >60 >60 mL/min/1.73m2    GFR MDRD Non Af Amer >60 >60 mL/min/1.73m2   POCT Glucose   Result Value Ref Range    Glucose 101 70 - 139 mg/dL   Phosphorus   Result Value Ref Range    Phosphorus 2.7 2.5 - 4.5 mg/dL   Magnesium   Result Value Ref Range    Magnesium 2.1 1.8 - 2.6 mg/dL   Phosphorus   Result Value Ref Range    Phosphorus 2.9 2.5 - 4.5 mg/dL   Magnesium   Result Value Ref Range    Magnesium 2.1 1.8 - 2.6 mg/dL   Potassium   Result Value Ref Range    Potassium 4.1 3.5 - 5.0 mmol/L   POCT Glucose   Result Value Ref Range    Glucose 97 70 - 139 mg/dL     Results for orders placed or performed during the hospital encounter of 01/26/19   Basic Metabolic Panel   Result Value Ref Range    Sodium 142 136 - 145 mmol/L    Potassium 4.2 3.5 - 5.0 mmol/L    Chloride 114 (H) 98 - 107 mmol/L    CO2 18 (L) 22 - 31 mmol/L    Anion Gap, Calculation 10 5 - 18 mmol/L    Glucose 84 70 - 125 mg/dL    Calcium 8.0 (L) 8.5 - 10.5 mg/dL    BUN 28 8 - 28 mg/dL    Creatinine 1.08 0.70 - 1.30 mg/dL    GFR MDRD Af Amer >60 >60 mL/min/1.73m2    GFR MDRD Non Af Amer >60 >60 mL/min/1.73m2         Lab Results   Component Value Date    WBC 7.2 01/27/2019    HGB 8.0 (L) 01/29/2019    HCT 31.7 (L) 01/27/2019    MCV 85 01/27/2019     (L) 01/27/2019       Lab Results   Component Value Date    [id number] 637 01/28/2019     No results found for: HGBA1C  Lab Results   Component Value Date    INR 1.18 (H) 01/26/2019    INR 1.27 (H) 07/23/2018    INR 1.21 (H) 05/16/2018     No results found for: [id number]  Lab Results   Component Value Date    TSH 3.19 01/28/2019           ASSESSMENT/PLAN:    1. Subdural hematoma, fall: No  headaches, visual changes, no neuro deficits noted. Neuro checks daily. F/u with Neurosurgery in 2 weeks with head CT, scheduled for 2/11. Keppra for seizure prophylaxis.   2. Left hip fracture, s/p ORIF: Incisions c/d/i, 1+ edema LLE. Pain controlled. Tylenol three times a day, oxycodone prn. F/u with ortho in 2 weeks. tevin hose. Ice prn. PT, OT.   3. Moderate-severe AS: Was being evaluated for LAAO and TAVR procedure prior to hospital stay. Will need to f/u with cardiology once more stable.   4. BPH, Jimenez catheter: On doxazosin. Jimenez catheter in place, appears was placed on 1/26 with no void trial inpatient. No h/o urinary retention. Discussed with patient and will remove on 2/7 for void trial. PVR x 2, bladders scan q6h prn unable to void and straight cath if >250cc.   5. A fibrillation: Rate controlled in 70s. On metoprolol. Added hold parameters. No anticoagulants, antiplatelets until cleared per neurosurgery.   6. Macdonald's esophagus, h/o GI bleed: On pantoprazole. No current issues.   7. Iron deficiency anemia, acute blood loss anemia: Follows with hem/onc for weekly/biweekly hg levels, blood transfusions, iron infusions. On folic acid. Hg 8 on 1/29. 8.7 on 2/5. Recheck again one week.   8. CKD stage 3: Cr level 1.03 on 1/30. Cr 0.97 on 2/5. Stable.   9. UTI: On keflex x 10 days.   10. Allergic rhinitis: On flonase.   11. Constipation: On senna docusate two times a day.     Per therapy soft LCD 2/20 EZ stand assist of 1 for transfers, min/max for dressing, ambulates 6 feet in parallel bars with cga, slums today.     Electronically signed by: Homa Smyth NP    Total 25 minutes of which 55% was spent in counseling and coordination of care of the above plan    Time spent in interview and examination of patient, review of available records, and discussion with nursing staff. Continue care plan, efforts at therapy, and monitor nutritional status.

## 2021-06-18 NOTE — LETTER
Letter by Homa Smyth NP at      Author: Homa Smyth NP Service: -- Author Type: --    Filed:  Encounter Date: 2019 Status: (Other)         Patient: Lopez Mcdermott   MR Number: [id number]   YOB: 1929   Date of Visit: 2019                 [facility]    DATE: 2019    NAME:  Lopez Mcdermott             :  1929  MRN: [id number]  CODE STATUS:  FULL CODE    VISIT TYPE: Review Of Multiple Medical Conditions     FACILITY:  [facility] [[id number]]       CHIEF COMPLAIN/REASON FOR VISIT:    Chief Complaint   Patient presents with   ? Review Of Multiple Medical Conditions               HISTORY OF PRESENT ILLNESS: Lopez Mcdermott is a 89 y.o. male who was admitted - for fall and found to have subdural hematoma on CT and Left comminuted intertrochanteric hip fracture. Neurosurgery was consulted and recommended conservative management. He was monitored in ICU and moved to general floor. His blood pressure was optimized <150. Neurology was consulted for possible seizure activity and recommended keppra. He underwent ORIF of Left hip fracture on . He was noted to have mod to severe AS and cardiology was consulted and recommended outpatient eval for TAVR procedure. He was also treated for UTI and completed 7 days of keflex. He was transferred to [facility] for further rehab. He has PMH of moderate to severe AS, BPH, anemia, CKD stage 3, A fib, gupta esophagus. Prior to this he lived at home with his son and daughter in law.     Today Mr. Mcdermott is seen laying in bed this morning. He reports he needs to go to the bathroom because he filled both urinals during the night. He says he is still able to urinate some on his own and during the day they are still straight cathing him. He says he did see the blood doctor earlier this week but did not have an infusion that he can recall. He says he is not having any dizziness or  lightheadedness. He did have some pain in his hip overnight but feels it is doing ok overall. He says he sleeps well except that he has to urinate frequently. He says his appetite is good and therapy is going fine. He has no new concerns. Reviewed notes from hematology appt 2/19 and recommended iron two times a day. This had been stopped per his son's request but hg was improved this week at 9.6 so will restart. No iron infusion needed at this time as labs were stable. F/u again with hematology in April.    REVIEW OF SYSTEMS:  PROBLEMS AND REVIEW OF SYSTEMS:   Review of Systems  Today on ROS:   Currently, no fever, chills, or rigors. Decreased vision and hearing. Denies any chest pain, palpitations, lightheadedness, dizziness. Appetite is good. Denies any GERD symptoms. Denies any difficulty with swallowing, nausea, or vomiting.  Denies any abdominal pain, diarrhea or constipation. No active bleeding. No rash. Positive for left hip incisions, left hip pain, no visual changes, headaches, shortness of breath with exertion, swelling in left leg, numbness left leg, urinary retention-straight cath 4 times daily      No Known Allergies  Current Outpatient Medications   Medication Sig   ? acetaminophen (TYLENOL) 500 MG tablet Take 2 tablets (1,000 mg total) by mouth 3 (three) times a day. (Patient taking differently: Take 1,000 mg by mouth every 6 (six) hours as needed.       )   ? doxazosin (CARDURA) 4 MG tablet Take 1 tablet (4 mg total) by mouth daily.   ? ferrous sulfate 325 (65 FE) MG tablet Take 1 tablet by mouth 2 (two) times a day.   ? fluticasone (FLONASE) 50 mcg/actuation nasal spray 1 spray into each nostril daily.   ? levETIRAcetam (KEPPRA) 500 MG tablet Take 1 tablet (500 mg total) by mouth 2 (two) times a day.   ? metoprolol tartrate (LOPRESSOR) 25 MG tablet Take 1 tablet (25 mg total) by mouth daily.   ? multivitamin therapeutic tablet Take 1 tablet by mouth daily.   ? pantoprazole (PROTONIX) 40 MG tablet  Take 1 tablet (40 mg total) by mouth daily.   ? senna-docusate (PERICOLACE) 8.6-50 mg tablet Take 1 tablet by mouth 2 (two) times a day.   ? tamsulosin (FLOMAX) 0.4 mg cap Take 0.4 mg by mouth.     Past Medical History:    No past medical history on file.        PHYSICAL EXAMINATION  Vitals:    02/20/19 2015   BP: 114/64   Pulse: 68   Resp: 12   Temp: 96.7  F (35.9  C)   SpO2: 97%   Weight: 166 lb (75.3 kg)       Today on physical exam:     GENERAL: Awake, Alert, oriented x3, not in any form of acute distress, answers questions appropriately, follows simple commands, conversant  HEENT: Head is normocephalic with normal hair distribution. No evidence of trauma. Ears: No acute purulent discharge. Eyes: Conjunctivae pink with no scleral jaundice. Nose: Normal mucosa and septum. NECK: Supple with no cervical or supraclavicular lymphadenopathy. Trachea is midline. glasses  CHEST: No tenderness or deformity, no crepitus  LUNG: Clear to auscultation with good chest expansion. There are no crackles or wheezes, normal AP diameter.  BACK: No kyphosis of the thoracic spine. Symmetric, no curvature, ROM normal, no CVA tenderness, no spinal tenderness   CVS: irregularly irregular rhythm, systolic murmur, no rubs, gallops, or heaves,  2+ pulses symmetric in all extremities.  ABDOMEN: Rounded and soft, nontender to palpation, non distended, no masses, no organomegaly, good bowel sounds, no rebound or guarding, no peritoneal signs.   EXTREMITIES: trace LLE pedal edema, Left hip two incisions c/d/i, Left hip pain and tenderness with ROM  SKIN: Warm and dry,   NEUROLOGICAL: The patient is oriented to person, place and time but is forgetful at times. No movement disorder, no arm drift, visual changes, no facial droop, unilateral weakness, mild numbness and tingling in left leg.             LABS:   Recent Results (from the past 168 hour(s))   HM2 (CBC W/O DIFF)   Result Value Ref Range    WBC 9.1 4.0 - 11.0 thou/uL    RBC 3.48 (L) 4.40  - 6.20 mill/uL    Hemoglobin 9.6 (L) 14.0 - 18.0 g/dL    Hematocrit 31.9 (L) 40.0 - 54.0 %    MCV 92 80 - 100 fL    MCH 27.6 27.0 - 34.0 pg    MCHC 30.1 (L) 32.0 - 36.0 g/dL    RDW 19.6 (H) 11.0 - 14.5 %    Platelets 256 140 - 440 thou/uL    MPV 9.6 8.5 - 12.5 fL   Ferritin   Result Value Ref Range    Ferritin 93 27 - 300 ng/mL   Iron and Transferrin Iron Binding Capacity   Result Value Ref Range    Iron 40 (L) 42 - 175 ug/dL    Transferrin 227 212 - 360 mg/dL    Transferrin Saturation, Calculated 14 (L) 20 - 50 %    Transferrin IBC, Calculated 283 (L) 313 - 563 ug/dL     Results for orders placed or performed in visit on 02/05/19   Basic Metabolic Panel   Result Value Ref Range    Sodium 138 136 - 145 mmol/L    Potassium 3.8 3.5 - 5.0 mmol/L    Chloride 104 98 - 107 mmol/L    CO2 26 22 - 31 mmol/L    Anion Gap, Calculation 8 5 - 18 mmol/L    Glucose 87 70 - 125 mg/dL    Calcium 8.7 8.5 - 10.5 mg/dL    BUN 20 8 - 28 mg/dL    Creatinine 0.97 0.70 - 1.30 mg/dL    GFR MDRD Af Amer >60 >60 mL/min/1.73m2    GFR MDRD Non Af Amer >60 >60 mL/min/1.73m2         Lab Results   Component Value Date    WBC 9.1 02/19/2019    HGB 9.6 (L) 02/19/2019    HCT 31.9 (L) 02/19/2019    MCV 92 02/19/2019     02/19/2019       Lab Results   Component Value Date    [id number] 637 01/28/2019     No results found for: HGBA1C  Lab Results   Component Value Date    INR 1.18 (H) 01/26/2019    INR 1.27 (H) 07/23/2018    INR 1.21 (H) 05/16/2018     No results found for: [id number]  Lab Results   Component Value Date    TSH 3.19 01/28/2019           ASSESSMENT/PLAN:    1. Subdural hematoma, fall: No headaches, visual changes, no neuro deficits noted. Neuro checks daily. F/u with Neurosurgery in 2 weeks with head CT, scheduled for 2/11-apparently missed CT appt, now rescheduled for 3/1. Continue off aspirin and continue keppra for seizure prophylaxis until then.   2. Left hip fracture, s/p ORIF: Incisions c/d/i, 1+ edema LLE. Pain controlled.  Tylenol three times a day, oxycodone prn. F/u with ortho in 2 weeks-no appt scheduled, needs f/u appt. tevin hose. Ice prn. PT, OT. Needs f/u appt scheduled. Reviewed oxycodone and not used since 2/19. Will dc and change tylenol to prn only. Doing much better.   3. Moderate-severe AS: Was being evaluated for LAAO and TAVR procedure prior to hospital stay. Will need to f/u with cardiology once more stable.   4. BPH, Jimenez catheter: On doxazosin and added flomas. Jimenez catheter in place, appears was placed on 1/26 with no void trial inpatient. No h/o urinary retention in medical records however pt now reporting that he had been straight cathing himself at home as needed but prior to hospital stay was up to 4 times per day. Failed void trial 2/7 and 2/14. Scheduled straight cath four times a day as doing at home and f/u with urology after discharge. No recent concerns, staff assisting patient in doing.   5. A fibrillation: Rate controlled in 60s. On metoprolol. hold parameters. No anticoagulants, antiplatelets until cleared per neurosurgery.   6. Macdonald's esophagus, h/o GI bleed: On pantoprazole. No current issues.   7. Iron deficiency anemia, acute blood loss anemia: Follows with hem/onc for weekly/biweekly hg levels, blood transfusions, iron infusions. On folic acid. Hg 8 on 1/29. 8.7 on 2/5. HG 8.5 on 2/11, stable. Appt with hematology and infusion clinic on 2/19-iron and transferrin, ferritin stable no need for iv infusion of iron. Hg improved to 9.6. Recommended iron two times a day so this was restarted. F/u again on 4/2. Recommended gi follow up.   8. CKD stage 3: Cr level 1.03 on 1/30. Cr 0.97 on 2/5. Stable.   9. UTI: completed keflex.   10. Allergic rhinitis: On flonase.   11. Constipation: On senna docusate two times a day.     Per therapy soft LCD 3/4 CGA for toileting and dressing, ambulates 20-35 feet with cga, 11 on slums, 4.2 on cpt.     Electronically signed by: Homa Smyth NP    Time spent in  interview and examination of patient, review of available records, and discussion with nursing staff. Continue care plan, efforts at therapy, and monitor nutritional status.

## 2021-06-18 NOTE — LETTER
Letter by Homa Smyth NP at      Author: Homa Smyth NP Service: -- Author Type: --    Filed:  Encounter Date: 2019 Status: (Other)         Patient: Lopez Mcdermott   MR Number: [id number]   YOB: 1929   Date of Visit: 2019                 [facility]    DATE: 2019    NAME:  Lopez Mcdermott             :  1929  MRN: [id number]  CODE STATUS:  FULL CODE    VISIT TYPE: Problem Visit     FACILITY:  [facility] [[id number]]       CHIEF COMPLAIN/REASON FOR VISIT:    Chief Complaint   Patient presents with   ? Problem Visit               HISTORY OF PRESENT ILLNESS: Lopez Mcdermott is a 89 y.o. male who was admitted - for fall and found to have subdural hematoma on CT and Left comminuted intertrochanteric hip fracture. Neurosurgery was consulted and recommended conservative management. He was monitored in ICU and moved to general floor. His blood pressure was optimized <150. Neurology was consulted for possible seizure activity and recommended keppra. He underwent ORIF of Left hip fracture on . He was noted to have mod to severe AS and cardiology was consulted and recommended outpatient eval for TAVR procedure. He was also treated for UTI and completed 7 days of keflex. He was transferred to [facility] for further rehab. He has PMH of moderate to severe AS, BPH, anemia, CKD stage 3, A fib, gupta esophagus. Prior to this he lived at home with his son and daughter in law.     Today Mr. Mcdermott is seen for follow up on anemia and urinary retention. He reports yesterday he was in a lot of pain with therapy but it was controlled with the pain medicine. He slept fine overnight and has minimal pain today. His catheter is in place and denies any issues with it. He believes his bowels are moving fine and no shortness of breath or cough. He eats great when the food is great. He thinks he is doing well. His aid was in room during visit  and asked about bowel habits and no concerns. Aid says he has been doing fine. Nursing reports son noted to staff over weekend that he has been on oral iron in past and this did not work for him so he was on iron infusions and following with blood specialist.     REVIEW OF SYSTEMS:  PROBLEMS AND REVIEW OF SYSTEMS:   Review of Systems  Today on ROS:   Currently, no fever, chills, or rigors. Decreased vision and hearing. Denies any chest pain, palpitations, lightheadedness, dizziness. Appetite is good. Denies any GERD symptoms. Denies any difficulty with swallowing, nausea, or vomiting.  Denies any abdominal pain, diarrhea or constipation. No active bleeding. No rash. Positive for left hip incisions, left hip pain, no visual changes, headaches, shortness of breath with exertion, weakness, EZ stand for transfers, swelling in left leg, numbness left leg, urinary retention-garcia in place      No Known Allergies  Current Outpatient Medications   Medication Sig   ? acetaminophen (TYLENOL) 500 MG tablet Take 2 tablets (1,000 mg total) by mouth 3 (three) times a day.   ? cephalexin (KEFLEX) 500 MG capsule Take 1 capsule (500 mg total) by mouth 4 (four) times a day for 10 days.   ? doxazosin (CARDURA) 4 MG tablet Take 1 tablet (4 mg total) by mouth daily.   ? ferrous sulfate 325 (65 FE) MG tablet Take 1 tablet by mouth 2 (two) times a day.   ? fluticasone (FLONASE) 50 mcg/actuation nasal spray 1 spray into each nostril daily.   ? folic acid (FOLVITE) 1 MG tablet Take 1 tablet (1 mg total) by mouth daily.   ? levETIRAcetam (KEPPRA) 500 MG tablet Take 1 tablet (500 mg total) by mouth 2 (two) times a day.   ? metoprolol tartrate (LOPRESSOR) 25 MG tablet Take 1 tablet (25 mg total) by mouth daily.   ? multivitamin therapeutic tablet Take 1 tablet by mouth daily.   ? oxyCODONE (ROXICODONE) 5 MG immediate release tablet Take 0.5-1 tablets (2.5-5 mg total) by mouth every 4 (four) hours as needed.   ? pantoprazole (PROTONIX) 40 MG  tablet Take 1 tablet (40 mg total) by mouth daily.   ? senna-docusate (PERICOLACE) 8.6-50 mg tablet Take 1 tablet by mouth 2 (two) times a day.   ? tamsulosin (FLOMAX) 0.4 mg cap Take 0.4 mg by mouth.     Past Medical History:    No past medical history on file.        PHYSICAL EXAMINATION  Vitals:    02/11/19 2125   BP: 99/56   Pulse: 72   Resp: 18   Temp: (!) 96  F (35.6  C)   SpO2: 97%   Weight: 173 lb (78.5 kg)       Today on physical exam:     GENERAL: Awake, Alert, oriented x3, not in any form of acute distress, answers questions appropriately, follows simple commands, conversant  HEENT: Head is normocephalic with normal hair distribution. No evidence of trauma. Ears: No acute purulent discharge. Eyes: Conjunctivae pink with no scleral jaundice. Nose: Normal mucosa and septum. NECK: Supple with no cervical or supraclavicular lymphadenopathy. Trachea is midline. glasses  CHEST: No tenderness or deformity, no crepitus  LUNG: Clear to auscultation with good chest expansion. There are no crackles or wheezes, normal AP diameter.  BACK: No kyphosis of the thoracic spine. Symmetric, no curvature, ROM normal, no CVA tenderness, no spinal tenderness   CVS: irregularly irregular rhythm, systolic murmur, no rubs, gallops, or heaves,  2+ pulses symmetric in all extremities.  ABDOMEN: Rounded and soft, nontender to palpation, non distended, no masses, no organomegaly, good bowel sounds, no rebound or guarding, no peritoneal signs.   EXTREMITIES: 1+ LLE pedal edema, Left hip two incisions c/d/i, Left hip pain and tenderness with ROM  SKIN: Warm and dry, scattered ecchymosis, two small abrasions to Left forearm and elbow  NEUROLOGICAL: The patient is oriented to person, place and time but is forgetful at times. No movement disorder, no arm drift, visual changes, no facial droop, unilateral weakness, mild numbness and tingling in left leg.             LABS:   Recent Results (from the past 168 hour(s))   Basic Metabolic Panel    Result Value Ref Range    Sodium 138 136 - 145 mmol/L    Potassium 3.8 3.5 - 5.0 mmol/L    Chloride 104 98 - 107 mmol/L    CO2 26 22 - 31 mmol/L    Anion Gap, Calculation 8 5 - 18 mmol/L    Glucose 87 70 - 125 mg/dL    Calcium 8.7 8.5 - 10.5 mg/dL    BUN 20 8 - 28 mg/dL    Creatinine 0.97 0.70 - 1.30 mg/dL    GFR MDRD Af Amer >60 >60 mL/min/1.73m2    GFR MDRD Non Af Amer >60 >60 mL/min/1.73m2   HM1 (CBC with Diff)   Result Value Ref Range    WBC 9.3 4.0 - 11.0 thou/uL    RBC 3.28 (L) 4.40 - 6.20 mill/uL    Hemoglobin 8.7 (L) 14.0 - 18.0 g/dL    Hematocrit 29.8 (L) 40.0 - 54.0 %    MCV 91 80 - 100 fL    MCH 26.5 (L) 27.0 - 34.0 pg    MCHC 29.2 (L) 32.0 - 36.0 g/dL    RDW 19.1 (H) 11.0 - 14.5 %    Platelets 393 140 - 440 thou/uL    MPV 11.6 8.5 - 12.5 fL    Neutrophils % 74 (H) 50 - 70 %    Lymphocytes % 16 (L) 20 - 40 %    Monocytes % 7 2 - 10 %    Eosinophils % 2 0 - 6 %    Basophils % 0 0 - 2 %    Neutrophils Absolute 6.8 2.0 - 7.7 thou/uL    Lymphocytes Absolute 1.5 0.8 - 4.4 thou/uL    Monocytes Absolute 0.7 0.0 - 0.9 thou/uL    Eosinophils Absolute 0.2 0.0 - 0.4 thou/uL    Basophils Absolute 0.0 0.0 - 0.2 thou/uL   Hemoglobin   Result Value Ref Range    Hemoglobin 8.0 (L) 14.0 - 18.0 g/dL   Hemoglobin   Result Value Ref Range    Hemoglobin 7.6 (L) 14.0 - 18.0 g/dL   Hemoglobin   Result Value Ref Range    Hemoglobin 8.5 (L) 14.0 - 18.0 g/dL     Results for orders placed or performed in visit on 02/05/19   Basic Metabolic Panel   Result Value Ref Range    Sodium 138 136 - 145 mmol/L    Potassium 3.8 3.5 - 5.0 mmol/L    Chloride 104 98 - 107 mmol/L    CO2 26 22 - 31 mmol/L    Anion Gap, Calculation 8 5 - 18 mmol/L    Glucose 87 70 - 125 mg/dL    Calcium 8.7 8.5 - 10.5 mg/dL    BUN 20 8 - 28 mg/dL    Creatinine 0.97 0.70 - 1.30 mg/dL    GFR MDRD Af Amer >60 >60 mL/min/1.73m2    GFR MDRD Non Af Amer >60 >60 mL/min/1.73m2         Lab Results   Component Value Date    WBC 9.3 02/05/2019    HGB 8.5 (L) 02/11/2019     HCT 29.8 (L) 02/05/2019    MCV 91 02/05/2019     02/05/2019       Lab Results   Component Value Date    [id number] 637 01/28/2019     No results found for: HGBA1C  Lab Results   Component Value Date    INR 1.18 (H) 01/26/2019    INR 1.27 (H) 07/23/2018    INR 1.21 (H) 05/16/2018     No results found for: [id number]  Lab Results   Component Value Date    TSH 3.19 01/28/2019           ASSESSMENT/PLAN:    1. Subdural hematoma, fall: No headaches, visual changes, no neuro deficits noted. Neuro checks daily. F/u with Neurosurgery in 2 weeks with head CT, scheduled for 2/11-per review of records no notes from 2/11 but had CT ordered for 2/11 but no imaging report, unsure if patient missed appt?-asked staff to check into this and make sure has a f/u with neuro. Keppra for seizure prophylaxis.   2. Left hip fracture, s/p ORIF: Incisions c/d/i, 1+ edema LLE. Pain controlled. Tylenol three times a day, oxycodone prn. F/u with ortho in 2 weeks. tevin hose. Ice prn. PT, OT. No appt noted in appt book-reminded staff needs f/u scheduled.   3. Moderate-severe AS: Was being evaluated for LAAO and TAVR procedure prior to hospital stay. Will need to f/u with cardiology once more stable.   4. BPH, Jimenez catheter: On doxazosin. Jimenez catheter in place, appears was placed on 1/26 with no void trial inpatient. No h/o urinary retention. Removed on 2/7 and failed void trial. Will attempt void trial again on 2/14. On flomax. Per family report was straight cathing intermittently last few years at home. Due to debilitation may be unable to do that at this time but will attempt one more void trial as completed treatment for uti with straight cath prn. Will need to f/u with urology after discharge.   5. A fibrillation: Rate controlled in 70s. On metoprolol. Added hold parameters. No anticoagulants, antiplatelets until cleared per neurosurgery.   6. Macdonald's esophagus, h/o GI bleed: On pantoprazole. No current issues.   7. Iron  deficiency anemia, acute blood loss anemia: Follows with hem/onc for weekly/biweekly hg levels, blood transfusions, iron infusions. On folic acid. Hg 8 on 1/29. 8.7 on 2/5. HG 8.5 on 2/11, stable. Requested staff schedule f/u with hem/onc to resume iron infusions.   8. CKD stage 3: Cr level 1.03 on 1/30. Cr 0.97 on 2/5. Stable.   9. UTI: completed keflex.   10. Allergic rhinitis: On flonase.   11. Constipation: On senna docusate two times a day.     Per therapy soft LCD 2/20 EZ stand assist of 1 for transfers, min/max for dressing, ambulates 10 feet in parallel bars with cga, slums 11.     Electronically signed by: Homa Smyth NP    Total 25 minutes of which 55% was spent in counseling and coordination of care of the above plan    Time spent in interview and examination of patient, review of available records, and discussion with nursing staff. Continue care plan, efforts at therapy, and monitor nutritional status.

## 2021-06-18 NOTE — LETTER
Letter by Homa Smyth NP at      Author: Homa Smyth NP Service: -- Author Type: --    Filed:  Encounter Date: 2019 Status: (Other)         Patient: Lopez Mcdermott   MR Number: [id number]   YOB: 1929   Date of Visit: 2019                 [facility]    DATE: 2019    NAME:  Lopez Mcdermott             :  1929  MRN: [id number]  CODE STATUS:  FULL CODE    VISIT TYPE: Review Of Multiple Medical Conditions     FACILITY:  [facility] [[id number]]       CHIEF COMPLAIN/REASON FOR VISIT:    Chief Complaint   Patient presents with   ? Review Of Multiple Medical Conditions               HISTORY OF PRESENT ILLNESS: Lopez Mcdermott is a 89 y.o. male who was admitted - for fall and found to have subdural hematoma on CT and Left comminuted intertrochanteric hip fracture. Neurosurgery was consulted and recommended conservative management. He was monitored in ICU and moved to general floor. His blood pressure was optimized <150. Neurology was consulted for possible seizure activity and recommended keppra. He underwent ORIF of Left hip fracture on . He was noted to have mod to severe AS and cardiology was consulted and recommended outpatient eval for TAVR procedure. He was also treated for UTI and completed 7 days of keflex. He was transferred to [facility] for further rehab. He has PMH of moderate to severe AS, BPH, anemia, CKD stage 3, A fib, gupta esophagus. Prior to this he lived at home with his son and daughter in law.     Today Mr. Mcdermott is seen for review of systems today. He is noted to be standing at the door to the bathroom without an assistive device alone. His legs were shaking and was noted to be lowering self to the floor. He reports he had to go to the bathroom but his legs were not working. He says he cannot remember if he pushed the call button to ask for help. He says he needs some help. Assisted him to standing  position and obtained walker and staff to assist. Was able to lower into wheelchair and did not fall. Discussed with him importance of needing assistance when going to bathroom or getting up. Discussed needing evaluation for alarms with staff. He says he is not having any shortness of breath at this time or recently that he recalls. He does have pain in his hip this morning when standing. He is not sure if he has had an appt with ortho recently or has one coming up. He denies issues with bowels recently. Per staff he has not been noted to be getting out of bed on his previously that they are aware of.He has appt with nsgy on 3/1.     REVIEW OF SYSTEMS:  PROBLEMS AND REVIEW OF SYSTEMS:   Review of Systems  Today on ROS:   Currently, no fever, chills, or rigors. Decreased vision and hearing. Denies any chest pain, palpitations, lightheadedness, dizziness. Appetite is good. Denies any GERD symptoms. Denies any difficulty with swallowing, nausea, or vomiting.  Denies any abdominal pain, diarrhea or constipation. No active bleeding. No rash. Positive for left hip incisions, left hip pain, no visual changes, headaches, shortness of breath with exertion, swelling in left leg, numbness left leg, urinary retention-straight cath 4 times daily, up on own this morning near fall      No Known Allergies  Current Outpatient Medications   Medication Sig   ? acetaminophen (TYLENOL) 500 MG tablet Take 2 tablets (1,000 mg total) by mouth 3 (three) times a day. (Patient taking differently: Take 1,000 mg by mouth every 6 (six) hours as needed.       )   ? doxazosin (CARDURA) 4 MG tablet Take 1 tablet (4 mg total) by mouth daily.   ? ferrous sulfate 325 (65 FE) MG tablet Take 1 tablet by mouth 2 (two) times a day.   ? fluticasone (FLONASE) 50 mcg/actuation nasal spray 1 spray into each nostril daily.   ? levETIRAcetam (KEPPRA) 500 MG tablet Take 1 tablet (500 mg total) by mouth 2 (two) times a day.   ? metoprolol tartrate (LOPRESSOR) 25  MG tablet Take 1 tablet (25 mg total) by mouth daily.   ? multivitamin therapeutic tablet Take 1 tablet by mouth daily.   ? pantoprazole (PROTONIX) 40 MG tablet Take 1 tablet (40 mg total) by mouth daily.   ? senna-docusate (PERICOLACE) 8.6-50 mg tablet Take 1 tablet by mouth 2 (two) times a day.   ? tamsulosin (FLOMAX) 0.4 mg cap Take 0.4 mg by mouth.     Past Medical History:    No past medical history on file.        PHYSICAL EXAMINATION  Vitals:    02/25/19 2049   BP: 142/66   Pulse: 77   Resp: 12   Temp: 96.7  F (35.9  C)   SpO2: 98%   Weight: 166 lb (75.3 kg)       Today on physical exam:     GENERAL: Awake, Alert, oriented x3, not in any form of acute distress, answers questions appropriately, follows simple commands, conversant  HEENT: Head is normocephalic with normal hair distribution. No evidence of trauma. Ears: No acute purulent discharge. Eyes: Conjunctivae pink with no scleral jaundice. Nose: Normal mucosa and septum. NECK: Supple with no cervical or supraclavicular lymphadenopathy. Trachea is midline. glasses  CHEST: No tenderness or deformity, no crepitus  LUNG: Clear to auscultation with good chest expansion. There are no crackles or wheezes, normal AP diameter.  BACK: No kyphosis of the thoracic spine. Symmetric, no curvature, ROM normal, no CVA tenderness, no spinal tenderness   CVS: irregularly irregular rhythm, systolic murmur, no rubs, gallops, or heaves,  2+ pulses symmetric in all extremities.  ABDOMEN: Rounded and soft, nontender to palpation, non distended, no masses, no organomegaly, good bowel sounds, no rebound or guarding, no peritoneal signs.   EXTREMITIES: 1+ LLE pedal edema, Left hip two incisions c/d/i, Left hip pain and tenderness with ROM, trace rle edema  SKIN: Warm and dry,   NEUROLOGICAL: The patient is oriented to person, place and time but is forgetful at times. No movement disorder, no arm drift, visual changes, no facial droop, unilateral weakness, mild numbness and  tingling in left leg. Near fall this am            LABS:   Recent Results (from the past 168 hour(s))   HM2 (CBC W/O DIFF)   Result Value Ref Range    WBC 9.1 4.0 - 11.0 thou/uL    RBC 3.48 (L) 4.40 - 6.20 mill/uL    Hemoglobin 9.6 (L) 14.0 - 18.0 g/dL    Hematocrit 31.9 (L) 40.0 - 54.0 %    MCV 92 80 - 100 fL    MCH 27.6 27.0 - 34.0 pg    MCHC 30.1 (L) 32.0 - 36.0 g/dL    RDW 19.6 (H) 11.0 - 14.5 %    Platelets 256 140 - 440 thou/uL    MPV 9.6 8.5 - 12.5 fL   Ferritin   Result Value Ref Range    Ferritin 93 27 - 300 ng/mL   Iron and Transferrin Iron Binding Capacity   Result Value Ref Range    Iron 40 (L) 42 - 175 ug/dL    Transferrin 227 212 - 360 mg/dL    Transferrin Saturation, Calculated 14 (L) 20 - 50 %    Transferrin IBC, Calculated 283 (L) 313 - 563 ug/dL     Results for orders placed or performed in visit on 02/05/19   Basic Metabolic Panel   Result Value Ref Range    Sodium 138 136 - 145 mmol/L    Potassium 3.8 3.5 - 5.0 mmol/L    Chloride 104 98 - 107 mmol/L    CO2 26 22 - 31 mmol/L    Anion Gap, Calculation 8 5 - 18 mmol/L    Glucose 87 70 - 125 mg/dL    Calcium 8.7 8.5 - 10.5 mg/dL    BUN 20 8 - 28 mg/dL    Creatinine 0.97 0.70 - 1.30 mg/dL    GFR MDRD Af Amer >60 >60 mL/min/1.73m2    GFR MDRD Non Af Amer >60 >60 mL/min/1.73m2         Lab Results   Component Value Date    WBC 9.1 02/19/2019    HGB 9.6 (L) 02/19/2019    HCT 31.9 (L) 02/19/2019    MCV 92 02/19/2019     02/19/2019       Lab Results   Component Value Date    [id number] 637 01/28/2019     No results found for: HGBA1C  Lab Results   Component Value Date    INR 1.18 (H) 01/26/2019    INR 1.27 (H) 07/23/2018    INR 1.21 (H) 05/16/2018     No results found for: [id number]  Lab Results   Component Value Date    TSH 3.19 01/28/2019           ASSESSMENT/PLAN:    1. Subdural hematoma, fall: No headaches, visual changes, no neuro deficits noted. Neuro checks daily. F/u with Neurosurgery in 2 weeks with head CT, scheduled for 2/11-apparently  missed CT appt, now rescheduled for 3/1. Continue off aspirin and continue keppra for seizure prophylaxis until then. No recent neuro concerns. Did have near fall this am. Mental status appears at baseline though.   2. Left hip fracture, s/p ORIF: Incisions c/d/i, 1+ edema LLE. Pain controlled. Tylenol prn. F/u with ortho in 2 weeks-no appt scheduled, needs f/u appt. tevin hose. Ice prn. PT, OT.  Stopped oxycodone as pain improved.   3. Moderate-severe AS: Was being evaluated for LAAO and TAVR procedure prior to hospital stay. Will need to f/u with cardiology once more stable.   4. BPH, straight cath: On doxazosin and added flomax  straight cath four times a day as doing at home and f/u with urology after discharge. No recent concerns, staff assisting patient in doing.   5. A fibrillation: Rate controlled in 70s. On metoprolol. hold parameters. No anticoagulants, antiplatelets until cleared per neurosurgery.   6. Macdonald's esophagus, h/o GI bleed: On pantoprazole. No current issues.   7. Iron deficiency anemia, acute blood loss anemia: Follows with hem/onc for weekly/biweekly hg levels, blood transfusions, iron infusions. On folic acid. Hg 8 on 1/29. 8.7 on 2/5. HG 8.5 on 2/11, stable. Appt with hematology and infusion clinic on 2/19-iron and transferrin, ferritin stable no need for iv infusion of iron. Hg improved to 9.6. Recommended iron two times a day so this was restarted. F/u again on 4/2. Recommended gi follow up.   8. CKD stage 3: Cr level 1.03 on 1/30. Cr 0.97 on 2/5. Stable.   9. UTI: completed keflex.   10. Allergic rhinitis: On flonase.   11. Constipation: On senna docusate two times a day.   12. Near fall: Discussed with staff and may need alarms for safety. Staff do not report recent concerns with self transferring. Will monitor closely. No fall and no injury this morning.     Per therapy soft LCD 3/4 sbaA for toileting and dressing, ambulates 20-35 feet with cga, 11 on slums, 4.2 on cpt. HH PT, OT, HHA.      Electronically signed by: Homa Smyth NP    Time spent in interview and examination of patient, review of available records, and discussion with nursing staff. Continue care plan, efforts at therapy, and monitor nutritional status.

## 2021-06-18 NOTE — LETTER
Letter by Homa Smyth NP at      Author: Homa Smyth NP Service: -- Author Type: --    Filed:  Encounter Date: 3/12/2019 Status: (Other)         Patient: Lopez Mcdermott   MR Number: [id number]   YOB: 1929   Date of Visit: 3/12/2019                 [facility]    DATE: 3/12/2019    NAME:  Lopez Mcdermott             :  1929  MRN: [id number]  CODE STATUS:  FULL CODE    VISIT TYPE: Review Of Multiple Medical Conditions     FACILITY:  [facility] [[id number]]       CHIEF COMPLAIN/REASON FOR VISIT:    Chief Complaint   Patient presents with   ? Review Of Multiple Medical Conditions               HISTORY OF PRESENT ILLNESS: Lopez Mcdermott is a 89 y.o. male who was admitted - for fall and found to have subdural hematoma on CT and Left comminuted intertrochanteric hip fracture. Neurosurgery was consulted and recommended conservative management. He was monitored in ICU and moved to general floor. His blood pressure was optimized <150. Neurology was consulted for possible seizure activity and recommended keppra. He underwent ORIF of Left hip fracture on . He was noted to have mod to severe AS and cardiology was consulted and recommended outpatient eval for TAVR procedure. He was also treated for UTI and completed 7 days of keflex. He was transferred to [facility] for further rehab. He has PMH of moderate to severe AS, BPH, anemia, CKD stage 3, A fib, gupta esophagus. Prior to this he lived at home with his son and daughter in law.     Today Mr. Mcdermott is seen for routine follow up. He was supposed to be discharging today as he has completed therapy but  reports his family is looking at long term care placement options so he will remain here private pay in the meantime. On exam he reports no pain in his hip today and has been walking and doing well. He is disappointed that he is done with therapy as he feels he could still  use it. He hopes the staff will at least walk with him in the hallways or something to keep up his strength. He reports no issues with bowels or breathing lately. He is eating very well and no stomach problems. He doesn't know what the plan is for him now.     REVIEW OF SYSTEMS:  PROBLEMS AND REVIEW OF SYSTEMS:   Review of Systems  Today on ROS:   Currently, no fever, chills, or rigors. Decreased vision and hearing. Denies any chest pain, palpitations, lightheadedness, dizziness. Appetite is good. Denies any GERD symptoms. Denies any difficulty with swallowing, nausea, or vomiting.  Denies any abdominal pain, diarrhea or constipation. No active bleeding. No rash. Positive for left hip incisions healed, left hip pain minimal, no visual changes, headaches, no recent shortness of breath, no further swelling in left leg, no numbness left leg today, urinary retention-straight cath 4 times daily      No Known Allergies  Current Outpatient Medications   Medication Sig   ? acetaminophen (TYLENOL) 500 MG tablet Take 2 tablets (1,000 mg total) by mouth 3 (three) times a day. (Patient taking differently: Take 1,000 mg by mouth every 6 (six) hours as needed.       )   ? doxazosin (CARDURA) 4 MG tablet Take 1 tablet (4 mg total) by mouth daily.   ? ferrous sulfate 325 (65 FE) MG tablet Take 1 tablet by mouth 2 (two) times a day.   ? fluticasone (FLONASE) 50 mcg/actuation nasal spray 1 spray into each nostril daily.   ? levETIRAcetam (KEPPRA) 500 MG tablet Take 1 tablet (500 mg total) by mouth 2 (two) times a day.   ? metoprolol tartrate (LOPRESSOR) 25 MG tablet Take 1 tablet (25 mg total) by mouth daily.   ? multivitamin therapeutic tablet Take 1 tablet by mouth daily.   ? pantoprazole (PROTONIX) 40 MG tablet Take 1 tablet (40 mg total) by mouth daily.   ? senna-docusate (PERICOLACE) 8.6-50 mg tablet Take 1 tablet by mouth 2 (two) times a day.   ? tamsulosin (FLOMAX) 0.4 mg cap Take 0.4 mg by mouth.     Past Medical  History:    No past medical history on file.        PHYSICAL EXAMINATION  Vitals:    03/12/19 0700   BP: 157/66   Pulse: 64   Resp: 18   Temp: 97.8  F (36.6  C)   SpO2: 96%   Weight: 169 lb (76.7 kg)       Today on physical exam:     GENERAL: Awake, Alert, oriented x3, not in any form of acute distress, answers questions appropriately, follows simple commands, conversant  HEENT: Head is normocephalic with normal hair distribution. No evidence of trauma. Ears: No acute purulent discharge. Eyes: Conjunctivae pink with no scleral jaundice. Nose: Normal mucosa and septum. NECK: Supple with no cervical or supraclavicular lymphadenopathy. Trachea is midline. glasses  CHEST: No tenderness or deformity, no crepitus  LUNG: Clear to auscultation with good chest expansion. There are no crackles or wheezes, normal AP diameter.  BACK: No kyphosis of the thoracic spine. Symmetric, no curvature, ROM normal, no CVA tenderness, no spinal tenderness   CVS: irregularly irregular rhythm, systolic murmur, no rubs, gallops, or heaves,  2+ pulses symmetric in all extremities.  ABDOMEN: Rounded and soft, nontender to palpation, non distended, no masses, no organomegaly, good bowel sounds, no rebound or guarding, no peritoneal signs.   EXTREMITIES: no LLE pedal edema, Left hip two incisions healed, minimal Left hip pain and tenderness with ROM, no rle edema  SKIN: Warm and dry,   NEUROLOGICAL: The patient is oriented to person, place and time but is forgetful at times. No movement disorder, no arm drift, visual changes, no facial droop, unilateral weakness, no numbness and tingling in left leg today            LABS:   No results found for this or any previous visit (from the past 168 hour(s)).  Results for orders placed or performed in visit on 02/05/19   Basic Metabolic Panel   Result Value Ref Range    Sodium 138 136 - 145 mmol/L    Potassium 3.8 3.5 - 5.0 mmol/L    Chloride 104 98 - 107 mmol/L    CO2 26 22 - 31 mmol/L    Anion Gap,  Calculation 8 5 - 18 mmol/L    Glucose 87 70 - 125 mg/dL    Calcium 8.7 8.5 - 10.5 mg/dL    BUN 20 8 - 28 mg/dL    Creatinine 0.97 0.70 - 1.30 mg/dL    GFR MDRD Af Amer >60 >60 mL/min/1.73m2    GFR MDRD Non Af Amer >60 >60 mL/min/1.73m2         Lab Results   Component Value Date    WBC 9.1 02/19/2019    HGB 9.6 (L) 02/19/2019    HCT 31.9 (L) 02/19/2019    MCV 92 02/19/2019     02/19/2019       Lab Results   Component Value Date    [id number] 637 01/28/2019     No results found for: HGBA1C  Lab Results   Component Value Date    INR 1.18 (H) 01/26/2019    INR 1.27 (H) 07/23/2018    INR 1.21 (H) 05/16/2018     No results found for: [id number]  Lab Results   Component Value Date    TSH 3.19 01/28/2019           ASSESSMENT/PLAN:    1. Subdural hematoma, fall: No headaches, visual changes, no neuro deficits noted. Neuro checks daily. F/u with Neurosurgery 3/1-stopped keppra, increase activity as tolerated, avoid falls, f/u prn.  No recent neuro concerns. CT showed interval resolution of left frontoparietal low attenuation subdural hematoma, interval resolution of small amt of intraventricular hemorrhage left occipital horn, mod generalized volume loss and chronic ischemic changes.   2. Left hip fracture, s/p ORIF: Incisions c/d/i, 1+ edema LLE. F/u with ortho on 3/13. tevin hose. Ice prn. PT, OT.  Pain controlled tylenol prn    3. Moderate-severe AS: Was being evaluated for LAAO and TAVR procedure prior to hospital stay. Will need to f/u with cardiology once more stable.   4. BPH, straight cath: On doxazosin and added flomax  straight cath four times a day as doing at home and f/u with urology after discharge. No recent concerns, staff assisting patient in doing.   5. A fibrillation: Rate controlled in 60s. On metoprolol. hold parameters. No anticoagulants, antiplatelets until cleared per neurosurgery-reviewed 3/1 note and Ct showed interval resolution of hemorrhage. continue off antiplatelets, anticoagulants due  to anemia and recent SDH for now. F/u with cardiology.   6. Macdonald's esophagus, h/o GI bleed: On pantoprazole. No current issues.   7. Iron deficiency anemia, acute blood loss anemia: Follows with hem/onc for weekly/biweekly hg levels, blood transfusions, iron infusions. On folic acid. Hg 8 on 1/29. 8.7 on 2/5. HG 8.5 on 2/11, stable. Appt with hematology and infusion clinic on 2/19-iron and transferrin, ferritin stable no need for iv infusion of iron. Hg improved to 9.6. Recommended iron two times a day so this was restarted. F/u again on 4/2. Recommended gi follow up.   8. CKD stage 3: Cr level 1.03 on 1/30. Cr 0.97 on 2/5. Stable.   9. UTI: completed keflex.   10. Allergic rhinitis: On flonase.   11. Constipation: On senna docusate two times a day. BM nearly every day.   12. Cognitive impairment: Only scored 11/30 on slums and 4.2 on cpt. Speech therapy was working on cognition and now completed. Therapy recommending 24 hour supervision for safety. A/o but forgetful.     Per therapy firm LCD 3/11 sba for toileting and dressing, ambulates 75 feet with walker and cga, 11 on slums, 4.2 on cpt. Private pay now awaiting long term care placement.     Electronically signed by: Homa Smyth NP

## 2021-06-18 NOTE — LETTER
Letter by Homa Smyth NP at      Author: Homa Smyth NP Service: -- Author Type: --    Filed:  Encounter Date: 2019 Status: (Other)         Patient: Lopez Mcdermott   MR Number: [id number]   YOB: 1929   Date of Visit: 2019                 [facility]    DATE: 2019    NAME:  Lopez Mcdermott             :  1929  MRN: [id number]  CODE STATUS:  FULL CODE    VISIT TYPE: Problem Visit (hospital f/u)     FACILITY:  [facility] [[id number]]       CHIEF COMPLAIN/REASON FOR VISIT:    Chief Complaint   Patient presents with   ? Problem Visit     hospital f/               HISTORY OF PRESENT ILLNESS: Lopez Mcdermott is a 89 y.o. male who was admitted - for fall and found to have subdural hematoma on CT and Left comminuted intertrochanteric hip fracture. Neurosurgery was consulted and recommended conservative management. He was monitored in ICU and moved to general floor. His blood pressure was optimized <150. Neurology was consulted for possible seizure activity and recommended keppra. He underwent ORIF of Left hip fracture on . He was noted to have mod to severe AS and cardiology was consulted and recommended outpatient eval for TAVR procedure. He was also treated for UTI and completed 7 days of keflex. He was transferred to [facility] for further rehab. He has PMH of moderate to severe AS, BPH, anemia, CKD stage 3, A fib, gupta esophagus. Prior to this he lived at home with his son and daughter in law.     Today Mr. Mcdermott states he is doing pretty well today. He is not having any headaches or changes to vision. His only pain is in his left hip ut feels it is controlled. He thinks his bowels are moving well and he is eating well. His appetite is good and no issues with stomach upset or nausea. He denies any dizziness when he is up but says they have to use the stand to get up him up most o fthe time. He has a little numbness and  tingling in his left leg but not too bad. He doesn't think he has a lot of swelling and thinks this has gone down the last few days. It does get worse by the end of the day. He has a catheter in place and denies any concerns with it. He does not recall having issues urinating prior to his fall and injury but cannot recall if he had the catheter before or not. He says his pain is well controlled and no chest pain or shortness of breath. He says he might get a little winded in therapy but not too bad. He wears glasses but feels his hearing is pretty good. He has some skin tears on his left arm from his fall but otherwise no other wounds. He says he thinks he is doing pretty well for being 89.     REVIEW OF SYSTEMS:  PROBLEMS AND REVIEW OF SYSTEMS:   Review of Systems  Today on ROS:   Currently, no fever, chills, or rigors. Decreased vision and hearing. Denies any chest pain, palpitations, lightheadedness, dizziness. Appetite is good. Denies any GERD symptoms. Denies any difficulty with swallowing, nausea, or vomiting.  Denies any abdominal pain, diarrhea or constipation. No insomnia. No active bleeding. No rash. Positive for left hip incisions, left hip pain, no visual changes, headaches, shortness of breath with exertion, weakness, EZ stand for transfers, garcia catheter, swelling in left leg, numbness left leg      No Known Allergies  Current Outpatient Medications   Medication Sig   ? acetaminophen (TYLENOL) 500 MG tablet Take 2 tablets (1,000 mg total) by mouth 3 (three) times a day.   ? cephalexin (KEFLEX) 500 MG capsule Take 1 capsule (500 mg total) by mouth 4 (four) times a day for 10 days.   ? doxazosin (CARDURA) 4 MG tablet Take 1 tablet (4 mg total) by mouth daily.   ? fluticasone (FLONASE) 50 mcg/actuation nasal spray 1 spray into each nostril daily.   ? folic acid (FOLVITE) 1 MG tablet Take 1 tablet (1 mg total) by mouth daily.   ? levETIRAcetam (KEPPRA) 500 MG tablet Take 1 tablet (500 mg total) by mouth 2  (two) times a day.   ? metoprolol tartrate (LOPRESSOR) 25 MG tablet Take 1 tablet (25 mg total) by mouth daily.   ? multivitamin therapeutic tablet Take 1 tablet by mouth daily.   ? oxyCODONE (ROXICODONE) 5 MG immediate release tablet Take 0.5-1 tablets (2.5-5 mg total) by mouth every 4 (four) hours as needed.   ? pantoprazole (PROTONIX) 40 MG tablet Take 1 tablet (40 mg total) by mouth daily.   ? senna-docusate (PERICOLACE) 8.6-50 mg tablet Take 1 tablet by mouth 2 (two) times a day.     Past Medical History:    No past medical history on file.        PHYSICAL EXAMINATION  Vitals:    02/03/19 1927   BP: 125/75   Pulse: 76   Resp: 12   Temp: 98.4  F (36.9  C)   SpO2: 96%   Weight: 173 lb (78.5 kg)       Today on physical exam:     GENERAL: Awake, Alert, oriented x3, not in any form of acute distress, answers questions appropriately, follows simple commands, conversant  HEENT: Head is normocephalic with normal hair distribution. No evidence of trauma. Ears: No acute purulent discharge. Eyes: Conjunctivae pink with no scleral jaundice. Nose: Normal mucosa and septum. NECK: Supple with no cervical or supraclavicular lymphadenopathy. Trachea is midline. glasses  CHEST: No tenderness or deformity, no crepitus  LUNG: Clear to auscultation with good chest expansion. There are no crackles or wheezes, normal AP diameter.  BACK: No kyphosis of the thoracic spine. Symmetric, no curvature, ROM normal, no CVA tenderness, no spinal tenderness   CVS: irregularly irregular rhythm, systolic murmur, no rubs, gallops, or heaves,  2+ pulses symmetric in all extremities.  ABDOMEN: Rounded and soft, nontender to palpation, non distended, no masses, no organomegaly, good bowel sounds, no rebound or guarding, no peritoneal signs.   EXTREMITIES: 1+ LLE pedal edema, Left hip tw incisions c/d/i, Left hip pain and tenderness with ROM  SKIN: Warm and dry, scattered ecchymosis, two small abrasions to Left forearm and elbow  NEUROLOGICAL: The  patient is oriented to person, place and time but is forgetful at times. No movement disorder, no arm drift, visual changes, no facial droop, unilateral weakness, mild numbness and tingling in left leg.             LABS:   Recent Results (from the past 168 hour(s))   POCT Glucose   Result Value Ref Range    Glucose 98 70 - 139 mg/dL   Phosphorus   Result Value Ref Range    Phosphorus 2.5 2.5 - 4.5 mg/dL   Magnesium   Result Value Ref Range    Magnesium 2.2 1.8 - 2.6 mg/dL   HGB   Result Value Ref Range    Hemoglobin 8.0 (L) 14.0 - 18.0 g/dL   Potassium - Next AM   Result Value Ref Range    Potassium 4.0 3.5 - 5.0 mmol/L   POCT Glucose   Result Value Ref Range    Glucose 114 70 - 139 mg/dL   POCT Glucose   Result Value Ref Range    Glucose 114 70 - 139 mg/dL   Phosphorus   Result Value Ref Range    Phosphorus 2.4 (L) 2.5 - 4.5 mg/dL   Magnesium   Result Value Ref Range    Magnesium 2.0 1.8 - 2.6 mg/dL   Potassium - Next AM   Result Value Ref Range    Potassium 4.0 3.5 - 5.0 mmol/L   Creatinine   Result Value Ref Range    Creatinine 1.03 0.70 - 1.30 mg/dL    GFR MDRD Af Amer >60 >60 mL/min/1.73m2    GFR MDRD Non Af Amer >60 >60 mL/min/1.73m2   POCT Glucose   Result Value Ref Range    Glucose 101 70 - 139 mg/dL   Phosphorus   Result Value Ref Range    Phosphorus 2.7 2.5 - 4.5 mg/dL   Magnesium   Result Value Ref Range    Magnesium 2.1 1.8 - 2.6 mg/dL   Phosphorus   Result Value Ref Range    Phosphorus 2.9 2.5 - 4.5 mg/dL   Magnesium   Result Value Ref Range    Magnesium 2.1 1.8 - 2.6 mg/dL   Potassium   Result Value Ref Range    Potassium 4.1 3.5 - 5.0 mmol/L   POCT Glucose   Result Value Ref Range    Glucose 97 70 - 139 mg/dL     Results for orders placed or performed during the hospital encounter of 01/26/19   Basic Metabolic Panel   Result Value Ref Range    Sodium 142 136 - 145 mmol/L    Potassium 4.2 3.5 - 5.0 mmol/L    Chloride 114 (H) 98 - 107 mmol/L    CO2 18 (L) 22 - 31 mmol/L    Anion Gap, Calculation 10 5 -  18 mmol/L    Glucose 84 70 - 125 mg/dL    Calcium 8.0 (L) 8.5 - 10.5 mg/dL    BUN 28 8 - 28 mg/dL    Creatinine 1.08 0.70 - 1.30 mg/dL    GFR MDRD Af Amer >60 >60 mL/min/1.73m2    GFR MDRD Non Af Amer >60 >60 mL/min/1.73m2         Lab Results   Component Value Date    WBC 7.2 01/27/2019    HGB 8.0 (L) 01/29/2019    HCT 31.7 (L) 01/27/2019    MCV 85 01/27/2019     (L) 01/27/2019       Lab Results   Component Value Date    BZOQIWBM01 637 01/28/2019     No results found for: HGBA1C  Lab Results   Component Value Date    INR 1.18 (H) 01/26/2019    INR 1.27 (H) 07/23/2018    INR 1.21 (H) 05/16/2018     No results found for: [id number]  Lab Results   Component Value Date    TSH 3.19 01/28/2019           ASSESSMENT/PLAN:    1. Subdural hematoma, fall: No headaches, visual changes, no neuro deficits noted. Neuro checks daily. F/u with Neurosurgery in 2 weeks with head CT, scheduled for 2/11. Keppra for seizure prophylaxis.   2. Left hip fracture, s/p ORIF: Incisions c/d/i, 1+ edema LLE. Pain controlled. Tylenol three times a day, oxycodone prn. F/u with ortho in 2 weeks. Ordered tevin hose. Ice prn. PT, OT.   3. Moderate-severe AS: Was being evaluated for LAAO and TAVR procedure prior to hospital stay. Will need to f/u with cardiology once more stable.   4. BPH: On doxazosin. Jimenez catheter in place, appears was placed on 1/26 with no void trial inpatient. No h/o urinary retention. Will need void trial during stay.   5. A fibrillation: Rate controlled in 70s. On metoprolol. Added hold parameters. No anticoagulants, antiplatelets until cleared per neurosurgery.   6. Macdonald's esophagus, h/o GI bleed: On pantoprazole. No current issues.   7. Iron deficiency anemia, acute blood loss anemia: Follows with hem/onc for weekly/biweekly hg levels, blood transfusions, iron infusions. On folic acid. Hg 8 on 1/29.   8. CKD stage 3: Cr level 1.03 on 1/30.   9. UTI: On keflex x 10 days.   10. Allergic rhinitis: On flonase.   11.  Constipation: On senna docusate two times a day.     [facility], hm1 on 2/5    Per therapy eval today    Electronically signed by: Homa Smyth NP    Total 45 minutes of which 55% was spent in counseling and coordination of care of the above plan    Time spent in interview and examination of patient, review of available records, and discussion with nursing staff. Continue care plan, efforts at therapy, and monitor nutritional status.

## 2021-06-18 NOTE — LETTER
"Letter by Alissa Moody CNP at      Author: Alissa Moody CNP Service: -- Author Type: --    Filed:  Encounter Date: 2019 Status: (Other)         Patient: Lopez Mcdermott   MR Number: [id number]   YOB: 1929   Date of Visit: 2019         Name:  Lopez Mcdermott  :  1929  MRN: [id number]  Code Status:  Full Code    Visit Type: Review Of Multiple Medical Conditions     Facility:  [facility] [[id number]]  Facility Type:     History of Present Illness:  This is an 89 year old male here following hospitalization following a fall. He was found to have a subdural hematoma ,and a traumatic comminuted intertrochanteric left hip fracture. He was placed on seizure medication and will be followed up in 2 weeks for a head CT. Patient did have surgery after ortho was consulted,s/p internal fixation, fracture, trochanteric, hip, using pins or rods, using [place] Table. Post surgical course was uneventful. Patient was started on keflex for UTI for 7 days. He was also found to have iron deficiency anemia due to chronic blood loss, recommended follow-up. Reports revealed patient has moderate to severe aortic stenosis, with follow up . A catheter is in place secondary to BPH.  Resident was admitted 19 to facility. He is alert with some forgetfulness at times. He is assist of 2 with EZ stand, assist of 2 with all transfers and assist of 1 with all ADL's. He is on a regular diet, takes medications whole. Staff has no issues of concern today.      No past medical history on file.  Past Surgical History:   Procedure Laterality Date   ? CERVICAL FUSION  , 1977    x2   ? COLONOSCOPY  2017   ? COLONOSCOPY  2018    Multiple diverticula. \"Evidence of recent bleeding from diverticular opening.\"    ? COLONOSCOPY  2018    Black material was found in the entire colon.     ? COLONOSCOPY W/ POLYPECTOMY  2004   ? ESOPHAGOGASTRODUODENOSCOPY  1998    " "Healed antral ulcers.    ? ESOPHAGOGASTRODUODENOSCOPY  1998    Hiatal hernia with reflux esophagitis and distal esophageal erosions, multiple antral ulcers.   ? 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   ? ESOPHAGOGASTRODUODENOSCOPY  2017   ? ESOPHAGOGASTRODUODENOSCOPY  10/24/2017   ? ESOPHAGOGASTRODUODENOSCOPY  2018   ? ESOPHAGOGASTRODUODENOSCOPY  2018   ? HIP PINNING Left 2019    Procedure: INTERNAL FIXATION, FRACTURE, TROCHANTERIC, HIP, USING PINS OR RODS, USING HANA TABLE;  Surgeon: Fran Chi MD;  Location: [facility];  Service: Orthopedics   ? INGUINAL HERNIA REPAIR Bilateral 2001    Right indirect and direct inguinal hernia, left direct inguinal hernia.    ? TRANSURETHRAL RESECTION OF PROSTATE  2006    Cystoscopy, KTP laser TURP.   ? TRANSURETHRAL RESECTION OF PROSTATE  10/21/2009    CYSTOSCOPY, TUR OF PROSTATE WITH KTP LASER - PROCEDURE: CYSTOSCOPY AND TRANSURETHRAL RESECTION OF PROSTATE WITH KTP LASER     Family History   Problem Relation Age of Onset   ? Heart disease Father    ? Arthritis Son    ? Asthma Son    ? Asthma Son      Social History     Tobacco Use   ? Smoking status: Former Smoker     Packs/day: 2.00     Types: Cigarettes     Last attempt to quit: 1987     Years since quittin.7   ? Smokeless tobacco: Never Used   Substance Use Topics   ? Alcohol use: No     Comment: \"not much in the last few years\"   ? Drug use: No       Medications:  Current Outpatient Medications   Medication Sig Dispense Refill   ? acetaminophen (TYLENOL) 500 MG tablet Take 2 tablets (1,000 mg total) by mouth 3 (three) times a day.  0   ? cephalexin (KEFLEX) 500 MG capsule Take 1 capsule (500 mg total) by mouth 4 (four) times a day for 10 days. 40 capsule 0   ? doxazosin (CARDURA) 4 MG tablet Take 1 tablet (4 " "mg total) by mouth daily. 90 tablet 3   ? fluticasone (FLONASE) 50 mcg/actuation nasal spray 1 spray into each nostril daily. 16 g 5   ? folic acid (FOLVITE) 1 MG tablet Take 1 tablet (1 mg total) by mouth daily. 90 tablet 3   ? levETIRAcetam (KEPPRA) 500 MG tablet Take 1 tablet (500 mg total) by mouth 2 (two) times a day. 60 tablet 1   ? metoprolol tartrate (LOPRESSOR) 25 MG tablet Take 1 tablet (25 mg total) by mouth daily. 90 tablet 3   ? multivitamin therapeutic tablet Take 1 tablet by mouth daily.     ? oxyCODONE (ROXICODONE) 5 MG immediate release tablet Take 0.5-1 tablets (2.5-5 mg total) by mouth every 4 (four) hours as needed. 20 tablet 0   ? pantoprazole (PROTONIX) 40 MG tablet Take 1 tablet (40 mg total) by mouth daily. 90 tablet 3   ? senna-docusate (PERICOLACE) 8.6-50 mg tablet Take 1 tablet by mouth 2 (two) times a day.  0     No current facility-administered medications for this visit.          No Known Allergies    Vital Signs:   B/P115/60 02sats room air 96% afebrile P 74 R 16 .5    ROS:   Resident denies any uncontrolled pain- is on oxycodone- ,no  nausea, vomiting, dizziness headache, shortness of breath, vision bowel or night sweats fever chills chest pain abdominal pain. No constipation loose stools. Swallows okay eats okay. Catheter in place. \"I like therapy\".    PHYSICAL EXAM:  General: This is an alert male up in his room in NAD  HEENT:Normocephalic/atraumatic Conjunctivae without erythema nares are clear of any drainage.  Neck: No adenopathy JVD thyromegaly  Resp: Clear No rhonchi wheezing rales  Cardio: Regular rate and rhythm No murmurs appreciated  Abdomen: Soft nontender no masses distention bowel sounds are present.  Extremities: No lower extremity edema fair peripheral pulses-dressing on left leg- intact  Skin: No noted skin issues  : Catheter patent  Musculoskeletal: Age-related degenerative joint disease  Psych: alert and conversive     Labs:   Admission on 01/26/2019, " Discharged on 02/01/2019   Component Date Value Ref Range Status   ? ABORh 01/26/2019 O POS   Final   ? Antibody Screen 01/26/2019 Negative  Negative Final   ? Sodium 01/27/2019 145  136 - 145 mmol/L Final   ? Potassium 01/27/2019 3.8  3.5 - 5.0 mmol/L Final   ? Chloride 01/27/2019 114* 98 - 107 mmol/L Final   ? CO2 01/27/2019 19* 22 - 31 mmol/L Final   ? Anion Gap, Calculation 01/27/2019 12  5 - 18 mmol/L Final   ? Glucose 01/27/2019 98  70 - 125 mg/dL Final   ? Calcium 01/27/2019 8.4* 8.5 - 10.5 mg/dL Final   ? BUN 01/27/2019 32* 8 - 28 mg/dL Final   ? Creatinine 01/27/2019 1.18  0.70 - 1.30 mg/dL Final   ? GFR MDRD Af Amer 01/27/2019 >60  >60 mL/min/1.73m2 Final   ? GFR MDRD Non Af Amer 01/27/2019 58* >60 mL/min/1.73m2 Final   ? WBC 01/27/2019 7.2  4.0 - 11.0 thou/uL Final   ? RBC 01/27/2019 3.73* 4.40 - 6.20 mill/uL Final   ? Hemoglobin 01/27/2019 9.7* 14.0 - 18.0 g/dL Final   ? Hematocrit 01/27/2019 31.7* 40.0 - 54.0 % Final   ? MCV 01/27/2019 85  80 - 100 fL Final   ? MCH 01/27/2019 26.0* 27.0 - 34.0 pg Final   ? MCHC 01/27/2019 30.6* 32.0 - 36.0 g/dL Final   ? RDW 01/27/2019 18.0* 11.0 - 14.5 % Final   ? Platelets 01/27/2019 127* 140 - 440 thou/uL Final   ? MPV 01/27/2019 10.8  8.5 - 12.5 fL Final   ? Phosphorus 01/27/2019 3.1  2.5 - 4.5 mg/dL Final   ? Magnesium 01/27/2019 2.1  1.8 - 2.6 mg/dL Final   ? Glucose 01/27/2019 113  70 - 139 mg/dL Final   ? PFA-COL/EPI 01/27/2019 46  1 - 180 sec Final   ? Glucose 01/27/2019 93  70 - 139 mg/dL Final   ? Potassium 01/27/2019 4.5  3.5 - 5.0 mmol/L Final   ? Phosphorus 01/28/2019 3.1  2.5 - 4.5 mg/dL Final   ? Magnesium 01/28/2019 2.1  1.8 - 2.6 mg/dL Final   ? Vitamin B-12 01/28/2019 637  213 - 816 pg/mL Final   ? TSH 01/28/2019 3.19  0.30 - 5.00 uIU/mL Final   ? Hemoglobin 01/28/2019 8.3* 14.0 - 18.0 g/dL Final   ? Sodium 01/28/2019 142  136 - 145 mmol/L Final   ? Potassium 01/28/2019 4.2  3.5 - 5.0 mmol/L Final   ? Chloride 01/28/2019 114* 98 - 107 mmol/L Final   ?  CO2 01/28/2019 18* 22 - 31 mmol/L Final   ? Anion Gap, Calculation 01/28/2019 10  5 - 18 mmol/L Final   ? Glucose 01/28/2019 84  70 - 125 mg/dL Final   ? Calcium 01/28/2019 8.0* 8.5 - 10.5 mg/dL Final   ? BUN 01/28/2019 28  8 - 28 mg/dL Final   ? Creatinine 01/28/2019 1.08  0.70 - 1.30 mg/dL Final   ? GFR MDRD Af Amer 01/28/2019 >60  >60 mL/min/1.73m2 Final   ? GFR MDRD Non Af Amer 01/28/2019 >60  >60 mL/min/1.73m2 Final   ? Glucose 01/28/2019 78  70 - 139 mg/dL Final   ? Levetiracetam 01/28/2019 17.3  6.0 - 46.0 ug/mL Final   ? Glucose 01/28/2019 163* 70 - 139 mg/dL Final   ? Glucose 01/28/2019 98  70 - 139 mg/dL Final   ? Phosphorus 01/29/2019 2.5  2.5 - 4.5 mg/dL Final   ? Magnesium 01/29/2019 2.2  1.8 - 2.6 mg/dL Final   ? Hemoglobin 01/29/2019 8.0* 14.0 - 18.0 g/dL Final   ? Potassium 01/29/2019 4.0  3.5 - 5.0 mmol/L Final   ? Glucose 01/29/2019 114  70 - 139 mg/dL Final   ? Glucose 01/29/2019 114  70 - 139 mg/dL Final   ? Phosphorus 01/30/2019 2.4* 2.5 - 4.5 mg/dL Final   ? Magnesium 01/30/2019 2.0  1.8 - 2.6 mg/dL Final   ? Potassium 01/30/2019 4.0  3.5 - 5.0 mmol/L Final   ? Glucose 01/30/2019 101  70 - 139 mg/dL Final   ? Creatinine 01/30/2019 1.03  0.70 - 1.30 mg/dL Final   ? GFR MDRD Af Amer 01/30/2019 >60  >60 mL/min/1.73m2 Final   ? GFR MDRD Non Af Amer 01/30/2019 >60  >60 mL/min/1.73m2 Final   ? Phosphorus 01/31/2019 2.7  2.5 - 4.5 mg/dL Final   ? Magnesium 01/31/2019 2.1  1.8 - 2.6 mg/dL Final   ? Phosphorus 02/01/2019 2.9  2.5 - 4.5 mg/dL Final   ? Magnesium 02/01/2019 2.1  1.8 - 2.6 mg/dL Final   ? Potassium 02/01/2019 4.1  3.5 - 5.0 mmol/L Final   ? Glucose 02/01/2019 97  70 - 139 mg/dL Final   Admission on 01/26/2019, Discharged on 01/26/2019   Component Date Value Ref Range Status   ? SYSTOLIC BLOOD PRESSURE 01/26/2019 173  mmHg Final   ? DIASTOLIC BLOOD PRESSURE 01/26/2019 91  mmHg Final   ? VENTRICULAR RATE 01/26/2019 101  BPM Final   ? ATRIAL RATE 01/26/2019 153  BPM Final   ? QRS DURATION  01/26/2019 90  ms Final   ? Q-T INTERVAL 01/26/2019 372  ms Final   ? QTC CALCULATION (BEZET) 01/26/2019 482  ms Final   ? R AXIS 01/26/2019 69  degrees Final   ? T AXIS 01/26/2019 81  degrees Final   ? MUSE DIAGNOSIS 01/26/2019    Final                    Value:Possible Atrial fibrillation  Septal infarct (cited on or before 14-MAY-2018)  Abnormal ECG  When compared with ECG of 13-DEC-2018 10:44,  QT has lengthened  Confirmed by JANNETTE JOSEPH MD LOC: ([id number]) on 1/27/2019 2:46:36 PM     ? Sodium 01/26/2019 144  136 - 145 mmol/L Final   ? Potassium 01/26/2019 4.1  3.5 - 5.0 mmol/L Final   ? Chloride 01/26/2019 110* 98 - 107 mmol/L Final   ? CO2 01/26/2019 20* 22 - 31 mmol/L Final   ? Anion Gap, Calculation 01/26/2019 14  5 - 18 mmol/L Final   ? Glucose 01/26/2019 120  70 - 125 mg/dL Final   ? Calcium 01/26/2019 8.9  8.5 - 10.5 mg/dL Final   ? BUN 01/26/2019 36* 8 - 28 mg/dL Final   ? Creatinine 01/26/2019 1.36* 0.70 - 1.30 mg/dL Final   ? GFR MDRD Af Amer 01/26/2019 60* >60 mL/min/1.73m2 Final   ? GFR MDRD Non Af Amer 01/26/2019 49* >60 mL/min/1.73m2 Final   ? Color, UA 01/26/2019 Deb* Colorless, Yellow, Straw, Light Yellow Final   ? Clarity, UA 01/26/2019 Hazy* Clear Final   ? Glucose, UA 01/26/2019 Negative  Negative Final   ? Bilirubin, UA 01/26/2019 Negative  Negative Final   ? Ketones, UA 01/26/2019 Negative  Negative Final   ? Specific Gravity, UA 01/26/2019 1.010  1.001 - 1.030 Final   ? Blood, UA 01/26/2019 Large* Negative Final   ? pH, UA 01/26/2019 6.0  4.5 - 8.0 Final   ? Protein, UA 01/26/2019 100 mg/dL* Negative mg/dL Final   ? Urobilinogen, UA 01/26/2019 <2.0 E.U./dL  <2.0 E.U./dL, 2.0 E.U./dL Final   ? Nitrite, UA 01/26/2019 Negative  Negative Final   ? Leukocytes, UA 01/26/2019 Large* Negative Final   ? Bacteria, UA 01/26/2019 Many* None Seen hpf Final   ? RBC, UA 01/26/2019 * None Seen, 0-2 hpf Final   ? WBC, UA 01/26/2019 >100* None Seen, 0-5 hpf Final   ? Squam Epithel, UA 01/26/2019  0-5  None Seen, 0-5 lpf Final   ? WBC Clumps 01/26/2019 Present* None Seen Final   ? Mucus, UA 01/26/2019 Few* None Seen lpf Final   ? INR 01/26/2019 1.18* 0.90 - 1.10 Final   ? PTT 01/26/2019 37  24 - 37 seconds Final   ? WBC 01/26/2019 8.2  4.0 - 11.0 thou/uL Final   ? RBC 01/26/2019 3.92* 4.40 - 6.20 mill/uL Final   ? Hemoglobin 01/26/2019 10.5* 14.0 - 18.0 g/dL Final   ? Hematocrit 01/26/2019 34.0* 40.0 - 54.0 % Final   ? MCV 01/26/2019 87  80 - 100 fL Final   ? MCH 01/26/2019 26.8* 27.0 - 34.0 pg Final   ? MCHC 01/26/2019 30.9* 32.0 - 36.0 g/dL Final   ? RDW 01/26/2019 17.9* 11.0 - 14.5 % Final   ? Platelets 01/26/2019 121* 140 - 440 thou/uL Final   ? MPV 01/26/2019 10.8  8.5 - 12.5 fL Final   ? Neutrophils % 01/26/2019 79* 50 - 70 % Final   ? Lymphocytes % 01/26/2019 10* 20 - 40 % Final   ? Monocytes % 01/26/2019 10  2 - 10 % Final   ? Eosinophils % 01/26/2019 1  0 - 6 % Final   ? Basophils % 01/26/2019 0  0 - 2 % Final   ? Neutrophils Absolute 01/26/2019 6.4  2.0 - 7.7 thou/uL Final   ? Lymphocytes Absolute 01/26/2019 0.8  0.8 - 4.4 thou/uL Final   ? Monocytes Absolute 01/26/2019 0.9  0.0 - 0.9 thou/uL Final   ? Eosinophils Absolute 01/26/2019 0.1  0.0 - 0.4 thou/uL Final   ? Basophils Absolute 01/26/2019 0.0  0.0 - 0.2 thou/uL Final   ? CK, Total 01/26/2019 242* 30 - 190 U/L Final   ? Culture 01/26/2019 >100,000 col/ml Klebsiella oxytoca*  Final   Lab Standing Order on 01/14/2019   Component Date Value Ref Range Status   ? WBC 01/14/2019 8.1  4.0 - 11.0 thou/uL Final   ? RBC 01/14/2019 3.73* 4.40 - 6.20 mill/uL Final   ? Hemoglobin 01/14/2019 10.1* 14.0 - 18.0 g/dL Final   ? Hematocrit 01/14/2019 33.7* 40.0 - 54.0 % Final   ? MCV 01/14/2019 90  80 - 100 fL Final   ? MCH 01/14/2019 27.1  27.0 - 34.0 pg Final   ? MCHC 01/14/2019 30.0* 32.0 - 36.0 g/dL Final   ? RDW 01/14/2019 16.6* 11.0 - 14.5 % Final   ? Platelets 01/14/2019 171  140 - 440 thou/uL Final   ? MPV 01/14/2019 9.2  8.5 - 12.5 fL Final   Hospital  Outpatient Visit on 01/09/2019   Component Date Value Ref Range Status   ? POC GFR AMER AF HE 01/09/2019 >60   >60 mL/min/1.73m2 Final   ? POC GFR NON AMER AF 01/09/2019 52 * >60 mL/min/1.73m2 Final   ? POC Creatinine 01/09/2019 1.3  mg/dL Final        Diagnoses:    ICD-10-CM    1. Subdural hematoma (H) S06.5X9A    2. Closed displaced intertrochanteric fracture of left femur, initial encounter (H) S72.142A    3. Fall, initial encounter W19.XXXA    4. Acute cystitis without hematuria N30.00    5. Seizures (H) R56.9    6. Severe aortic stenosis I35.0    7. Iron deficiency anemia due to chronic blood loss D50.0    8. Physical deconditioning R53.81    9. Benign prostatic hyperplasia, unspecified whether lower urinary tract symptoms present N40.0        Plan: We will continue to monitor for seizures secondary to subdural hematoma-follow up with neurology as advised.(he is on Keppra) Resident is treated for UTI on keflex and does have a catheter in place-with a dx of BPH. He is in therapy secondary to Left femur fracture- and physical deconditioning- and he seems to like the therapy he is receiving, we will continue with OT PT. He will follow up with ortho as advised. He does have a history of Iron deficency anemia secondary to blood loss, with last hemoglobin at 10.5 on 1/26/19. Continue to follow HBG as indicated and recheck hemogram1 next week. Patient will have a TAVR evaluation as outpatient-f/u with cardiology 2-3 weeks (severe aortic stenosis).,and follow up with primary MD in 3-4 weeks.  VSS have been stable, we will continue to monitor and continue with current care plan as indicated.Patient status does currently appear to be stable.  This is patients admission note.    Electronically signed by: Alissa Moody CNP

## 2021-06-18 NOTE — LETTER
Letter by Homa Smyth NP at      Author: Homa Smyth NP Service: -- Author Type: --    Filed:  Encounter Date: 3/5/2019 Status: (Other)         Patient: Lopez Mcdermott   MR Number: [id number]   YOB: 1929   Date of Visit: 3/5/2019                 [facility]    DATE: 3/5/2019    NAME:  Lopez Mcdermott             :  1929  MRN: [id number]  CODE STATUS:  FULL CODE    VISIT TYPE: Review Of Multiple Medical Conditions     FACILITY:  [facility] [[id number]]       CHIEF COMPLAIN/REASON FOR VISIT:    Chief Complaint   Patient presents with   ? Review Of Multiple Medical Conditions               HISTORY OF PRESENT ILLNESS: Lopez Mcdermott is a 89 y.o. male who was admitted - for fall and found to have subdural hematoma on CT and Left comminuted intertrochanteric hip fracture. Neurosurgery was consulted and recommended conservative management. He was monitored in ICU and moved to general floor. His blood pressure was optimized <150. Neurology was consulted for possible seizure activity and recommended keppra. He underwent ORIF of Left hip fracture on . He was noted to have mod to severe AS and cardiology was consulted and recommended outpatient eval for TAVR procedure. He was also treated for UTI and completed 7 days of keflex. He was transferred to [facility] for further rehab. He has PMH of moderate to severe AS, BPH, anemia, CKD stage 3, A fib, gupta esophagus. Prior to this he lived at home with his son and daughter in law.     Today Mr. Mcdermott is seen for review of systems. He reports he is doing pretty well except that he was waiting for someone to help him get ready this morning and no one came. He denies pushing his call button. He is a little frustrated and distracted during exam. He says he does not have any pain right now in his hip and very minimal when walking. He does not think he has needed the tylenol recently. He says his  bowels are still moving almost every day. He denies any dizziness or other concerns recently. Reviewed neurosurgery notes from 3/1 and stopped keppra.     REVIEW OF SYSTEMS:  PROBLEMS AND REVIEW OF SYSTEMS:   Review of Systems  Today on ROS:   Currently, no fever, chills, or rigors. Decreased vision and hearing. Denies any chest pain, palpitations, lightheadedness, dizziness. Appetite is good. Denies any GERD symptoms. Denies any difficulty with swallowing, nausea, or vomiting.  Denies any abdominal pain, diarrhea or constipation. No active bleeding. No rash. Positive for left hip incisions healed, left hip pain minimal, no visual changes, headaches, no recent shortness of breath, no further swelling in left leg, numbness left leg, urinary retention-straight cath 4 times daily      No Known Allergies  Current Outpatient Medications   Medication Sig   ? acetaminophen (TYLENOL) 500 MG tablet Take 2 tablets (1,000 mg total) by mouth 3 (three) times a day. (Patient taking differently: Take 1,000 mg by mouth every 6 (six) hours as needed.       )   ? doxazosin (CARDURA) 4 MG tablet Take 1 tablet (4 mg total) by mouth daily.   ? ferrous sulfate 325 (65 FE) MG tablet Take 1 tablet by mouth 2 (two) times a day.   ? fluticasone (FLONASE) 50 mcg/actuation nasal spray 1 spray into each nostril daily.   ? levETIRAcetam (KEPPRA) 500 MG tablet Take 1 tablet (500 mg total) by mouth 2 (two) times a day.   ? metoprolol tartrate (LOPRESSOR) 25 MG tablet Take 1 tablet (25 mg total) by mouth daily.   ? multivitamin therapeutic tablet Take 1 tablet by mouth daily.   ? pantoprazole (PROTONIX) 40 MG tablet Take 1 tablet (40 mg total) by mouth daily.   ? senna-docusate (PERICOLACE) 8.6-50 mg tablet Take 1 tablet by mouth 2 (two) times a day.   ? tamsulosin (FLOMAX) 0.4 mg cap Take 0.4 mg by mouth.     Past Medical History:    No past medical history on file.        PHYSICAL EXAMINATION  Vitals:    03/04/19 2038   BP: 110/56   Pulse: (!) 52    Resp: 18   Temp: 97  F (36.1  C)   SpO2: 97%   Weight: 169 lb (76.7 kg)       Today on physical exam:     GENERAL: Awake, Alert, oriented x3, not in any form of acute distress, answers questions appropriately, follows simple commands, conversant  HEENT: Head is normocephalic with normal hair distribution. No evidence of trauma. Ears: No acute purulent discharge. Eyes: Conjunctivae pink with no scleral jaundice. Nose: Normal mucosa and septum. NECK: Supple with no cervical or supraclavicular lymphadenopathy. Trachea is midline. glasses  CHEST: No tenderness or deformity, no crepitus  LUNG: Clear to auscultation with good chest expansion. There are no crackles or wheezes, normal AP diameter.  BACK: No kyphosis of the thoracic spine. Symmetric, no curvature, ROM normal, no CVA tenderness, no spinal tenderness   CVS: irregularly irregular rhythm, systolic murmur, no rubs, gallops, or heaves,  2+ pulses symmetric in all extremities.  ABDOMEN: Rounded and soft, nontender to palpation, non distended, no masses, no organomegaly, good bowel sounds, no rebound or guarding, no peritoneal signs.   EXTREMITIES: no LLE pedal edema, Left hip two incisions healed, minimal Left hip pain and tenderness with ROM, no rle edema  SKIN: Warm and dry,   NEUROLOGICAL: The patient is oriented to person, place and time but is forgetful at times. No movement disorder, no arm drift, visual changes, no facial droop, unilateral weakness, mild numbness and tingling in left leg.             LABS:   No results found for this or any previous visit (from the past 168 hour(s)).  Results for orders placed or performed in visit on 02/05/19   Basic Metabolic Panel   Result Value Ref Range    Sodium 138 136 - 145 mmol/L    Potassium 3.8 3.5 - 5.0 mmol/L    Chloride 104 98 - 107 mmol/L    CO2 26 22 - 31 mmol/L    Anion Gap, Calculation 8 5 - 18 mmol/L    Glucose 87 70 - 125 mg/dL    Calcium 8.7 8.5 - 10.5 mg/dL    BUN 20 8 - 28 mg/dL    Creatinine 0.97  0.70 - 1.30 mg/dL    GFR MDRD Af Amer >60 >60 mL/min/1.73m2    GFR MDRD Non Af Amer >60 >60 mL/min/1.73m2         Lab Results   Component Value Date    WBC 9.1 02/19/2019    HGB 9.6 (L) 02/19/2019    HCT 31.9 (L) 02/19/2019    MCV 92 02/19/2019     02/19/2019       Lab Results   Component Value Date    [id number] 637 01/28/2019     No results found for: HGBA1C  Lab Results   Component Value Date    INR 1.18 (H) 01/26/2019    INR 1.27 (H) 07/23/2018    INR 1.21 (H) 05/16/2018     No results found for: [id number]  Lab Results   Component Value Date    TSH 3.19 01/28/2019           ASSESSMENT/PLAN:    1. Subdural hematoma, fall: No headaches, visual changes, no neuro deficits noted. Neuro checks daily. F/u with Neurosurgery in 2 weeks with head CT, scheduled for 2/11-apparently missed CT appt, now rescheduled for 3/1-stopped keppra, increase activity as tolerated, avoid falls, f/u prn.  No recent neuro concerns. CT showed interval resolution of left frontoparietal low attenuation subdural hematoma, interval resolution of small amt of intraventricular hemorrhage left occipital horn, mod generalized volume loss and chronic ischemic changes.   2. Left hip fracture, s/p ORIF: Incisions c/d/i, 1+ edema LLE. F/u with ortho in 2 weeks-no appt scheduled, needs f/u appt, reminded staff again today as no appt scheduled. tevin hose. Ice prn. PT, OT.  Pain controlled tylenol prn    3. Moderate-severe AS: Was being evaluated for LAAO and TAVR procedure prior to hospital stay. Will need to f/u with cardiology once more stable.   4. BPH, straight cath: On doxazosin and added flomax  straight cath four times a day as doing at home and f/u with urology after discharge. No recent concerns, staff assisting patient in doing.   5. A fibrillation: Rate controlled in 50s. On metoprolol. hold parameters. No anticoagulants, antiplatelets until cleared per neurosurgery-reviewed 3/1 note and Ct showed interval resolution of hemorrhage.  Will continue off antiplatelets, anticoagulants due to anemia and recent SDH for now. F/u with cardiology regarding.   6. Macdonald's esophagus, h/o GI bleed: On pantoprazole. No current issues.   7. Iron deficiency anemia, acute blood loss anemia: Follows with hem/onc for weekly/biweekly hg levels, blood transfusions, iron infusions. On folic acid. Hg 8 on 1/29. 8.7 on 2/5. HG 8.5 on 2/11, stable. Appt with hematology and infusion clinic on 2/19-iron and transferrin, ferritin stable no need for iv infusion of iron. Hg improved to 9.6. Recommended iron two times a day so this was restarted. F/u again on 4/2. Recommended gi follow up.   8. CKD stage 3: Cr level 1.03 on 1/30. Cr 0.97 on 2/5. Stable.   9. UTI: completed keflex.   10. Allergic rhinitis: On flonase.   11. Constipation: On senna docusate two times a day. BM nearly every day.       Per therapy soft LCD 3/11 sba for toileting and dressing, ambulates 45-50 feet with walker and cga, 11 on slums, 4.2 on cpt. HH PT, OT, HHA. Recommending 24 hour supervision.     Electronically signed by: Homa Smyth NP    Time spent in interview and examination of patient, review of available records, and discussion with nursing staff. Continue care plan, efforts at therapy, and monitor nutritional status.

## 2021-06-18 NOTE — LETTER
Letter by Homa Smyth NP at      Author: Homa Smyth NP Service: -- Author Type: --    Filed:  Encounter Date: 2019 Status: (Other)         Patient: Lopez Mcdermott   MR Number: [id number]   YOB: 1929   Date of Visit: 2019                 [facility]    DATE: 2019    NAME:  Lopez Mcdermott             :  1929  MRN: [id number]  CODE STATUS:  FULL CODE    VISIT TYPE: Review Of Multiple Medical Conditions     FACILITY:  [facility] [[id number]]       CHIEF COMPLAIN/REASON FOR VISIT:    Chief Complaint   Patient presents with   ? Review Of Multiple Medical Conditions               HISTORY OF PRESENT ILLNESS: Lopez Mcdermott is a 89 y.o. male who was admitted - for fall and found to have subdural hematoma on CT and Left comminuted intertrochanteric hip fracture. Neurosurgery was consulted and recommended conservative management. He was monitored in ICU and moved to general floor. His blood pressure was optimized <150. Neurology was consulted for possible seizure activity and recommended keppra. He underwent ORIF of Left hip fracture on . He was noted to have mod to severe AS and cardiology was consulted and recommended outpatient eval for TAVR procedure. He was also treated for UTI and completed 7 days of keflex. He was transferred to [facility] for further rehab. He has PMH of moderate to severe AS, BPH, anemia, CKD stage 3, A fib, gupta esophagus. Prior to this he lived at home with his son and daughter in law.     Today Mr. Mcdermott is seen in his wheelchair in his room today. He reports he had some pain earlier this morning but now it feels much better. He says he thinks the nurse went to get him pain medicine and now is not sure if he needs it or not. He thinks he should probably take it just because it was hurting. He says his pain has been doing much better and even having little to no pain in therapy now. He takes  the tylenol when he needs it. He says his bowels are moving nearly every day. He had one yesterday. He says he sleeps fine and appetite is good. He thinks therapy is going well but not sure how much longer he will be here yet. He denies any other concerns today. His weights have been stable at 166lbs. No appt yet for ortho but sees neurosurgery on 3/1.     REVIEW OF SYSTEMS:  PROBLEMS AND REVIEW OF SYSTEMS:   Review of Systems  Today on ROS:   Currently, no fever, chills, or rigors. Decreased vision and hearing. Denies any chest pain, palpitations, lightheadedness, dizziness. Appetite is good. Denies any GERD symptoms. Denies any difficulty with swallowing, nausea, or vomiting.  Denies any abdominal pain, diarrhea or constipation. No active bleeding. No rash. Positive for left hip incisions, left hip pain, no visual changes, headaches, shortness of breath with exertion, swelling in left leg, numbness left leg, urinary retention-straight cath 4 times daily      No Known Allergies  Current Outpatient Medications   Medication Sig   ? acetaminophen (TYLENOL) 500 MG tablet Take 2 tablets (1,000 mg total) by mouth 3 (three) times a day. (Patient taking differently: Take 1,000 mg by mouth every 6 (six) hours as needed.       )   ? doxazosin (CARDURA) 4 MG tablet Take 1 tablet (4 mg total) by mouth daily.   ? ferrous sulfate 325 (65 FE) MG tablet Take 1 tablet by mouth 2 (two) times a day.   ? fluticasone (FLONASE) 50 mcg/actuation nasal spray 1 spray into each nostril daily.   ? levETIRAcetam (KEPPRA) 500 MG tablet Take 1 tablet (500 mg total) by mouth 2 (two) times a day.   ? metoprolol tartrate (LOPRESSOR) 25 MG tablet Take 1 tablet (25 mg total) by mouth daily.   ? multivitamin therapeutic tablet Take 1 tablet by mouth daily.   ? pantoprazole (PROTONIX) 40 MG tablet Take 1 tablet (40 mg total) by mouth daily.   ? senna-docusate (PERICOLACE) 8.6-50 mg tablet Take 1 tablet by mouth 2 (two) times a day.   ? tamsulosin  (FLOMAX) 0.4 mg cap Take 0.4 mg by mouth.     Past Medical History:    No past medical history on file.        PHYSICAL EXAMINATION  Vitals:    02/27/19 2049   BP: 142/66   Pulse: 77   Resp: 12   Temp: 96.7  F (35.9  C)   SpO2: 98%   Weight: 166 lb (75.3 kg)       Today on physical exam:     GENERAL: Awake, Alert, oriented x3, not in any form of acute distress, answers questions appropriately, follows simple commands, conversant  HEENT: Head is normocephalic with normal hair distribution. No evidence of trauma. Ears: No acute purulent discharge. Eyes: Conjunctivae pink with no scleral jaundice. Nose: Normal mucosa and septum. NECK: Supple with no cervical or supraclavicular lymphadenopathy. Trachea is midline. glasses  CHEST: No tenderness or deformity, no crepitus  LUNG: Clear to auscultation with good chest expansion. There are no crackles or wheezes, normal AP diameter.  BACK: No kyphosis of the thoracic spine. Symmetric, no curvature, ROM normal, no CVA tenderness, no spinal tenderness   CVS: irregularly irregular rhythm, systolic murmur, no rubs, gallops, or heaves,  2+ pulses symmetric in all extremities.  ABDOMEN: Rounded and soft, nontender to palpation, non distended, no masses, no organomegaly, good bowel sounds, no rebound or guarding, no peritoneal signs.   EXTREMITIES: 1+ LLE pedal edema, Left hip two incisions healed, Left hip pain and tenderness with ROM, trace rle edema  SKIN: Warm and dry,   NEUROLOGICAL: The patient is oriented to person, place and time but is forgetful at times. No movement disorder, no arm drift, visual changes, no facial droop, unilateral weakness, mild numbness and tingling in left leg.             LABS:   No results found for this or any previous visit (from the past 168 hour(s)).  Results for orders placed or performed in visit on 02/05/19   Basic Metabolic Panel   Result Value Ref Range    Sodium 138 136 - 145 mmol/L    Potassium 3.8 3.5 - 5.0 mmol/L    Chloride 104 98 - 107  mmol/L    CO2 26 22 - 31 mmol/L    Anion Gap, Calculation 8 5 - 18 mmol/L    Glucose 87 70 - 125 mg/dL    Calcium 8.7 8.5 - 10.5 mg/dL    BUN 20 8 - 28 mg/dL    Creatinine 0.97 0.70 - 1.30 mg/dL    GFR MDRD Af Amer >60 >60 mL/min/1.73m2    GFR MDRD Non Af Amer >60 >60 mL/min/1.73m2         Lab Results   Component Value Date    WBC 9.1 02/19/2019    HGB 9.6 (L) 02/19/2019    HCT 31.9 (L) 02/19/2019    MCV 92 02/19/2019     02/19/2019       Lab Results   Component Value Date    [id number] 637 01/28/2019     No results found for: HGBA1C  Lab Results   Component Value Date    INR 1.18 (H) 01/26/2019    INR 1.27 (H) 07/23/2018    INR 1.21 (H) 05/16/2018     No results found for: [id number]  Lab Results   Component Value Date    TSH 3.19 01/28/2019           ASSESSMENT/PLAN:    1. Subdural hematoma, fall: No headaches, visual changes, no neuro deficits noted. Neuro checks daily. F/u with Neurosurgery in 2 weeks with head CT, scheduled for 2/11-apparently missed CT appt, now rescheduled for 3/1. Continue off aspirin and continue keppra for seizure prophylaxis until then. No recent neuro concerns. Mental status appears at baseline though.   2. Left hip fracture, s/p ORIF: Incisions c/d/i, 1+ edema LLE. F/u with ortho in 2 weeks-no appt scheduled, needs f/u appt. tevin hose. Ice prn. PT, OT.  Stopped oxycodone as pain improved. Pain controlled on tylenol prn only now.   3. Moderate-severe AS: Was being evaluated for LAAO and TAVR procedure prior to hospital stay. Will need to f/u with cardiology once more stable.   4. BPH, straight cath: On doxazosin and added flomax  straight cath four times a day as doing at home and f/u with urology after discharge. No recent concerns, staff assisting patient in doing.   5. A fibrillation: Rate controlled in 70s. On metoprolol. hold parameters. No anticoagulants, antiplatelets until cleared per neurosurgery.   6. Macdonald's esophagus, h/o GI bleed: On pantoprazole. No current  issues.   7. Iron deficiency anemia, acute blood loss anemia: Follows with hem/onc for weekly/biweekly hg levels, blood transfusions, iron infusions. On folic acid. Hg 8 on 1/29. 8.7 on 2/5. HG 8.5 on 2/11, stable. Appt with hematology and infusion clinic on 2/19-iron and transferrin, ferritin stable no need for iv infusion of iron. Hg improved to 9.6. Recommended iron two times a day so this was restarted. F/u again on 4/2. Recommended gi follow up.   8. CKD stage 3: Cr level 1.03 on 1/30. Cr 0.97 on 2/5. Stable.   9. UTI: completed keflex.   10. Allergic rhinitis: On flonase.   11. Constipation: On senna docusate two times a day. BM nearly every day.       Per therapy soft LCD 3/11 sbaA for toileting and dressing, ambulates 50 feet with walker and cga, 11 on slums, 4.2 on cpt.  PT, OT, HHA.     Electronically signed by: Homa Smyth NP    Time spent in interview and examination of patient, review of available records, and discussion with nursing staff. Continue care plan, efforts at therapy, and monitor nutritional status.

## 2021-06-18 NOTE — LETTER
Letter by Homa Smyth NP at      Author: Homa Smyth NP Service: -- Author Type: --    Filed:  Encounter Date: 3/8/2019 Status: (Other)         Patient: Lopez Mcdermott   MR Number: [id number]   YOB: 1929   Date of Visit: 3/8/2019                 [facility]    DATE: 3/8/2019    NAME:  Lopez Mcdermott             :  1929  MRN: [id number]  CODE STATUS:  FULL CODE    VISIT TYPE: Review Of Multiple Medical Conditions     FACILITY:  [facility] [[id number]]       CHIEF COMPLAIN/REASON FOR VISIT:    Chief Complaint   Patient presents with   ? Review Of Multiple Medical Conditions               HISTORY OF PRESENT ILLNESS: Lopez Mcdermott is a 89 y.o. male who was admitted - for fall and found to have subdural hematoma on CT and Left comminuted intertrochanteric hip fracture. Neurosurgery was consulted and recommended conservative management. He was monitored in ICU and moved to general floor. His blood pressure was optimized <150. Neurology was consulted for possible seizure activity and recommended keppra. He underwent ORIF of Left hip fracture on . He was noted to have mod to severe AS and cardiology was consulted and recommended outpatient eval for TAVR procedure. He was also treated for UTI and completed 7 days of keflex. He was transferred to [facility] for further rehab. He has PMH of moderate to severe AS, BPH, anemia, CKD stage 3, A fib, gupta esophagus. Prior to this he lived at home with his son and daughter in law.     Today Mr. Mcdermott is seen for review of systems. He is seen in his bed and notes that he had a little hip pain overnight but overall his pain has bee much improved. He says he has no concerns with his incision and thinks it is all healed up. His bowels are still working fine and appetite is good. No recent dizziness or nausea. Therapy is going well and he says they gave him a discharge date for next week. He  plans to return home. He is not sure if they have a follow up with ortho for him. He denies any other concerns today. Per staff vitals have been stable and no recent complaints of pain. He does have an appt scheduled with ortho for 3/13. He will be discharging on 3/12. Therapy is recommending 24 hour supervision due to cognitive concerns so he will be having a care conference with family to discuss discharge planning.     REVIEW OF SYSTEMS:  PROBLEMS AND REVIEW OF SYSTEMS:   Review of Systems  Today on ROS:   Currently, no fever, chills, or rigors. Decreased vision and hearing. Denies any chest pain, palpitations, lightheadedness, dizziness. Appetite is good. Denies any GERD symptoms. Denies any difficulty with swallowing, nausea, or vomiting.  Denies any abdominal pain, diarrhea or constipation. No active bleeding. No rash. Positive for left hip incisions healed, left hip pain minimal, no visual changes, headaches, no recent shortness of breath, no further swelling in left leg, no numbness left leg today, urinary retention-straight cath 4 times daily      No Known Allergies  Current Outpatient Medications   Medication Sig   ? acetaminophen (TYLENOL) 500 MG tablet Take 2 tablets (1,000 mg total) by mouth 3 (three) times a day. (Patient taking differently: Take 1,000 mg by mouth every 6 (six) hours as needed.       )   ? doxazosin (CARDURA) 4 MG tablet Take 1 tablet (4 mg total) by mouth daily.   ? ferrous sulfate 325 (65 FE) MG tablet Take 1 tablet by mouth 2 (two) times a day.   ? fluticasone (FLONASE) 50 mcg/actuation nasal spray 1 spray into each nostril daily.   ? levETIRAcetam (KEPPRA) 500 MG tablet Take 1 tablet (500 mg total) by mouth 2 (two) times a day.   ? metoprolol tartrate (LOPRESSOR) 25 MG tablet Take 1 tablet (25 mg total) by mouth daily.   ? multivitamin therapeutic tablet Take 1 tablet by mouth daily.   ? pantoprazole (PROTONIX) 40 MG tablet Take 1 tablet (40 mg total) by mouth daily.   ?  senna-docusate (PERICOLACE) 8.6-50 mg tablet Take 1 tablet by mouth 2 (two) times a day.   ? tamsulosin (FLOMAX) 0.4 mg cap Take 0.4 mg by mouth.     Past Medical History:    No past medical history on file.        PHYSICAL EXAMINATION  Vitals:    03/07/19 2102   BP: 123/54   Pulse: 81   Resp: 18   Temp: (!) 96  F (35.6  C)   SpO2: 97%   Weight: 169 lb (76.7 kg)       Today on physical exam:     GENERAL: Awake, Alert, oriented x3, not in any form of acute distress, answers questions appropriately, follows simple commands, conversant  HEENT: Head is normocephalic with normal hair distribution. No evidence of trauma. Ears: No acute purulent discharge. Eyes: Conjunctivae pink with no scleral jaundice. Nose: Normal mucosa and septum. NECK: Supple with no cervical or supraclavicular lymphadenopathy. Trachea is midline. glasses  CHEST: No tenderness or deformity, no crepitus  LUNG: Clear to auscultation with good chest expansion. There are no crackles or wheezes, normal AP diameter.  BACK: No kyphosis of the thoracic spine. Symmetric, no curvature, ROM normal, no CVA tenderness, no spinal tenderness   CVS: irregularly irregular rhythm, systolic murmur, no rubs, gallops, or heaves,  2+ pulses symmetric in all extremities.  ABDOMEN: Rounded and soft, nontender to palpation, non distended, no masses, no organomegaly, good bowel sounds, no rebound or guarding, no peritoneal signs.   EXTREMITIES: no LLE pedal edema, Left hip two incisions healed, minimal Left hip pain and tenderness with ROM, no rle edema  SKIN: Warm and dry,   NEUROLOGICAL: The patient is oriented to person, place and time but is forgetful at times. No movement disorder, no arm drift, visual changes, no facial droop, unilateral weakness, no numbness and tingling in left leg today            LABS:   No results found for this or any previous visit (from the past 168 hour(s)).  Results for orders placed or performed in visit on 02/05/19   Basic Metabolic Panel    Result Value Ref Range    Sodium 138 136 - 145 mmol/L    Potassium 3.8 3.5 - 5.0 mmol/L    Chloride 104 98 - 107 mmol/L    CO2 26 22 - 31 mmol/L    Anion Gap, Calculation 8 5 - 18 mmol/L    Glucose 87 70 - 125 mg/dL    Calcium 8.7 8.5 - 10.5 mg/dL    BUN 20 8 - 28 mg/dL    Creatinine 0.97 0.70 - 1.30 mg/dL    GFR MDRD Af Amer >60 >60 mL/min/1.73m2    GFR MDRD Non Af Amer >60 >60 mL/min/1.73m2         Lab Results   Component Value Date    WBC 9.1 02/19/2019    HGB 9.6 (L) 02/19/2019    HCT 31.9 (L) 02/19/2019    MCV 92 02/19/2019     02/19/2019       Lab Results   Component Value Date    CTCSWFQO79 637 01/28/2019     No results found for: HGBA1C  Lab Results   Component Value Date    INR 1.18 (H) 01/26/2019    INR 1.27 (H) 07/23/2018    INR 1.21 (H) 05/16/2018     No results found for: [id number]  Lab Results   Component Value Date    TSH 3.19 01/28/2019           ASSESSMENT/PLAN:    1. Subdural hematoma, fall: No headaches, visual changes, no neuro deficits noted. Neuro checks daily. F/u with Neurosurgery in 2 weeks with head CT, scheduled for 2/11-apparently missed CT appt, now rescheduled for 3/1-stopped keppra, increase activity as tolerated, avoid falls, f/u prn.  No recent neuro concerns. CT showed interval resolution of left frontoparietal low attenuation subdural hematoma, interval resolution of small amt of intraventricular hemorrhage left occipital horn, mod generalized volume loss and chronic ischemic changes.   2. Left hip fracture, s/p ORIF: Incisions c/d/i, 1+ edema LLE. F/u with ortho on 3/13. tevin hose. Ice prn. PT, OT.  Pain controlled tylenol prn    3. Moderate-severe AS: Was being evaluated for LAAO and TAVR procedure prior to hospital stay. Will need to f/u with cardiology once more stable.   4. BPH, straight cath: On doxazosin and added flomax  straight cath four times a day as doing at home and f/u with urology after discharge. No recent concerns, staff assisting patient in doing.   5.  A fibrillation: Rate controlled in 80s. On metoprolol. hold parameters. No anticoagulants, antiplatelets until cleared per neurosurgery-reviewed 3/1 note and Ct showed interval resolution of hemorrhage. continue off antiplatelets, anticoagulants due to anemia and recent SDH for now. F/u with cardiology regarding.   6. Macdonald's esophagus, h/o GI bleed: On pantoprazole. No current issues.   7. Iron deficiency anemia, acute blood loss anemia: Follows with hem/onc for weekly/biweekly hg levels, blood transfusions, iron infusions. On folic acid. Hg 8 on 1/29. 8.7 on 2/5. HG 8.5 on 2/11, stable. Appt with hematology and infusion clinic on 2/19-iron and transferrin, ferritin stable no need for iv infusion of iron. Hg improved to 9.6. Recommended iron two times a day so this was restarted. F/u again on 4/2. Recommended gi follow up.   8. CKD stage 3: Cr level 1.03 on 1/30. Cr 0.97 on 2/5. Stable.   9. UTI: completed keflex.   10. Allergic rhinitis: On flonase.   11. Constipation: On senna docusate two times a day. BM nearly every day.   12. Cognitive impairment: Only scored 11/30 on slums and 4.2 on cpt. Speech therapy was working on cognition and now completed. Therapy recommending 24 hour supervision for safety. A/o but forgetful.     Per therapy firm LCD 3/11 sba for toileting and dressing, ambulates 75 feet with walker and cga, 11 on slums, 4.2 on cpt.  PT, OT, HHA. Recommending 24 hour supervision due to cognition.     Electronically signed by: Homa Smyth NP

## 2021-06-19 NOTE — LETTER
Letter by Inocente Colby MD at      Author: Inocente Colby MD Service: -- Author Type: --    Filed:  Encounter Date: 8/6/2019 Status: (Other)        Lopez Mcdermott  [address]  [place] [address]             August 6, 2019         Dear Lopez Mcdermott,    Our records show that you are due for you Annual Wellness Visit please call and get this scheduled with your provider, you can call [phone] or use [facility]. If you are being seen elsewhere please let us know.         Please call with questions or contact us using [facility].    Sincerely,        Electronically signed by Inocente Colby MD

## 2021-06-19 NOTE — PROGRESS NOTES
Pt came into infusion center for IV Iron as ordered. Pt tolerated this well and left infusion clinic via ambulatory. Pt will RTC as sched.

## 2021-06-19 NOTE — LETTER
Letter by Inocente Colby MD at      Author: Inocente Colby MD Service: -- Author Type: --    Filed:  Encounter Date: 8/14/2019 Status: (Other)         Lopez Mcdermott  [address]  [place] [address]             August 14, 2019         Dear  Baldemar,    According to our records, it is the time of year for your annual exam.  Pleas use my chart or call the clinic at [phone] and schedule an appointment with your provider.    Please call with questions or contact us using [facility].    Sincerely,        Electronically signed by Inocente Colby MD

## 2021-06-19 NOTE — PROGRESS NOTES
ASSESSMENT:  1. Hospital discharge follow-up  2. Iron deficiency anemia due to chronic blood loss  Hemoglobin of 7.6 today.  Referral to infusion therapy ordered.  Contact infusion clinic for infusion tomorrow, 8/2/2018  - HM2(CBC w/o Differential)  - Ambulatory referral to Infusion Therapy    3. Cough  Cough was evaluated while patient was in the hospital.  Chest x-ray was negative for pneumonia.  I recommend the patient use OTC cough syrup and antihistamines.  If cough persists he should return to the clinic for reevaluation.  Patient and son verbalized understanding    I have reconciled all medications.     PLAN:  Patient Instructions     Continue taking ferrous sulfate 325 mg 3 times daily and the rest of your medication  Follow up with gastroenterologist as discussed  We will check your blood level today and determine when to start iron infusion. We will call you with the result.  Follow up with your new PCP in two weeks.    Call MD for extreme weakness,       Call MD for:  difficulty breathing, headache or visual disturbances       Call MD for:  temperature greater than 101       Call MD for:  severe uncontrolled pain       Call MD for:  constipation (difficulty having a bowel movement)       Call MD for:  dizziness, persistent nausea or vomiting       Call MD for:  weight gain - more than two pounds in a day or five pounds in a week           Orders Placed This Encounter   Procedures     HM2(CBC w/o Differential)     Ambulatory referral to Infusion Therapy     Referral Priority:   Routine     Referral Type:   Infusion     Referral Reason:   Specialty Services Required     Number of Visits Requested:   1     There are no discontinued medications.    No Follow-up on file.    CHIEF COMPLAINT:  Chief Complaint   Patient presents with     Hospital Visit Follow Up     NPT- anemia , feeling weak and fatigue, dizzy at times        HISTORY OF PRESENT ILLNESS:  Lopez is a 89 y.o. male presenting to the clinic today for  "a hospital follow up. he was admitted on July 23 for weakness, fatigue, dizziness which is due to anemia.  Hemoglobin was 6.2 at admission.  Patient received 2 units of blood while in the hospital.  Hemoglobin rise to 8.0.  Before discharge her hemoglobin went up to 7.6.  Patient reported that he has not felt any better since discharge.  He stated that he still weak, dizzy at times and still looks pale.  Patient reported that in the past he received iron infusion every 2 weeks which really helped him to maintain his energy level.  Patient reported that he does not have any black stool or dark stool.  He has not had any diarrhea since leaving the hospital.  Patient reported that he still coughing.  Patient denies chest pain, shortness of breath, fever and chills.      REVIEW OF SYSTEMS:   All other systems are negative.    No Known Allergies    TOBACCO USE:  History   Smoking Status     Former Smoker     Types: Cigarettes     Quit date: 5/14/1987   Smokeless Tobacco     Never Used       VITALS:  Vitals:    08/01/18 1457   BP: 100/72   Pulse: 68   Weight: 180 lb (81.6 kg)   Height: 5' 9\" (1.753 m)     Wt Readings from Last 3 Encounters:   08/01/18 180 lb (81.6 kg)   07/25/18 177 lb 12.8 oz (80.6 kg)   05/15/18 173 lb (78.5 kg)     Body mass index is 26.58 kg/(m^2).    PHYSICAL EXAM:  Physical Examination: General appearance - alert, well appearing, and in no distress  Eyes: pupils equal and reactive, extraocular eye movements intact  Mouth: mucous membranes moist, pharynx normal without lesions  Neck: supple, no significant adenopathy  Lymphatics: no palpable lymphadenopathy  Chest: clear to auscultation, no wheezes, rales or rhonchi, symmetric air entry  Heart: normal rate, regular rhythm, normal S1, S2, no murmurs, rubs, clicks or gallops  Abdomen: soft, nontender, nondistended, no masses or organomegaly  Neurological: alert, oriented, normal speech, no focal findings or movement disorder noted  Extremities: No " edema, no clubbing or cyanosis  Psychiatric: Normal affect. Does not appear anxious or depressed.      MEDICATIONS:  Current Outpatient Prescriptions   Medication Sig Dispense Refill     acetaminophen (TYLENOL) 500 MG tablet Take 500 mg by mouth daily.        doxazosin (CARDURA) 4 MG tablet Take 1 tablet (4 mg total) by mouth daily. 30 tablet 0     ferrous sulfate 325 (65 FE) MG tablet Take 1 tablet (325 mg total) by mouth 3 (three) times a day with meals. 90 tablet 0     fluticasone (FLONASE) 50 mcg/actuation nasal spray 1 spray into each nostril daily.       metoprolol tartrate (LOPRESSOR) 25 MG tablet Take 1 tablet (25 mg total) by mouth daily. 30 tablet 0     pantoprazole (PROTONIX) 40 MG tablet Take 1 tablet (40 mg total) by mouth daily. 30 tablet 0     No current facility-administered medications for this visit.

## 2021-06-19 NOTE — PROGRESS NOTES
Patient ambulated into Infusion Care accompanied by his son. Patient is alert and oriented and VSS. Patient had a skin tear on the back of his left hand and was bleeding. Area cleansed with Normal Saliine and a piece of Tegadrem dressing placed on skin tear. Dry gauze pressure dressing secured with Coban. Peripheral IV started in in left forearm. Patient received Venofer IV push over 5 minutes. Peripheral IV was flushed with Normal Saline and discontinued.All questions answered and patient will return next week.

## 2021-06-19 NOTE — PROGRESS NOTES
"1st of 10 infusions per pharmacy.  Pt hgb 7.6   Per son  \"maybe capillary bleeds and not able to produce hemoglobin\"  Pt stated that he had infusions at [facility] and feels better after them.  No c/o's with infusion. Written and verbal med information given to pt and son.  "

## 2021-06-19 NOTE — LETTER
Letter by Homa Smyth NP at      Author: Homa Smyth NP Service: -- Author Type: --    Filed:  Encounter Date: 3/19/2019 Status: (Other)         Patient: Lopez Mcdermott   MR Number: [id number]   YOB: 1929   Date of Visit: 3/19/2019                 [facility]    DATE: 3/19/2019    NAME:  Lopez Mcdermott             :  1929  MRN: [id number]  CODE STATUS:  FULL CODE    VISIT TYPE: Discharge Summary     FACILITY:  [facility] [[id number]]       CHIEF COMPLAIN/REASON FOR VISIT:    Chief Complaint   Patient presents with   ? Discharge Summary               HISTORY OF PRESENT ILLNESS: Lopez Mcdermott is a 89 y.o. male who was admitted - for fall and found to have subdural hematoma on CT and Left comminuted intertrochanteric hip fracture. Neurosurgery was consulted and recommended conservative management. He was monitored in ICU and moved to general floor. His blood pressure was optimized <150. Neurology was consulted for possible seizure activity and recommended keppra. He underwent ORIF of Left hip fracture on . He was noted to have mod to severe AS and cardiology was consulted and recommended outpatient eval for TAVR procedure. He was also treated for UTI and completed 7 days of keflex. [facility] was [facility]. He has PMH of moderate to severe AS, BPH, anemia, CKD stage 3, A fib, gupta esophagus. Prior to this he lived at home with his son and daughter in law.     TCU course:   Mr. Mcdermott made progress with therapy and was ambulating up to 75 feet with walker and cga. He was stand by assist for adls. He was assist of 1 for transfers, EZ stand at times. He scored 11 on slums and 4.2 on cpt. He completed therapy on 3/11 and has been private pay since then. During his stay his neuro status was stable and he had f/u with neurosurgery on 3/1 and keppra was stopped at that time. CT showed interval resolution of  left frontoparietal subdural hematoma with interval resolution of small intraventricular hemorrhage. His hip incision has healed and he is no longer complaining of pain. He had f/u with ortho on 3/13 but no notes or orders were received from that appt even after multiple requests. He has been weaned off all pain meds. He has been straight cathing 4 times a day without concern and does still void some on own. He was started on flomax. His anemia is being managed per hematology and had appt last on 2/19. He was previously on iron infusions but is now on iron supplement and will f/u next on 4/2. He was recommended to f/u with GI and has not had this appt yet. His hg was stable 8-9.6 during stay. He was treated for UTI with keflex. Therapy recommended 24 hour care due to cognitive impairment and mobility assist. Family has found placement with the [facility] where he will be provided with 24 hour care in a small group home for men. He will have HH PT, OT, HHA, RN. He will need to f/u with PCP in 5-7 days.         REVIEW OF SYSTEMS:  PROBLEMS AND REVIEW OF SYSTEMS:   Review of Systems  Today on ROS:   Currently, no fever, chills, or rigors. Decreased vision and hearing. Denies any chest pain, palpitations, lightheadedness, dizziness. Appetite is good. Denies any GERD symptoms. Denies any difficulty with swallowing, nausea, or vomiting.  Denies any abdominal pain, diarrhea or constipation. No active bleeding. No rash. Positive for left hip incisions healed, no visual changes, no headaches, no recent shortness of breath, urinary retention-straight cath 4 times daily, no pain recently      No Known Allergies  Current Outpatient Medications   Medication Sig   ? acetaminophen (TYLENOL) 500 MG tablet Take 2 tablets (1,000 mg total) by mouth 3 (three) times a day. (Patient taking differently: Take 1,000 mg by mouth every 6 (six) hours as needed.       )   ? doxazosin (CARDURA) 4 MG tablet Take 1 tablet (4 mg total) by mouth  daily.   ? ferrous sulfate 325 (65 FE) MG tablet Take 1 tablet by mouth 2 (two) times a day.   ? fluticasone (FLONASE) 50 mcg/actuation nasal spray 1 spray into each nostril daily.   ? levETIRAcetam (KEPPRA) 500 MG tablet Take 1 tablet (500 mg total) by mouth 2 (two) times a day.   ? metoprolol tartrate (LOPRESSOR) 25 MG tablet Take 1 tablet (25 mg total) by mouth daily.   ? multivitamin therapeutic tablet Take 1 tablet by mouth daily.   ? pantoprazole (PROTONIX) 40 MG tablet Take 1 tablet (40 mg total) by mouth daily.   ? senna-docusate (PERICOLACE) 8.6-50 mg tablet Take 1 tablet by mouth 2 (two) times a day.   ? tamsulosin (FLOMAX) 0.4 mg cap Take 0.4 mg by mouth.     Past Medical History:    No past medical history on file.        PHYSICAL EXAMINATION  Vitals:    03/18/19 1613   BP: 130/71   Pulse: 75   Resp: 18   Temp: (!) 96.4  F (35.8  C)   SpO2: 96%   Weight: 172 lb (78 kg)       Today on physical exam:     GENERAL: Awake, Alert, oriented x3, not in any form of acute distress, answers questions appropriately, follows simple commands, conversant  HEENT: Head is normocephalic with normal hair distribution. No evidence of trauma. Ears: No acute purulent discharge. Eyes: Conjunctivae pink with no scleral jaundice. Nose: Normal mucosa and septum. NECK: Supple with no cervical or supraclavicular lymphadenopathy. Trachea is midline. glasses  CHEST: No tenderness or deformity, no crepitus  LUNG: Clear to auscultation with good chest expansion. There are no crackles or wheezes, normal AP diameter.  BACK: No kyphosis of the thoracic spine. Symmetric, no curvature, ROM normal, no CVA tenderness, no spinal tenderness   CVS: irregularly irregular rhythm, systolic murmur, no rubs, gallops, or heaves,  2+ pulses symmetric in all extremities.  ABDOMEN: Rounded and soft, nontender to palpation, non distended, no masses, no organomegaly, good bowel sounds, no rebound or guarding, no peritoneal signs.   EXTREMITIES: no bLE  pedal edema, Left hip two incisions healed,   SKIN: Warm and dry,   NEUROLOGICAL: The patient is oriented to person, place and time but is forgetful at times. No movement disorder, no arm drift, visual changes, no facial droop, unilateral weakness, no numbness and tingling in left leg today            LABS:   No results found for this or any previous visit (from the past 168 hour(s)).  Results for orders placed or performed in visit on 02/05/19   Basic Metabolic Panel   Result Value Ref Range    Sodium 138 136 - 145 mmol/L    Potassium 3.8 3.5 - 5.0 mmol/L    Chloride 104 98 - 107 mmol/L    CO2 26 22 - 31 mmol/L    Anion Gap, Calculation 8 5 - 18 mmol/L    Glucose 87 70 - 125 mg/dL    Calcium 8.7 8.5 - 10.5 mg/dL    BUN 20 8 - 28 mg/dL    Creatinine 0.97 0.70 - 1.30 mg/dL    GFR MDRD Af Amer >60 >60 mL/min/1.73m2    GFR MDRD Non Af Amer >60 >60 mL/min/1.73m2         Lab Results   Component Value Date    WBC 9.1 02/19/2019    HGB 9.6 (L) 02/19/2019    HCT 31.9 (L) 02/19/2019    MCV 92 02/19/2019     02/19/2019       Lab Results   Component Value Date    [id number] 637 01/28/2019     No results found for: HGBA1C  Lab Results   Component Value Date    INR 1.18 (H) 01/26/2019    INR 1.27 (H) 07/23/2018    INR 1.21 (H) 05/16/2018     No results found for: [id number]  Lab Results   Component Value Date    TSH 3.19 01/28/2019           ASSESSMENT/PLAN:    1. Subdural hematoma, fall: resolved.   2. Left hip fracture, s/p ORIF: Incisions c/d/i, 1+ edema LLE. F/u with ortho on 3/13-no notes or orders available, requested records multiple times, unsure if needs follow up but appears doing well. tevin hose. Ice prn. PT, OT. No pain recently.   3. Moderate-severe AS: Was being evaluated for LAAO and TAVR procedure prior to hospital stay. Will need to f/u with cardiology once more stable.   4. BPH, straight cath: On doxazosin, flomax  straight cath four times a day as doing at home and f/u with urology after discharge. No  recent concerns, staff assisting patient in doing.   5. A fibrillation: Rate controlled in 70s. On metoprolol. hold parameters.. F/u with cardiology.   6. Macdonald's esophagus, h/o GI bleed: On pantoprazole. No current issues.   7. Iron deficiency anemia, acute blood loss anemia: Follows with hem/onc for weekly/biweekly hg levels, blood transfusions, iron infusions. On folic acid. Hg 8 on 1/29. 8.7 on 2/5. HG 8.5 on 2/11, stable. Appt with hematology and infusion clinic on 2/19-iron and transferrin, ferritin stable no need for iv infusion of iron. Hg improved to 9.6. F/u again on 4/2. Recommended gi follow up.   8. CKD stage 3: Cr level 1.03 on 1/30. Cr 0.97 on 2/5. Stable.   9.  Cognitive impairment: Only scored 11/30 on slums and 4.2 on cpt. Speech therapy was working on cognition and now completed. Therapy recommending 24 hour supervision for safety. A/o but forgetful.   10. Allergic rhinitis: On flonase.   11. Constipation: On senna docusate two times a day. No concerns.         Electronically signed by: Homa Smyth NP    Total floor/unit time 35 min, 25 min of which spent on counseling and coordination of care. Discussed med changes, lab results, need for primary care follow up and home health therapy. Discussed higher level of care, importance of med compliance, and need for cardiology follow up. Educated on anemia and need for gi follow up. Coordinated care with therapy, nursing,  for discharge planning and services for discharge. Coordinated care with nursing for medications and scripts for discharge.      Please evaluate Lopez Mcdermott for admission to Home Health.    Face to Face Attestation and Initial Plan of Care    The face-to-face encounter occurred on date: 3/19/19  Face to Face encounter was with: Homa Smyth    Please provide brief clinical summary of reason for visit and need for home care. Deconditioning after hospital stay for fall, subdural hematoma, left hip  "fracture    Please identify which of the following home health disciplines the patient will need AND describe the skilled services that you would like the home health agency to perform: SKILLED NURSING (RN): complex med management and Other straight cathing, urinary retention, PHYSICAL THERAPY: strength training and gait training, OCCUPATIONAL THERAPY: ADLs and home safety and HOME HEALTH AIDE    Homebound Status (describe the functional limitations that support this patient is confined to his/her home. Medicaid recipients are not required to be homebound.):assistive device needed:  2WW and Wheelchair    Name of physician who will be responsible for the ongoing home health plan of care (CMS requires the referring physician to provide the specific name of the community physician instead of a title, such as \"PCP\"): Inocente Colby MD    Requested Start of Care Date: Within 48 hours    Other information to assist the home health agency in developing the initial Plan of Care:    I certify that services are/were furnished while this patient was under the care of a physician and that a physician or an allowed non-physician practitioner (NPP), had a face-to-face encounter that meets the physician face-to-face encounter requirements. The encounter was in whole, or in part, related to the primary reason for home health. The patient is confined to his/her home and needs intermittent skilled nursing, physical therapy, speech-language pathology, or the continued need for occupational therapy. A plan of care has been established by a physician and is periodically reviewed by a physician.                              "

## 2021-06-19 NOTE — LETTER
Letter by Homa Smyth NP at      Author: Homa Smyth NP Service: -- Author Type: --    Filed:  Encounter Date: 3/14/2019 Status: (Other)         Patient: Lopez Mcdermott   MR Number: [id number]   YOB: 1929   Date of Visit: 3/14/2019                 [facility]    DATE: 3/14/2019    NAME:  Lopez Mcdermott             :  1929  MRN: [id number]  CODE STATUS:  FULL CODE    VISIT TYPE: Review Of Multiple Medical Conditions     FACILITY:  [facility] [[id number]]       CHIEF COMPLAIN/REASON FOR VISIT:    Chief Complaint   Patient presents with   ? Review Of Multiple Medical Conditions               HISTORY OF PRESENT ILLNESS: Lopez Mcdermott is a 89 y.o. male who was admitted - for fall and found to have subdural hematoma on CT and Left comminuted intertrochanteric hip fracture. Neurosurgery was consulted and recommended conservative management. He was monitored in ICU and moved to general floor. His blood pressure was optimized <150. Neurology was consulted for possible seizure activity and recommended keppra. He underwent ORIF of Left hip fracture on . He was noted to have mod to severe AS and cardiology was consulted and recommended outpatient eval for TAVR procedure. He was also treated for UTI and completed 7 days of keflex. He was transferred to [facility] for further rehab. He has PMH of moderate to severe AS, BPH, anemia, CKD stage 3, A fib, gupta esophagus. Prior to this he lived at home with his son and daughter in law.     Today Mr. Mcdermott is seen for follow up and review of systems today. He reports that he needs to get up to the bathroom. He is seen sitting on edge of bed with depends ripped off and and trying to get up on own. His alarms are going off. He says he needs to go to the bathroom and doesn't need any help. He was assisted back to bed until staff were able to come and assist him. He says he is having some pain in  his hip at times but otherwise he is doing fine. He denies any shortness of breath or concerns. He denies any dizziness, lightheadedness, headaches, nausea, stomach upset, or issues with bowels recently. Per staff still waiting long term care placement.     REVIEW OF SYSTEMS:  PROBLEMS AND REVIEW OF SYSTEMS:   Review of Systems  Today on ROS:   Currently, no fever, chills, or rigors. Decreased vision and hearing. Denies any chest pain, palpitations, lightheadedness, dizziness. Appetite is good. Denies any GERD symptoms. Denies any difficulty with swallowing, nausea, or vomiting.  Denies any abdominal pain, diarrhea or constipation. No active bleeding. No rash. Positive for left hip incisions healed, left hip pain minimal, no visual changes, headaches, no recent shortness of breath, no further swelling in left leg, no numbness left leg today, urinary retention-straight cath 4 times daily      No Known Allergies  Current Outpatient Medications   Medication Sig   ? acetaminophen (TYLENOL) 500 MG tablet Take 2 tablets (1,000 mg total) by mouth 3 (three) times a day. (Patient taking differently: Take 1,000 mg by mouth every 6 (six) hours as needed.       )   ? doxazosin (CARDURA) 4 MG tablet Take 1 tablet (4 mg total) by mouth daily.   ? ferrous sulfate 325 (65 FE) MG tablet Take 1 tablet by mouth 2 (two) times a day.   ? fluticasone (FLONASE) 50 mcg/actuation nasal spray 1 spray into each nostril daily.   ? levETIRAcetam (KEPPRA) 500 MG tablet Take 1 tablet (500 mg total) by mouth 2 (two) times a day.   ? metoprolol tartrate (LOPRESSOR) 25 MG tablet Take 1 tablet (25 mg total) by mouth daily.   ? multivitamin therapeutic tablet Take 1 tablet by mouth daily.   ? pantoprazole (PROTONIX) 40 MG tablet Take 1 tablet (40 mg total) by mouth daily.   ? senna-docusate (PERICOLACE) 8.6-50 mg tablet Take 1 tablet by mouth 2 (two) times a day.   ? tamsulosin (FLOMAX) 0.4 mg cap Take 0.4 mg by mouth.     Past Medical History:    No  past medical history on file.        PHYSICAL EXAMINATION  Vitals:    03/13/19 2205   BP: 133/57   Pulse: 73   Resp: 18   Temp: (!) 96  F (35.6  C)   SpO2: 96%   Weight: 168 lb (76.2 kg)       Today on physical exam:     GENERAL: Awake, Alert, oriented x3, not in any form of acute distress, answers questions appropriately, follows simple commands, conversant  HEENT: Head is normocephalic with normal hair distribution. No evidence of trauma. Ears: No acute purulent discharge. Eyes: Conjunctivae pink with no scleral jaundice. Nose: Normal mucosa and septum. NECK: Supple with no cervical or supraclavicular lymphadenopathy. Trachea is midline. glasses  CHEST: No tenderness or deformity, no crepitus  LUNG: Clear to auscultation with good chest expansion. There are no crackles or wheezes, normal AP diameter.  BACK: No kyphosis of the thoracic spine. Symmetric, no curvature, ROM normal, no CVA tenderness, no spinal tenderness   CVS: irregularly irregular rhythm, systolic murmur, no rubs, gallops, or heaves,  2+ pulses symmetric in all extremities.  ABDOMEN: Rounded and soft, nontender to palpation, non distended, no masses, no organomegaly, good bowel sounds, no rebound or guarding, no peritoneal signs.   EXTREMITIES: no LLE pedal edema, Left hip two incisions healed, minimal Left hip pain and tenderness with ROM, no rle edema  SKIN: Warm and dry,   NEUROLOGICAL: The patient is oriented to person, place and time but is forgetful at times. No movement disorder, no arm drift, visual changes, no facial droop, unilateral weakness, no numbness and tingling in left leg today            LABS:   No results found for this or any previous visit (from the past 168 hour(s)).  Results for orders placed or performed in visit on 02/05/19   Basic Metabolic Panel   Result Value Ref Range    Sodium 138 136 - 145 mmol/L    Potassium 3.8 3.5 - 5.0 mmol/L    Chloride 104 98 - 107 mmol/L    CO2 26 22 - 31 mmol/L    Anion Gap, Calculation 8 5 -  18 mmol/L    Glucose 87 70 - 125 mg/dL    Calcium 8.7 8.5 - 10.5 mg/dL    BUN 20 8 - 28 mg/dL    Creatinine 0.97 0.70 - 1.30 mg/dL    GFR MDRD Af Amer >60 >60 mL/min/1.73m2    GFR MDRD Non Af Amer >60 >60 mL/min/1.73m2         Lab Results   Component Value Date    WBC 9.1 02/19/2019    HGB 9.6 (L) 02/19/2019    HCT 31.9 (L) 02/19/2019    MCV 92 02/19/2019     02/19/2019       Lab Results   Component Value Date    [id number] 637 01/28/2019     No results found for: HGBA1C  Lab Results   Component Value Date    INR 1.18 (H) 01/26/2019    INR 1.27 (H) 07/23/2018    INR 1.21 (H) 05/16/2018     No results found for: [id number]  Lab Results   Component Value Date    TSH 3.19 01/28/2019           ASSESSMENT/PLAN:    1. Subdural hematoma, fall: No headaches, visual changes, no neuro deficits noted. Neuro checks daily. F/u with Neurosurgery 3/1-stopped keppra, increase activity as tolerated, avoid falls, f/u prn.  No recent neuro concerns. CT showed interval resolution of left frontoparietal low attenuation subdural hematoma, interval resolution of small amt of intraventricular hemorrhage left occipital horn, mod generalized volume loss and chronic ischemic changes.   2. Left hip fracture, s/p ORIF: Incisions c/d/i, 1+ edema LLE. F/u with ortho on 3/13-no notes or orders available, requested staff request copies from ortho office. tevin bashir. Ice prn. PT, OT.  Pain controlled tylenol prn    3. Moderate-severe AS: Was being evaluated for LAAO and TAVR procedure prior to hospital stay. Will need to f/u with cardiology once more stable.   4. BPH, straight cath: On doxazosin and added flomax  straight cath four times a day as doing at home and f/u with urology after discharge. No recent concerns, staff assisting patient in doing.   5. A fibrillation: Rate controlled in 70s. On metoprolol. hold parameters. No anticoagulants, antiplatelets until cleared per neurosurgery-reviewed 3/1 note and Ct showed interval resolution of  hemorrhage. continue off antiplatelets, anticoagulants due to anemia and recent SDH for now. F/u with cardiology.   6. Macdonald's esophagus, h/o GI bleed: On pantoprazole. No current issues.   7. Iron deficiency anemia, acute blood loss anemia: Follows with hem/onc for weekly/biweekly hg levels, blood transfusions, iron infusions. On folic acid. Hg 8 on 1/29. 8.7 on 2/5. HG 8.5 on 2/11, stable. Appt with hematology and infusion clinic on 2/19-iron and transferrin, ferritin stable no need for iv infusion of iron. Hg improved to 9.6. Recommended iron two times a day so this was restarted. F/u again on 4/2. Recommended gi follow up.   8. CKD stage 3: Cr level 1.03 on 1/30. Cr 0.97 on 2/5. Stable.   9. UTI: completed keflex.   10. Allergic rhinitis: On flonase.   11. Constipation: On senna docusate two times a day. BM nearly every day.   12. Cognitive impairment: Only scored 11/30 on slums and 4.2 on cpt. Speech therapy was working on cognition and now completed. Therapy recommending 24 hour supervision for safety. A/o but forgetful.      LCD 3/11 sba for toileting and dressing, ambulates 75 feet with walker and cga, 11 on slums, 4.2 on cpt. Private pay, family still looking into long term care placement.     Electronically signed by: Homa Smyth NP

## 2021-06-20 NOTE — PROGRESS NOTES
ASSESSMENT:  1. Anemia  Diagnosed this year, profoundly anemic, has done well with iron transfusions.  Source is presumed GI probably diverticular.  - metoprolol tartrate (LOPRESSOR) 25 MG tablet; Take 1 tablet (25 mg total) by mouth daily.  Dispense: 90 tablet; Refill: 3  - doxazosin (CARDURA) 4 MG tablet; Take 1 tablet (4 mg total) by mouth daily.  Dispense: 90 tablet; Refill: 3  - pantoprazole (PROTONIX) 40 MG tablet; Take 1 tablet (40 mg total) by mouth daily.  Dispense: 90 tablet; Refill: 3  - HM2(CBC w/o Differential)  - Ferritin     2. Atrial fibrillation (H)  Patient is on metoprolol but no blood thinner.    3. BPH (benign prostatic hyperplasia)  Flomax.      PLAN:  1.  High-dose influenza, pneumonia 13 vaccines.  2.  CBC, and ferritin.  3.  Hemoglobin is 12.1.  I sent a message to the hematologist Dr. Boothe to get his input as to when patient should be seen again either for an office visit or iron transfusion.  4.  Three-month follow-up see sooner as needed.  5.  5 minutes greater than 50% was spent in counseling, reviewing the previous hemoglobin values hematology workup, other studies, the patient's overall medical history.    Orders Placed This Encounter   Procedures     Influenza High Dose, Seasonal 65+ yrs     Pneumococcal conjugate vaccine 13-valent 6wks-17yrs; >50yrs     HM2(CBC w/o Differential)     Ferritin     Medications Discontinued During This Encounter   Medication Reason     ferrous sulfate 325 (65 FE) MG tablet Therapy completed     metoprolol tartrate (LOPRESSOR) 25 MG tablet Reorder     doxazosin (CARDURA) 4 MG tablet Reorder     pantoprazole (PROTONIX) 40 MG tablet Reorder     fluticasone (FLONASE) 50 mcg/actuation nasal spray Reorder       No Follow-up on file.    CHIEF COMPLAINT:  Chief Complaint   Patient presents with     Follow-up     infusion     NEEDS: zoster,flu , pcv13       SUBJECTIVE:  Lopez is a 89 y.o. male who comes in for follow-up anemia.  Patient was diagnosed with  "anemia around April of this year, he has had an extensive workup hospitalizations and so forth the source of the bleeding has not been precisely determined but the suspicion is that it is diverticular.  In either case the patient has done very well with IV iron infusions.  He was initially is feeling quite fatigued but is now in general feeling much better.  He is here in part to determine what his blood level is when he needs another iron transfusion.  It is presumed that he is going to need regular periodic transfusions to maintain his hemoglobin level.    The patient also has a history of underlying atrial fibrillation but given his bleeding status he is not on any anticoagulation but he is on metoprolol for this.    Patient has a history of BPH he is on Flomax.    REVIEW OF SYSTEMS:      All other systems are negative.    PFSH:  Immunization History   Administered Date(s) Administered     Influenza high dose, seasonal 2016, 10/25/2017, 2018     Influenza, Seasonal, Inj PF IIV3 10/01/2010     Influenza,seasonal, Inj IIV3 2012, 10/05/2013     Pneumo Conj 13-V (2010&after) 2018     Pneumo Polysac 23-V 2007     Tdap 2017     Tetanus Toxoid, Adsorbed 2008     Social History     Social History     Marital status:      Spouse name: N/A     Number of children: 2     Years of education: N/A     Occupational History     Retired.      Was a .     Social History Main Topics     Smoking status: Former Smoker     Packs/day: 2.00     Types: Cigarettes     Quit date: 1987     Smokeless tobacco: Never Used     Alcohol use No      Comment: \"not much in the last few years\"     Drug use: No     Sexual activity: Not Currently     Other Topics Concern     Not on file     Social History Narrative    Diet-        Exercise-        Lives with Son        Wife  from Hepatitis B     History reviewed. No pertinent past medical history.  Family History   Problem Relation Age " of Onset     Heart disease Father      Arthritis Son      Asthma Son      Asthma Son        MEDICATIONS:  Current Outpatient Prescriptions   Medication Sig Dispense Refill     acetaminophen (TYLENOL) 500 MG tablet Take 500 mg by mouth daily.        doxazosin (CARDURA) 4 MG tablet Take 1 tablet (4 mg total) by mouth daily. 90 tablet 3     fluticasone (FLONASE) 50 mcg/actuation nasal spray 1 spray into each nostril daily. 16 g 5     metoprolol tartrate (LOPRESSOR) 25 MG tablet Take 1 tablet (25 mg total) by mouth daily. 90 tablet 3     pantoprazole (PROTONIX) 40 MG tablet Take 1 tablet (40 mg total) by mouth daily. 90 tablet 3     No current facility-administered medications for this visit.        TOBACCO USE:  History   Smoking Status     Former Smoker     Packs/day: 2.00     Types: Cigarettes     Quit date: 5/14/1987   Smokeless Tobacco     Never Used       VITALS:  Vitals:    09/19/18 1546   BP: 106/60   Pulse: 66   Weight: 163 lb (73.9 kg)     Wt Readings from Last 3 Encounters:   09/19/18 163 lb (73.9 kg)   08/22/18 163 lb 9.6 oz (74.2 kg)   08/17/18 167 lb (75.8 kg)       PHYSICAL EXAM:  Constitutional:   Reveals a elderly male who looks his stated age.  Vitals: per nursing notes.  Musculoskeletal: No peripheral swelling.  Neuro:  Alert and oriented. Cranial nerves, motor, sensory exams are intact.  No gross focal deficits.  Psychiatric:  Memory intact, mood appropriate.    QUALITY MEASURES:      DATA REVIEWED:

## 2021-06-20 NOTE — PROGRESS NOTES
"Pt admitted per pedis with son for his iron ivp. States he feels more energy and \"better\" in general since starting on iron. IV started and med given without difficulty or side effects. Dcd home after ivp iron.  "

## 2021-06-20 NOTE — PROGRESS NOTES
Patient was identified as a potential candidate for the Clinic Care Coordination program and has declined participating. I have reached out to this patient and he has decline participation. I will discontinue outreach to the patient at this time. I have notified the patient s physician by task. If the provider feels this patient is a good fit in the future I have asked them to resend to the [facility] referral bucket. I have also provided the patient with my contact information should they change their mind.         Es [id number]   Clinic Care Coordinator

## 2021-06-21 NOTE — PROGRESS NOTES
TCM DISCHARGE FOLLOW UP CALL    Discharge Date:  11/7/2018  Reason for hospital stay (discharge diagnosis)::  Symptomatic Anemia, Iron Deficiency Anemia  Are you feeling better, the same or worse since your discharge?:  Patient is feeling better  Do you feel like you have a plan in the event of a health emergency?: Yes (Call clinic)    As part of your discharge plan, were  home care services ordered for you?: No    Did you receive any new medications, or was there a change to your medications?: No    Do you have any follow up visits scheduled with your PCP or Specialist?:  Yes, with PCP and Yes, with Specialist (Dr. Colby 11/9/18)  (RN) Is PCP appt scheduled soon enough (within 14 days of discharge date)?: Yes    Who are you seeing and when is it scheduled?:  Dr. Nur (hem/onc) 11/13/18      Kacy Batres RN Care Manager, Population Health

## 2021-06-21 NOTE — PROGRESS NOTES
ASSESSMENT:  1. Anemia  Patient has a history of anemia he is again anemic with hemoglobin 6.7, and has had lower energy has needed iron transfusions in the past.  - HM2(CBC w/o Differential)  - Ferritin      PLAN:  1.  I discussed with the patient's son Patricio the results of the hemoglobin.  2.  I put in an urgent referral to [facility] hematology the patient to be seen as soon as possible.  3.  If the patient becomes significantly symptomatic which she has in the past son is aware of this and will take him to the emergency room.  4.  He will need interval follow-up, with myself and/or hematology resume will be within the month.    Orders Placed This Encounter   Procedures     HM2(CBC w/o Differential)     Ferritin     There are no discontinued medications.    No Follow-up on file.    CHIEF COMPLAINT:  Chief Complaint   Patient presents with     Follow-up     anemia       SUBJECTIVE:  Lopez is a 89 y.o. male who presents for anemia follow up. He is accompanied by his son. His last hemoglobin from 9/19/18 was 12.1, an increase from 8/1/18 hemoglobin of 7.6. He feels he is running low today. He has been engaging in less activity. Once in a while, his stools are darker in color. He first noticed a decrease in his energy levels 3 years ago.     REVIEW OF SYSTEMS:   All other systems are negative.    PFSH:  Immunization History   Administered Date(s) Administered     Influenza high dose, seasonal 11/19/2016, 10/25/2017, 09/19/2018     Influenza, Seasonal, Inj PF IIV3 10/01/2010     Influenza,seasonal, Inj IIV3 09/21/2012, 10/05/2013     Pneumo Conj 13-V (2010&after) 09/19/2018     Pneumo Polysac 23-V 01/01/2007     Tdap 07/13/2017     Tetanus Toxoid, Adsorbed 01/01/2008     Social History     Social History     Marital status:      Spouse name: N/A     Number of children: 2     Years of education: N/A     Occupational History     Retired.      Was a .     Social History Main Topics     Smoking status:  "Former Smoker     Packs/day: 2.00     Types: Cigarettes     Quit date: 1987     Smokeless tobacco: Never Used     Alcohol use No      Comment: \"not much in the last few years\"     Drug use: No     Sexual activity: Not Currently     Other Topics Concern     Not on file     Social History Narrative    Diet-        Exercise-        Lives with Son        Wife  from Hepatitis B     History reviewed. No pertinent past medical history.  Family History   Problem Relation Age of Onset     Heart disease Father      Arthritis Son      Asthma Son      Asthma Son        MEDICATIONS:  Current Outpatient Prescriptions   Medication Sig Dispense Refill     acetaminophen (TYLENOL) 500 MG tablet Take 500 mg by mouth daily.        doxazosin (CARDURA) 4 MG tablet Take 1 tablet (4 mg total) by mouth daily. 90 tablet 3     fluticasone (FLONASE) 50 mcg/actuation nasal spray 1 spray into each nostril daily. 16 g 5     metoprolol tartrate (LOPRESSOR) 25 MG tablet Take 1 tablet (25 mg total) by mouth daily. 90 tablet 3     pantoprazole (PROTONIX) 40 MG tablet Take 1 tablet (40 mg total) by mouth daily. 90 tablet 3     No current facility-administered medications for this visit.        TOBACCO USE:  History   Smoking Status     Former Smoker     Packs/day: 2.00     Types: Cigarettes     Quit date: 1987   Smokeless Tobacco     Never Used       VITALS:  Vitals:    18 1506   BP: 102/60   Pulse: (!) 56   SpO2: 96%   Weight: 169 lb (76.7 kg)     Wt Readings from Last 3 Encounters:   18 169 lb (76.7 kg)   18 163 lb (73.9 kg)   18 163 lb 9.6 oz (74.2 kg)       PHYSICAL EXAM:  Constitutional:   Reveals a well appearing elderly male.  Vitals: per nursing notes.  Musculoskeletal: No peripheral swelling.  Neuro:  Alert and oriented. Cranial nerves, motor, sensory exams are intact.  No gross focal deficits.  Psychiatric:  Memory intact, mood appropriate.      ADDITIONAL HISTORY SUMMARIZED (2): None.  DECISION TO " OBTAIN EXTRA INFORMATION (1): None.   RADIOLOGY TESTS (1): None.  LABS (1): Labs were ordered today.   MEDICINE TESTS (1): None.  INDEPENDENT REVIEW (2 each): None.     The visit lasted a total of 10 minutes face to face with the patient. Over 50% of the time was spent counseling and educating the patient about anemia follow up.    I, Vaishali Hutchison, am scribing for and in the presence of, Dr. Colby.    I, Dr. Colby, personally performed the services described in this documentation, as scribed by Vaishali Hutchison in my presence, and it is both accurate and complete.    Total data points: 1

## 2021-06-21 NOTE — PROGRESS NOTES
[facility] Hematology and Oncology Progress Note    Patient: Lopez Mcdermott  MRN: [id number]  Date of Service: 11/13/2018        Reason for Visit    Follow-up regarding iron deficiency anemia.    Assessment and Plan      ECOG Performance    1    Distress Assessment   : See the discussion below.    Pain   : No pain.    Mr. Lopez Mcdermott is a 89 y.o. gentleman with a long-standing history of iron deficiency anemia which appears to be due to GI bleeding.  Most of his workup was done in the [facility] system.  He has transferred his care to the [facility] system this year.  The first episode of GI bleeding was in 1998 due to gastric ulcers.  Over the last few years he has had multiple endoscopic procedures done.  Apparently he had a capsule endoscopy done on 5/14/18 which did not show any specific site of bleeding.  He has a history of having Macdonald's esophagus, hiatal hernia and gastric polyps but in the upper endoscopies done over the last 1 or 2 years no specific site of bleeding was found.  He also has a history of extensive diverticulosis.  In the colonoscopy that was done on 4/18/18 there were extensive diverticular lesions seen in at least one diverticulum with evidence of recent bleeding.  In the colonoscopy that was done on 5/2/18 black material was found in the colon but it was not clear to the endoscopist will address melena or whether it was due to oral iron.  His blood work has shown iron deficiency consistently.  In the workup that we have done so far we have not found any evidence of other hematological problems.  The peripheral blood smear has not shown any dysplastic cells that will raise the possibility of myelodysplasia.  Once he receives iron he does have active reticulocytosis also.    1.  He is here to see me because he started having problems again with worsening any.  He was doing well and his hemoglobin had increased up to 12.1 on 9/19/18 when suddenly his hemoglobin came down to the 6  range and he had to be admitted to the hospital again on 1/5/18 and needed 4 units of RBC transfusion to relieve his symptoms.  When we look at the labs clearly he was again iron deficient in spite of the 10 Venofer infusions that he received plus the oral iron supplementation.  In the previous workup we have not seen any evidence of malabsorption.  In fact he has had duodenal biopsies done in the past which did not show any evidence of celiac disease.  Clearly this is due to blood loss.  With his history I am very sure that this is due to GI blood loss.  The only question is which site is bleeding.  I explained all this to the patient.  I discussed with him that from the hematology point of view, what I can do is to try to keep him replete with iron and vitamins.  With that, his body may be able to make blood to compensate for the GI bleeding.  However I am quite sure that intermittently there be episodes where he will have increased blood loss which would make him acutely anemic.  In that situation the only option will be to admit him again to the hospital and support him with blood transfusions.  I do not really see a way around that.  He had a son voiced understanding.    2.  I also discussed with him that when I look at it, I think it is worth considering further GI workup to try to identify the site of bleeding and have it dealt with.  My suspicion is that this is likely episodic diverticular bleeds that is causing the anemia based on the workup so far and his history of seeing red blood now and then in the stool.  That may need a partial colectomy.  Certainly there is risk associated with that given his heart disease and age.  However if the bleeding can be stopped I think it will improve his quality of life considerably.  I recommended a second opinion either at the [facility] or at [facility] and he was agreeable.  I am referring him to the [facility] gastroenterology department  according to his preference.  Hopefully they will be able to identify the site of bleeding and advised him on the best way to stop the bleeding.    3.  He is already taking a multivitamin tablet daily.  That is only about the 75% of daily need of folic acid.  Thus I am asking him to take an additional folic acid tablet daily.  I am giving him a prescription for that.  I reminded him that it is also available over-the-counter.    4.  I am giving him a list of iron-containing foods that he can incorporate into his diet and out from up-to-date.    5.  Apparently he does not have any cardiology follow-up for the atrial fibrillation.  He also appears to have a systolic murmur.  With his complex medical situation I think he should have cardiology follow-up.  I am referring him to cardiology.    6.  We discussed about IV iron supplementation.  I recommended Feraheme for him.  This is going to be more convenient for him.  We discussed about the possible side effects of infusion site reactions and allergic reactions.  He was agreeable.  We will give him the first dose today.  Another dose will be scheduled in the next few days.  This should give him enough iron to last for 3 or 4 months if he were not bleeding.  With the ongoing bleeding I think the iron will be used up much faster.  We will need to keep a closer watch.  Clearly oral iron supplementation was not enough to keep up with the blood loss.    7.  Follow-up: As mentioned above another Feraheme infusion in the next few days.  After that return to clinic with me or Michael Malik NP in about 3 or 4 weeks with a CBC and platelets, ferritin and TIBC panel, and a blood bank tube.  Promotional appointment for Feraheme again on that day.    Time spend >40 minutes face to face with the patient. More than 50 % in counseling and coordination of care.      Problem List    1. Iron deficiency anemia due to chronic blood loss  Ambulatory referral to Gastroenterology    HM2 (CBC  W/O DIFF)    Ferritin    Iron and Transferrin Iron Binding Capacity    JI BB    DISCONTINUED: ferumoxytol 510 mg in sodium chloride 0.9% 50 mL (FERAHEME)   2. Gastrointestinal hemorrhage, unspecified gastrointestinal hemorrhage type  Ambulatory referral to Gastroenterology    DISCONTINUED: ferumoxytol 510 mg in sodium chloride 0.9% 50 mL (FERAHEME)   3. Chronic atrial fibrillation (H)  Ambulatory referral to Cardiology   4. CKD (chronic kidney disease) stage 3, GFR 30-59 ml/min (H)  DISCONTINUED: ferumoxytol 510 mg in sodium chloride 0.9% 50 mL (FERAHEME)   5. Additional medication required for additive effect, initial encounter  DISCONTINUED: ferumoxytol 510 mg in sodium chloride 0.9% 50 mL (FERAHEME)        CC: Inocente Colby MD    ______________________________________________________________________________    History of Present Illness    Mr. Lopez Mcdermott is here for a consultation with his son.  Saw him as an inpatient consult in July when he was admitted to the hospital hemoglobin of 6.  He has a chronic history of anemia due to GI bleeding.  Please see my original consult note from 7/23/18.  He felt better after transfusion and then was discharged home.  He changed his primary care to Dr. Colby with [facility] [place].  He was treated with IV Venofer 10 times.  He was doing fairly well with that.  Hemoglobin went up to 12.1 on 9/19/18.  He suddenly started feeling tired and fatigued again and his hemoglobin of 6.7 on 11/2/18.  He had to be admitted to the hospital on 11/5/18 with a hemoglobin of 6.1.  He was transfused 4 units of packed RBCs and his hemoglobin came up to 8.3 on 11/7/18 and he could be discharged.  He is here to see me to see what can be done to deal with his anemia.    He says that he feels a lot better than when he was in the hospital.  He says that his energy is good.  His son says that he is still weak compared to what his baseline is.  He says that he walked from the parking lot to the  "clinic today he had to stop once.  Mr. Mcdermott believes that he can go up 1 flight of stairs without any problems.  At this time he is not having any dizziness.  No chest pain or palpitations.    I quizzed him about any bleeding.  He says that he has not noticed bleeding anywhere else.  On asking he admits that he has seen some blood in the stool about a week ago.  However this was not in large quantities.  He has seen very dark stools but he is not quite sure whether he has seen black colored stools.  Sometimes the stools are solid and sometimes semisolid.  He was taking oral iron but stopped that about a week ago.  He says that he is taking a Team[employer], Members Michael 50+ multivitamin tablet daily.    No fevers or night sweats.  No lumps or bumps anywhere.  No aches or pains.  He says that swallowing is fine.  Appetite is not great.  However he is trying to eat.  No nausea or vomiting.  No abdominal pain.  No constipation.  No difficulty with urination.  No skin rashes.  No numbness or tingling.    A 14 point review of system is otherwise completely negative.    Pain Status       Review of Systems    Constitutional     Neurosensory     Cardiovascular     Pulmonary     Gastrointestinal     Genitourinary     Integumentary     Patient Coping     Distress Assessment     Accompanied by       Past History  No past medical history on file.      Past Surgical History:   Procedure Laterality Date     CERVICAL FUSION  1971, 1977    x2     COLONOSCOPY  05/31/2017     COLONOSCOPY  04/18/2018    Multiple diverticula. \"Evidence of recent bleeding from diverticular opening.\"      COLONOSCOPY  05/02/2018    Black material was found in the entire colon.       COLONOSCOPY W/ POLYPECTOMY  11/30/2004     ESOPHAGOGASTRODUODENOSCOPY  04/13/1998    Healed antral ulcers.      ESOPHAGOGASTRODUODENOSCOPY  02/13/1998    Hiatal hernia with reflux esophagitis and distal esophageal erosions, multiple antral ulcers.     " ESOPHAGOGASTRODUODENOSCOPY  09/24/2002    Hiatal hernia with distal esophageal stricture and probable Macdonald's status post antral and esophageal biopsy status post balloon dilation to 18 mm.     ESOPHAGOGASTRODUODENOSCOPY  05/31/2017     ESOPHAGOGASTRODUODENOSCOPY  10/24/2017     ESOPHAGOGASTRODUODENOSCOPY  04/17/2018     ESOPHAGOGASTRODUODENOSCOPY  05/02/2018     INGUINAL HERNIA REPAIR Bilateral 08/08/2001    Right indirect and direct inguinal hernia, left direct inguinal hernia.      TRANSURETHRAL RESECTION OF PROSTATE  11/17/2006    Cystoscopy, KTP laser TURP.     TRANSURETHRAL RESECTION OF PROSTATE  10/21/2009    CYSTOSCOPY, TUR OF PROSTATE WITH KTP LASER - PROCEDURE: CYSTOSCOPY AND TRANSURETHRAL RESECTION OF PROSTATE WITH KTP LASER       Physical Exam    Recent Vitals 11/13/2018   Height -   Weight 166 lbs 10 oz   BSA (m2) 1.9 m2   /64   Pulse 78   Temp 97.8   Temp src 1   SpO2 98   Some recent data might be hidden       GENERAL: Alert and oriented. Seated comfortably. In no distress.  I think he has lost weight since I last saw him.    HEAD: Atraumatic and normocephalic.  Has a full head of hair.    EYES: JUANITO, EOMI.  Mild pallor.  No icterus.    Oral cavity: no mucosal lesion or tonsillar enlargement.    NECK: supple. JVP normal.  No thyroid enlargement.    LYMPH NODES: No palpable, cervical, axillary or inguinal lymphadenopathy.    CHEST: clear to auscultation bilaterally.  Resonant to percussion throughout bilaterally.  Symmetrical breath movements bilaterally.    CVS: S1 and S2 are heard. Irregular rate and rhythm.  I think he has a systolic murmur.    ABDOMEN: Soft. Not tender. Not distended.  No palpable hepatomegaly or splenomegaly.  No other mass palpable.  Bowel sounds heard.    EXTREMITIES: Warm.  No peripheral edema.  He does have some nail dystrophy with mild spooning of the nails.    SKIN: no rash, or bruising or purpura.        Lab Results    Recent Results (from the past 24 hour(s))    Comprehensive Metabolic Panel    Collection Time: 11/13/18  9:17 AM   Result Value Ref Range    Sodium 142 136 - 145 mmol/L    Potassium 4.0 3.5 - 5.0 mmol/L    Chloride 109 (H) 98 - 107 mmol/L    CO2 24 22 - 31 mmol/L    Anion Gap, Calculation 9 5 - 18 mmol/L    Glucose 121 70 - 125 mg/dL    BUN 25 8 - 28 mg/dL    Creatinine 1.17 0.70 - 1.30 mg/dL    GFR MDRD Af Amer >60 >60 mL/min/1.73m2    GFR MDRD Non Af Amer 59 (L) >60 mL/min/1.73m2    Bilirubin, Total 0.5 0.0 - 1.0 mg/dL    Calcium 8.7 8.5 - 10.5 mg/dL    Protein, Total 6.2 6.0 - 8.0 g/dL    Albumin 3.5 3.5 - 5.0 g/dL    Alkaline Phosphatase 69 45 - 120 U/L    AST 14 0 - 40 U/L    ALT 12 0 - 45 U/L   Reticulocytes    Collection Time: 11/13/18  9:17 AM   Result Value Ref Range    Retic Absolute Count 0.123 (H) 0.010 - 0.110 mill/uL   HM1 (CBC with Diff)    Collection Time: 11/13/18  9:17 AM   Result Value Ref Range    WBC 5.7 4.0 - 11.0 thou/uL    RBC 2.93 (L) 4.40 - 6.20 mill/uL    Hemoglobin 8.1 (L) 14.0 - 18.0 g/dL    Hematocrit 27.5 (L) 40.0 - 54.0 %    MCV 94 80 - 100 fL    MCH 27.6 27.0 - 34.0 pg    MCHC 29.5 (L) 32.0 - 36.0 g/dL    RDW 15.4 (H) 11.0 - 14.5 %    Platelets 174 140 - 440 thou/uL    MPV 9.5 8.5 - 12.5 fL    Neutrophils % 74 (H) 50 - 70 %    Lymphocytes % 16 (L) 20 - 40 %    Monocytes % 7 2 - 10 %    Eosinophils % 3 0 - 6 %    Basophils % 0 0 - 2 %    Neutrophils Absolute 4.2 2.0 - 7.7 thou/uL    Lymphocytes Absolute 0.9 0.8 - 4.4 thou/uL    Monocytes Absolute 0.4 0.0 - 0.9 thou/uL    Eosinophils Absolute 0.2 0.0 - 0.4 thou/uL    Basophils Absolute 0.0 0.0 - 0.2 thou/uL     Results for GALILEO GROVE (MRN [id number]) as of 11/13/2018 10:32   Ref. Range 11/7/2018 15:26   Iron Latest Ref Range: 42 - 175 ug/dL 22 (L)   Folate Latest Ref Range: >=3.5 ng/mL 7.8   Vitamin B-12 Latest Ref Range: 213 - 816 pg/mL 483   Transferrin Latest Ref Range: 212 - 360 mg/dL 250   Transferrin IBC, Calculated Latest Ref Range: 313 - 563 ug/dL 312 (L)    Transferrin Saturation, Calculated Latest Ref Range: 20 - 50 % 7 (L)       Imaging    No results found.      Signed by: Vania Nur MD

## 2021-06-21 NOTE — PROGRESS NOTES
Hospital discharge follow up call to pt, no answer.  LVM on primary phone number, that RN will try back another time, & reminded pt of their upcoming hospital f/u appt w/Dr. Colby, 11/9/18, 9:10am.    Kacy Batres RN Care Manager, Population Health

## 2021-06-21 NOTE — PROGRESS NOTES
Patient ambulated into Infusion Care accompanied by his son. A peripheral IV was started in his right AC. Patient received Fereheme infusion without any problems. Peripehral IV was flushed and discontinued. Dry 2 x 2 gauze and Coban was used to cover IV site. VSS and all questions answered.

## 2021-06-21 NOTE — PROGRESS NOTES
"ASSESSMENT:  1. Iron deficiency anemia   Patient has recurrent episodes of symptomatic anemia, probably due to GI blood loss though has not been proven definitively.  - HM2(CBC w/o Differential)      PLAN:  1.  CBC results show hemoglobin of 8.6, which is actually stable and reassuring.  2.  Patient is scheduled for an iron transfusion and will presumably  getting these fairly regular basis.  3.  Follow-up in January, see earlier as needed.       Orders Placed This Encounter   Procedures     HM2(CBC w/o Differential)     There are no discontinued medications.    No Follow-up on file.    CHIEF COMPLAINT:  Chief Complaint   Patient presents with     Follow-up     discharge anemia 8.2       SUBJECTIVE:  Lopez is a 89 y.o. male who presents for an inpatient follow-up for anemia. Patient states that he feels better and no medication changes were made. He mentions that he has an iron infusion appointment scheduled for later today. He notes that he had a dark BM while at the hospital but his BM's have returned to normal yesterday and today.      REVIEW OF SYSTEMS:   All other systems are negative.    PFSH:  Immunization History   Administered Date(s) Administered     Influenza high dose, seasonal 11/19/2016, 10/25/2017, 09/19/2018     Influenza, Seasonal, Inj PF IIV3 10/01/2010     Influenza,seasonal, Inj IIV3 09/21/2012, 10/05/2013     Pneumo Conj 13-V (2010&after) 09/19/2018     Pneumo Polysac 23-V 01/01/2007     Tdap 07/13/2017     Tetanus Toxoid, Adsorbed 01/01/2008     Social History     Social History     Marital status:      Spouse name: N/A     Number of children: 2     Years of education: N/A     Occupational History     Retired.      Was a brick layer.     Social History Main Topics     Smoking status: Former Smoker     Packs/day: 2.00     Types: Cigarettes     Quit date: 5/14/1987     Smokeless tobacco: Never Used     Alcohol use No      Comment: \"not much in the last few years\"     Drug use: No     Sexual " activity: Not Currently     Other Topics Concern     Not on file     Social History Narrative    Diet-        Exercise-        Lives with Son        Wife  from Hepatitis B     History reviewed. No pertinent past medical history.  Family History   Problem Relation Age of Onset     Heart disease Father      Arthritis Son      Asthma Son      Asthma Son        MEDICATIONS:  Current Outpatient Prescriptions   Medication Sig Dispense Refill     acetaminophen (TYLENOL) 500 MG tablet Take 500 mg by mouth daily.        doxazosin (CARDURA) 4 MG tablet Take 1 tablet (4 mg total) by mouth daily. 90 tablet 3     fluticasone (FLONASE) 50 mcg/actuation nasal spray 1 spray into each nostril daily. 16 g 5     metoprolol tartrate (LOPRESSOR) 25 MG tablet Take 1 tablet (25 mg total) by mouth daily. 90 tablet 3     pantoprazole (PROTONIX) 40 MG tablet Take 1 tablet (40 mg total) by mouth daily. 90 tablet 3     No current facility-administered medications for this visit.        TOBACCO USE:  History   Smoking Status     Former Smoker     Packs/day: 2.00     Types: Cigarettes     Quit date: 1987   Smokeless Tobacco     Never Used       VITALS:  Vitals:    18 0912   BP: 118/56   Pulse: 76   SpO2: 97%   Weight: 173 lb (78.5 kg)     Wt Readings from Last 3 Encounters:   18 173 lb (78.5 kg)   18 164 lb 14.4 oz (74.8 kg)   18 169 lb (76.7 kg)       PHYSICAL EXAM:  Constitutional:   Reveals a male who appears his stated age .  Vitals: per nursing notes.  Musculoskeletal: No peripheral swelling.  Neuro:  Alert and oriented. Cranial nerves, motor, sensory exams are intact.  No gross focal deficits.  Psychiatric:  Memory intact, mood appropriate.    QUALITY MEASURES:      DATA REVIEWED:  Additional History from Old Records Summarized (2): Reviewed office visit from 18. Reviewed discharge summary from 18.  Decision to Obtain Records (1):   Radiology Tests Summarized or Ordered (1):   Labs Reviewed or  Ordered (1): Reviewed labs from 11/02/18 and 11/05/18. Labs ordered.  Medicine Test Summarized or Ordered (1):   Independent Review of EKG, X-RAY, or RAPID STREP (2 each):     The visit lasted a total of 7 minutes face to face with the patient. Over 50% of the time was spent counseling and educating the patient about his above concerns..    By signing my name below, I, Immanuel Ward, attest that this documentation has been prepared under the direction and in the presence of Dr. Inocente Colby.  Electronic Signature: Brunilda Alvarado. 11/9/2018 9:15 AM.    I, Dr. Colby, personally performed the services described in this documentation. All medical record entries made by the scribe were at my direction and in my presence. I have reviewed the chart and discharge instructions (if applicable) and agree that the record reflects my personal performance and is accurate and complete.      Total data points: 3

## 2021-06-22 NOTE — PROGRESS NOTES
Lopez is here today for a 4 weeks follow up with labs and OV with Michael Malik for ongoing management and tx of iron deficiency anemia. Maite Escobar

## 2021-06-22 NOTE — PROGRESS NOTES
Results for GALILEO GROVE (MRN [id number]) as of 12/17/2018 11:20   Ref. Range 11/9/2018 09:31 11/13/2018 09:17 12/11/2018 10:49 12/13/2018 10:44 12/17/2018 10:44   Hemoglobin Latest Ref Range: 14.0 - 18.0 g/dL 8.6 (L) 8.1 (L) 9.4 (L)  9.9 (L)   Patient here for weekly CBC, hx of GI bleed.   No transfusion needed. Michael ANGELES NP aware and verbalizes agreement with plan.   Patient and son agree, will RTC on 12/31/18 for recheck.   Deborah Coleman RN

## 2021-06-22 NOTE — PROGRESS NOTES
LAAO device education was completed:     See documented H&P completed by NP in EPIC    Reviewed pre and post op SONNY/Watchman procedure with pt, pre-procedure letter reviewed and given to pt- see letter in EPIC under documentation, see additional LAAO devic prep note for details on procedure/prep, risks of SONNY/LAAO discussed, answered pt questions and concerns regarding procedure, time spent with pt was >45min and reviewed available pre-op lab results.    Discussed importance of continuing anticoagulation uninterrupted pre and post LAAO, pt denies missing any doses of their anticoagulant, and pt denies issues with garcia placement in the past.  Instructed patient that they will be contacted post SONNY for further discussion regarding the Watchman procedure.       MODIFIED MEL SCALE   No prior CVA    Score Description  0 No symptoms at all  1 No significant disability despite symptoms; able to carry out all usual duties and activities  2 Slight disability; unable to carry out all previous activities, but able to look after own affairs without assistance  3 Moderate disability; requiring some help, but able to walk without assistance  4 Moderately severe disability; unable to walk without assistance and unable to attend to own bodily needs without assistance  5 Severe disability; bedridden, incontinent and requiring constant nursing care and attention  6 Dead     Total score (0 - 6): 0      Please see additional anticoagulation note for INR/Coumadin dosing.    Contact information reviewed and pt states understanding.

## 2021-06-22 NOTE — PATIENT INSTRUCTIONS - HE
Lopez Mcdermott,    It was a pleasure to see you today at the [facility].     My recommendations after this visit include:    - you will need coronary angiogram as part of your TAVR workup  - you will then need a SONNY or CT for evaluating the left atrial appendage anatomy      Pallavi Ward PA-C, MPAS      If you have questions or concerns, please call using the numbers below:    Luis Fournier RN  Valve clinic coordinator  [phone]    Mary Lou Weaver RN  Watchman coordinator  [phone]    After Hours/Scheduling  [phone]

## 2021-06-22 NOTE — PROGRESS NOTES
Pt ambulatory with son for lab draw and possible blood transfusion. Labs drawn and IV placed. HGB 9.4, spoke with Michael MOREL, pt not to receive transfusion. Pt's family has wanted labs weekly, talked with son and let him know that he has been stable since November and that maybe we should go biweekly. Pt and family agreeable, will let Michael MOREL aware.  aware of new plan.

## 2021-06-22 NOTE — PROGRESS NOTES
Spoke to pt's son this morning regarding possible LAAO implant  He is interested in initial consult after the fist of the year  I briefly reviewed the WATCHMAN and possible research (ASAPTOO) with him and said we could discuss further at time of consult  Will send out [facility] brochure in the interim

## 2021-06-22 NOTE — PROGRESS NOTES
[facility] Heart Care Note    Assessment:    Lopez Mcdermott is a 89 y.o. old male with AF, ongoing GIB issues due to diverticulosis, PUD, BPH, CKD, here for w/u of management options for his   valvular disease and AF.    Plan:  # AS - suspect progression possibly to severe based on physical exam w/ late peaking murmur, pulsus parvus et tardus, as well as clinical history of HFPEF and recurrent GIB  - will screen w/ a TTE and if severe, would really consider TAVR w/u; if addressed, in addition to mortality benefit and HFPEF improvement, this could help decrease severity of his GIB  - if AS is not severe, would continue screening w/ serial TTE's every 6 mos to a year    # AF - continue metoprolol, in view of CHADSVASC 4 and elevated HASBLED, will screen for Watchman or other LAAO candidacy  - EKG w/ AF at 53, slow R-wave progression      THIERRY GREENE  [facility] HEART [facility]  [phone]  ______________________________________________________________________    Subjective:  CC: AF, AS, HFPEF    I had the opportunity to see Lopez Mcdermott at the [facility]. Lopez Mcdermott is a 89 y.o. male with a known history of  AF, ongoing GIB issues due to diverticulosis, PUD, BPH, CKD, here for w/u of management options for his   valvular disease and AF.    He is referred bu his Hematologist for CV care establishment.  It appears that  has geoff diagnosed w/ AF 2-3 years ao but was intolerant of AC due to frequent recurrent GIB w/ anemia to HgB 6 and 4 admissions for GIB w/in the past year, latest one in Nov. He also reports severe peripheral edema, which has improved/resolved w/ initiating of diuretics earlier this year, w/a concomitant 60lb wt. Loss.     He currently hasnno CV complaints, denies CP, shortness of breath, + ANN, no orthopnea/PND edema. Had an episode of syncope in July - stood up after constipation/BM in the context of GIB. No palpitations, N/V/D/F/C.    His most recent OSH TTE was in  "'17 and reportedly showed presevred LVEF, mod AS w/ P/M 40/20 and significant TR w/ RVSP 50 + RA.    Lives w/ his son, prior smoker quit 35 years ago, minimal EtOH, .    ______________________________________________________________________      Review of Systems:   As noted in HPI, all others reviewed and are negative      Problem List:  Patient Active Problem List   Diagnosis     Gastrointestinal hemorrhage, unspecified gastrointestinal hemorrhage type     Iron deficiency anemia due to chronic blood loss     Atrial fibrillation (H)     BPH (benign prostatic hyperplasia)     CKD (chronic kidney disease) stage 3, GFR 30-59 ml/min (H)     Additional medication required for additive effect, initial encounter     Medical History:  No past medical history on file.  Surgical History:  Past Surgical History:   Procedure Laterality Date     CERVICAL FUSION  1971, 1977    x2     COLONOSCOPY  05/31/2017     COLONOSCOPY  04/18/2018    Multiple diverticula. \"Evidence of recent bleeding from diverticular opening.\"      COLONOSCOPY  05/02/2018    Black material was found in the entire colon.       COLONOSCOPY W/ POLYPECTOMY  11/30/2004     ESOPHAGOGASTRODUODENOSCOPY  04/13/1998    Healed antral ulcers.      ESOPHAGOGASTRODUODENOSCOPY  02/13/1998    Hiatal hernia with reflux esophagitis and distal esophageal erosions, multiple antral ulcers.     ESOPHAGOGASTRODUODENOSCOPY  09/24/2002    Hiatal hernia with distal esophageal stricture and probable Macdonald's status post antral and esophageal biopsy status post balloon dilation to 18 mm.     ESOPHAGOGASTRODUODENOSCOPY  05/31/2017     ESOPHAGOGASTRODUODENOSCOPY  10/24/2017     ESOPHAGOGASTRODUODENOSCOPY  04/17/2018     ESOPHAGOGASTRODUODENOSCOPY  05/02/2018     INGUINAL HERNIA REPAIR Bilateral 08/08/2001    Right indirect and direct inguinal hernia, left direct inguinal hernia.      TRANSURETHRAL RESECTION OF PROSTATE  11/17/2006    Cystoscopy, KTP laser TURP.     " "TRANSURETHRAL RESECTION OF PROSTATE  10/21/2009    CYSTOSCOPY, TUR OF PROSTATE WITH KTP LASER - PROCEDURE: CYSTOSCOPY AND TRANSURETHRAL RESECTION OF PROSTATE WITH KTP LASER     Social History:  Social History     Socioeconomic History     Marital status:      Spouse name: Not on file     Number of children: 2     Years of education: Not on file     Highest education level: Not on file   Social Needs     Financial resource strain: Not on file     Food insecurity - worry: Not on file     Food insecurity - inability: Not on file     Transportation needs - medical: Not on file     Transportation needs - non-medical: Not on file   Occupational History     Occupation: Retired.     Comment: Was a .   Tobacco Use     Smoking status: Former Smoker     Packs/day: 2.00     Types: Cigarettes     Last attempt to quit: 1987     Years since quittin.6     Smokeless tobacco: Never Used   Substance and Sexual Activity     Alcohol use: No     Comment: \"not much in the last few years\"     Drug use: No     Sexual activity: Not Currently   Other Topics Concern     Not on file   Social History Narrative    Diet-        Exercise-        Lives with Son        Wife  from Hepatitis B           Family History:  Family History   Problem Relation Age of Onset     Heart disease Father      Arthritis Son      Asthma Son      Asthma Son          Allergies:  No Known Allergies    Medications:  Current Outpatient Medications   Medication Sig Dispense Refill     acetaminophen (TYLENOL) 500 MG tablet Take 500 mg by mouth daily.        doxazosin (CARDURA) 4 MG tablet Take 1 tablet (4 mg total) by mouth daily. 90 tablet 3     fluticasone (FLONASE) 50 mcg/actuation nasal spray 1 spray into each nostril daily. 16 g 5     folic acid (FOLVITE) 1 MG tablet Take 1 tablet (1 mg total) by mouth daily. 90 tablet 3     metoprolol tartrate (LOPRESSOR) 25 MG tablet Take 1 tablet (25 mg total) by mouth daily. 90 tablet 3     " "multivitamin therapeutic tablet Take 1 tablet by mouth daily.       pantoprazole (PROTONIX) 40 MG tablet Take 1 tablet (40 mg total) by mouth daily. 90 tablet 3     No current facility-administered medications for this visit.        Objective:   Vital signs:  /60 (Patient Site: Left Arm, Patient Position: Sitting, Cuff Size: Adult Regular)   Pulse 64   Resp 16   Ht 5' 8\" (1.727 m)   Wt 168 lb 6.4 oz (76.4 kg)   BMI 25.61 kg/m        Physical Exam:    GENERAL APPEARANCE: Alert, cooperative and in no acute distress.   HEENT: No scleral icterus. Oral mucuos membranes pink and moist.   NECK: JVP 8. No Hepatojugular reflux. Thyroid not visualized. No lymphadenopathy   CHEST: clear to auscultation   CARDIOVASCULAR: S1, S2 late peaking 4/6 SM, no clicks or rubs. Brachial, radial and posterior tibial pulses are intact and symetric. No carotid bruits noted. No edema. Dimished carotid upstroke  ABDOMEN: Nontender. BS+. No bruits.   SKIN: No Xanthelasma   Musculoskeletal: No cyanosis, clubbing or swelling.      Lab Results:  LIPIDS:  No results found for: CHOL  No results found for: HDL  No results found for: LDLCALC  No results found for: TRIG  No components found for: CHOLHDL    BMP:  Lab Results   Component Value Date    CREATININE 1.17 11/13/2018    BUN 25 11/13/2018     11/13/2018    K 4.0 11/13/2018     (H) 11/13/2018    CO2 24 11/13/2018         [facility] HEART [facility]                  "

## 2021-06-22 NOTE — PROGRESS NOTES
"[facility] Hematology and Oncology Progress Note    Patient: Lopez Mcdermott  MRN: [id number]  Date of Service: 12/11/2018        Reason for Visit:    Scheduled follow-up.    Assessment and Plan:    1) Iron deficiency anemia     89 y.o.     History of Macdonald's esophagus, hiatal hernia, gastric polyps and extensive diverticulosis    Appears to be due to GI bleeding.      1998 - first episode due to gastric ulcers.       Multiple endoscopic procedures - no specific site of bleeding was found.    04/18/18 colonoscopy showed extensive diverticular lesionswith evidence of recent bleeding in at least one diverticulum.      05/02/18 colonoscopy showed black material in the colon but it was not clear to the endoscopist if it was melena or due to oral iron.     05/14/18 capsule endoscopy did not show any specific site of bleeding.        07/23 - 07/25/18 hospitalized with HgB @ 6.    Transfused 3 units pRBCs    Treated with IV Venofer 10 times.      09/19/18 - Hemoglobin increased to 12.1.      11/05 - 11/07/18 hospitalized with HgB @ 6.1.      Transfused with 4 units of packed RBCs - HgB increased to 8.3.      Blood work has consistently shown iron deficiency.      Peripheral blood smear has not shown any dysplastic cells.      Once he receives iron he does have active reticulocytosis.    No evidence of malabsorption.  Duodenal biopsies in the past did not show any evidence of celiac disease.      Patient does occasionally see red blood in the stool.    11/13/18 and 11/19/18 - last doses Feraheme.    12/11/18:    CBC - Plts and WBC WNL.  HgB increased @ 9.4.    Iron studies - pending.    VSS.  Weight stable.  \"Feels the best\" he has \"in years\".     From a hematology point of view, all we can do is try to keep him replete with iron and vitamins.      However, no doubt he will intermittently be acutely anemic requiring hospitalizations to support with blood transfusions.      [facility] to try and identify the site of bleeding and have it dealt with.  This appears to be episodic diverticular bleeding.  If the bleeding can be stopped it would improve his quality of life considerably.      Discussed iron-containing foods that he can incorporate into his diet.    Cardiology follow-up for atrial fibrillation.      OFF oral iron supplementation.      Continue daily folic acid and MVT    Patient's family requests weekly HEME2 for possible transfusion.    01/08/18 - follow up with weekly CBC.     ECOG Performance:     ECOG Performance Status: 0    Distress Assessment - no distress.        TT > 25 minutes face to face with > 50 % in counseling and coordination of care.    Michael Malik, CNP      CC: Inocente Colby MD  _______________________________________________________________    Interim History:    Mr. Lopez Mcdermott presents accompanied by his son.  Lopez says he \"feels the best\" he has \"in years\".  He walked from the parking lot to the clinic today without issues.  He states he can go up 1 flight of stairs without any problems.  He denies dizziness, chest pain or palpitations.  He has not noticed bleeding anywhere and his stools are now brown.  He denies fevers or night sweats, chills, lumps or bumps anywhere, nausea or vomiting, abdominal pain, constipation or diarrhea, skin rash, numbness or tingling.  He states that he self caths about 4 times daily for the past 7 years.  His appetite and weight are very stable.    Pain Status:    Currently in Pain: No/denies    Review of Systems    Constitutional  Constitutional (WDL): All constitutional elements are within defined limits  Neurosensory  Neurosensory (WDL): All neurosensory elements are within defined limits  Cardiovascular  Cardiovascular (WDL): All cardiovascular elements are within defined limits  Pulmonary  Respiratory (WDL): Exceptions to WDL  Cough: Mild symptoms, nonprescription intervention indicated(thick chronic " "cough)  Gastrointestinal  Gastrointestinal (WDL): All gastrointestinal elements are within defined limits  Genitourinary  Genitourinary (WDL): Exceptions to WDL(self-cath 4x/day)  Integumentary  Integumentary (WDL): All integumentary elements are within defined limits  Patient Coping  Patient Coping: Accepting  Distress Assessment     Accompanied by  Accompanied by: Family Member    Past History:    No past medical history on file.      Past Surgical History:   Procedure Laterality Date     CERVICAL FUSION  1971, 1977    x2     COLONOSCOPY  05/31/2017     COLONOSCOPY  04/18/2018    Multiple diverticula. \"Evidence of recent bleeding from diverticular opening.\"      COLONOSCOPY  05/02/2018    Black material was found in the entire colon.       COLONOSCOPY W/ POLYPECTOMY  11/30/2004     ESOPHAGOGASTRODUODENOSCOPY  04/13/1998    Healed antral ulcers.      ESOPHAGOGASTRODUODENOSCOPY  02/13/1998    Hiatal hernia with reflux esophagitis and distal esophageal erosions, multiple antral ulcers.     ESOPHAGOGASTRODUODENOSCOPY  09/24/2002    Hiatal hernia with distal esophageal stricture and probable Macdonald's status post antral and esophageal biopsy status post balloon dilation to 18 mm.     ESOPHAGOGASTRODUODENOSCOPY  05/31/2017     ESOPHAGOGASTRODUODENOSCOPY  10/24/2017     ESOPHAGOGASTRODUODENOSCOPY  04/17/2018     ESOPHAGOGASTRODUODENOSCOPY  05/02/2018     INGUINAL HERNIA REPAIR Bilateral 08/08/2001    Right indirect and direct inguinal hernia, left direct inguinal hernia.      TRANSURETHRAL RESECTION OF PROSTATE  11/17/2006    Cystoscopy, KTP laser TURP.     TRANSURETHRAL RESECTION OF PROSTATE  10/21/2009    CYSTOSCOPY, TUR OF PROSTATE WITH KTP LASER - PROCEDURE: CYSTOSCOPY AND TRANSURETHRAL RESECTION OF PROSTATE WITH KTP LASER       Physical Exam:    Recent Vitals 12/11/2018   Weight 168 lbs 5 oz   BSA (m2) 1.91 m2   /56   Pulse 61   Temp 98.2   Temp src 1   SpO2 99   Some recent data might be hidden       GENERAL: "   Pleasant.  Alert and oriented.  Comfortable.  In no distress.  Accompanied by son.    HEAD:    Full head of hair.  XIN.  EOMI.  Mild pallor.  No icterus.  No mucosal lesions.    LYMPH NODES:  No palpable cervical, axillary or inguinal lymphadenopathy.    CHEST:   Lungs clear.    CVS:    S1 and S2 heard.  Irregular rate and rhythm.  Possible systolic murmur.    ABDOMEN:   Soft.  Not tender.  Not distended.  No palpable hepatomegaly or splenomegaly, masses or ascites.    EXTREMITIES:  Warm.  No peripheral edema.    SKIN:    No rash, bruising or purpura noted.    Lab Results:    Reviewed with patient and son.    Recent Results (from the past 24 hour(s))   HM2 (CBC W/O DIFF)   Result Value Ref Range    WBC 5.9 4.0 - 11.0 thou/uL    RBC 3.31 (L) 4.40 - 6.20 mill/uL    Hemoglobin 9.4 (L) 14.0 - 18.0 g/dL    Hematocrit 31.8 (L) 40.0 - 54.0 %    MCV 96 80 - 100 fL    MCH 28.4 27.0 - 34.0 pg    MCHC 29.6 (L) 32.0 - 36.0 g/dL    RDW 17.6 (H) 11.0 - 14.5 %    Platelets 162 140 - 440 thou/uL    MPV 9.0 8.5 - 12.5 fL       Imaging:    No results found.

## 2021-06-22 NOTE — PROGRESS NOTES
"      Cardiothoracic Surgery Consult    Date of Service: 1/8/2019    REFERRING CARDIOLOGIST: Dr. Reggie Gracia.    REASON FOR CONSULTATION: Mr. Mcdermott is an 89 year-old man with severe aortic stenosis.     HISTORY OF PRESENT ILLNESS:  Mr. Mcdermott is an 89 year-old man with multiple medical problems in addition to severe aortic stenosis. He has had multiple admissions the past year or a gastrointestinal bleed that was thought to be due to diverticulosis. He has iron deficiency anemia related to this but it is much better now. He also has a history of peptic ulcer disease and gastric polyps, and he has chronic kidney disease.     He has chronic atrial fibrillation related to valvular heart disease. He has severe aortic stenosis, as well as moderate calcific mitral stenosis, and moderate tricuspid regurgitation. Despite all of this, he is doing fairly well and he does not complain of dyspnea on exertion, syncope or presyncope. He denies chest pain or pressure. He has no orthopnea, PND or lower extremity edema.    PAST MEDICAL HISTORY:   Severe AS  Moderate MS  PUC  BPH  Diverticulosis  CKD  Atrial Fibrillation  Lower gastrointestinal bleed  Iron deficiency anemia  Hiatal hernia  Macdonald's esophagus  Gastric polyps      PAST SURGICAL HISTORY:   Past Surgical History:   Procedure Laterality Date     CERVICAL FUSION  1971, 1977    x2     COLONOSCOPY  05/31/2017     COLONOSCOPY  04/18/2018    Multiple diverticula. \"Evidence of recent bleeding from diverticular opening.\"      COLONOSCOPY  05/02/2018    Black material was found in the entire colon.       COLONOSCOPY W/ POLYPECTOMY  11/30/2004     ESOPHAGOGASTRODUODENOSCOPY  04/13/1998    Healed antral ulcers.      ESOPHAGOGASTRODUODENOSCOPY  02/13/1998    Hiatal hernia with reflux esophagitis and distal esophageal erosions, multiple antral ulcers.     ESOPHAGOGASTRODUODENOSCOPY  09/24/2002    Hiatal hernia with distal esophageal stricture and probable Macdonald's status " post antral and esophageal biopsy status post balloon dilation to 18 mm.     ESOPHAGOGASTRODUODENOSCOPY  05/31/2017     ESOPHAGOGASTRODUODENOSCOPY  10/24/2017     ESOPHAGOGASTRODUODENOSCOPY  04/17/2018     ESOPHAGOGASTRODUODENOSCOPY  05/02/2018     INGUINAL HERNIA REPAIR Bilateral 08/08/2001    Right indirect and direct inguinal hernia, left direct inguinal hernia.      TRANSURETHRAL RESECTION OF PROSTATE  11/17/2006    Cystoscopy, KTP laser TURP.     TRANSURETHRAL RESECTION OF PROSTATE  10/21/2009    CYSTOSCOPY, TUR OF PROSTATE WITH KTP LASER - PROCEDURE: CYSTOSCOPY AND TRANSURETHRAL RESECTION OF PROSTATE WITH KTP LASER       ALLERGIES:   No Known Allergies       CURRENT MEDICATIONS:  Current Outpatient Medications on File Prior to Visit   Medication Sig Dispense Refill     acetaminophen (TYLENOL) 500 MG tablet Take 500 mg by mouth daily.        doxazosin (CARDURA) 4 MG tablet Take 1 tablet (4 mg total) by mouth daily. 90 tablet 3     fluticasone (FLONASE) 50 mcg/actuation nasal spray 1 spray into each nostril daily. 16 g 5     folic acid (FOLVITE) 1 MG tablet Take 1 tablet (1 mg total) by mouth daily. 90 tablet 3     metoprolol tartrate (LOPRESSOR) 25 MG tablet Take 1 tablet (25 mg total) by mouth daily. 90 tablet 3     multivitamin therapeutic tablet Take 1 tablet by mouth daily.       pantoprazole (PROTONIX) 40 MG tablet Take 1 tablet (40 mg total) by mouth daily. 90 tablet 3     No current facility-administered medications on file prior to visit.          FAMILY HISTORY:   Family History   Problem Relation Age of Onset     Heart disease Father      Arthritis Son      Asthma Son      Asthma Son        SOCIAL HISTORY:   Social History     Socioeconomic History     Marital status:      Spouse name: Not on file     Number of children: 2     Years of education: Not on file     Highest education level: Not on file   Social Needs     Financial resource strain: Not on file     Food insecurity - worry: Not on  "file     Food insecurity - inability: Not on file     Transportation needs - medical: Not on file     Transportation needs - non-medical: Not on file   Occupational History     Occupation: brick layer     Employer: RETIRED   Tobacco Use     Smoking status: Former Smoker     Packs/day: 2.00     Types: Cigarettes     Last attempt to quit: 1987     Years since quittin.6     Smokeless tobacco: Never Used   Substance and Sexual Activity     Alcohol use: No     Comment: \"not much in the last few years\"     Drug use: No     Sexual activity: Not Currently   Other Topics Concern     Not on file   Social History Narrative    Diet-        Exercise-        Lives with Son        Wife  from Hepatitis B       REVIEW OF SYSTEMS:  A complete 10 point review of systems was obtained and is negative other than the above stated complaints    PHYSICAL EXAMINATION:   Vitals: /82 (Patient Site: Right Arm, Patient Position: Sitting, Cuff Size: Adult Large)   Pulse 64   Resp 16   Ht 5' 8\" (1.727 m)   Wt 170 lb (77.1 kg)   BMI 25.85 kg/m    GENERAL: Elderly and frail   HEENT: Normocephalic, and conjunctiva anicteric and sclera clear   NECK: negative  CARDIOVASCULAR: irregular rhythm  RESPIRATIONS: no tachypnea, retractions or cyanosis  ABDOMEN: soft, nondistended  EXTREMITIES:negative; no deformity or swelling   NEUROLOGIC: intact and symmetric with no focal deficits.   PSYCHIATRIC: alert and oriented x3, pleasant       LABORATORY STUDIES:   Lab Results   Component Value Date    WBC 5.5 2018    HGB 9.4 (L) 2018    HCT 31.6 (L) 2018    MCV 92 2018     2018      Lab Results   Component Value Date    CREATININE 1.17 2018    BUN 25 2018     2018    K 4.0 2018     (H) 2018    CO2 24 2018     EKG:   Atrial fibrillation with slow ventricular response   Septal infarct (cited on or before 14-MAY-2018)   Abnormal ECG   When compared with ECG of " 23-JUL-2018 11:07,   No significant change was found       TRANSTHORACIC ECHOCARDIOGRAM: This study was personally reviewed by me    Moderate to severe aortic stenosis.    Normal left ventricular size and systolic function. The calculated left ventricular ejection fraction is 72%.    Normal right ventricular size and systolic function.    Moderate calcific mitral stenosis with mild regurgitation.    Moderate tricuspid valve regurgitation. Mild pulmonary hypertension is present.    Biatrial volume is moderately increased    No previous study for comparison.  mean gradient 37 but SVI 28, DI 0.2.    IMPRESSION AND PLAN: Mr. Mcdermott is an 89 year-old man with severe aortic stenosis. I recommend aortic valve replacement. I discussed the technical details of open surgical AVR with the patient, as well as the expected postoperative course and recovery. The risks include but are not limited to bleeding, infection, stroke, heart or graft failure, dysrhythmia, respiratory failure, kidney or liver injury, bowel or limb ischemia, and death. His calculated STS risk for mortality with isolated aortic valve replacement is high given his age and frailty. Because he has high operative risk with open surgical AVR, I do not recommend this. Instead, I recommend transcatheter aortic valve replacement. I discussed the technical details of this with him as well. He understands that there is a risk of bleeding, infection, stroke, heart block requiring permanent pacemaker, cardiac perforation, aortic root rupture and aortic dissection, in addition to risk of death. He wishes to proceed with evaluation and scheduling for TAVR.      Thank you very much for this referral.       Patricio Maldonado 1/8/2019 11:02 AM

## 2021-06-23 NOTE — TELEPHONE ENCOUNTER
Pt is still inpt and will discharge 1-30-19.  Likely to [facility] in [place]  Kymberly Galvan RN

## 2021-06-23 NOTE — PROGRESS NOTES
[facility]    DATE: 2019    NAME:  Lopez Mcdermott             :  1929  MRN: [id number]  CODE STATUS:  FULL CODE    VISIT TYPE: Problem Visit     FACILITY:  [facility] [[id number]]       CHIEF COMPLAIN/REASON FOR VISIT:    Chief Complaint   Patient presents with     Problem Visit               HISTORY OF PRESENT ILLNESS: Lopez Mcdermott is a 89 y.o. male who was admitted - for fall and found to have subdural hematoma on CT and Left comminuted intertrochanteric hip fracture. Neurosurgery was consulted and recommended conservative management. He was monitored in ICU and moved to general floor. His blood pressure was optimized <150. Neurology was consulted for possible seizure activity and recommended keppra. He underwent ORIF of Left hip fracture on . He was noted to have mod to severe AS and cardiology was consulted and recommended outpatient eval for TAVR procedure. He was also treated for UTI and completed 7 days of keflex. He was transferred to [facility] for further rehab. He has PMH of moderate to severe AS, BPH, anemia, CKD stage 3, A fib, gupta esophagus. Prior to this he lived at home with his son and daughter in law.     Today Mr. Mcdermott is seen for follow up on anemia and urinary retention. He reports yesterday he was in a lot of pain with therapy but it was controlled with the pain medicine. He slept fine overnight and has minimal pain today. His catheter is in place and denies any issues with it. He believes his bowels are moving fine and no shortness of breath or cough. He eats great when the food is great. He thinks he is doing well. His aid was in room during visit and asked about bowel habits and no concerns. Aid says he has been doing fine. Nursing reports son noted to staff over weekend that he has been on oral iron in past and this did not work for him so he was on iron infusions and following with blood specialist.      REVIEW OF SYSTEMS:  PROBLEMS AND REVIEW OF SYSTEMS:   Review of Systems  Today on ROS:   Currently, no fever, chills, or rigors. Decreased vision and hearing. Denies any chest pain, palpitations, lightheadedness, dizziness. Appetite is good. Denies any GERD symptoms. Denies any difficulty with swallowing, nausea, or vomiting.  Denies any abdominal pain, diarrhea or constipation. No active bleeding. No rash. Positive for left hip incisions, left hip pain, no visual changes, headaches, shortness of breath with exertion, weakness, EZ stand for transfers, swelling in left leg, numbness left leg, urinary retention-garcia in place      No Known Allergies  Current Outpatient Medications   Medication Sig     acetaminophen (TYLENOL) 500 MG tablet Take 2 tablets (1,000 mg total) by mouth 3 (three) times a day.     cephalexin (KEFLEX) 500 MG capsule Take 1 capsule (500 mg total) by mouth 4 (four) times a day for 10 days.     doxazosin (CARDURA) 4 MG tablet Take 1 tablet (4 mg total) by mouth daily.     ferrous sulfate 325 (65 FE) MG tablet Take 1 tablet by mouth 2 (two) times a day.     fluticasone (FLONASE) 50 mcg/actuation nasal spray 1 spray into each nostril daily.     folic acid (FOLVITE) 1 MG tablet Take 1 tablet (1 mg total) by mouth daily.     levETIRAcetam (KEPPRA) 500 MG tablet Take 1 tablet (500 mg total) by mouth 2 (two) times a day.     metoprolol tartrate (LOPRESSOR) 25 MG tablet Take 1 tablet (25 mg total) by mouth daily.     multivitamin therapeutic tablet Take 1 tablet by mouth daily.     oxyCODONE (ROXICODONE) 5 MG immediate release tablet Take 0.5-1 tablets (2.5-5 mg total) by mouth every 4 (four) hours as needed.     pantoprazole (PROTONIX) 40 MG tablet Take 1 tablet (40 mg total) by mouth daily.     senna-docusate (PERICOLACE) 8.6-50 mg tablet Take 1 tablet by mouth 2 (two) times a day.     tamsulosin (FLOMAX) 0.4 mg cap Take 0.4 mg by mouth.     Past Medical History:    No past medical history on  file.        PHYSICAL EXAMINATION  Vitals:    02/11/19 2125   BP: 99/56   Pulse: 72   Resp: 18   Temp: (!) 96  F (35.6  C)   SpO2: 97%   Weight: 173 lb (78.5 kg)       Today on physical exam:     GENERAL: Awake, Alert, oriented x3, not in any form of acute distress, answers questions appropriately, follows simple commands, conversant  HEENT: Head is normocephalic with normal hair distribution. No evidence of trauma. Ears: No acute purulent discharge. Eyes: Conjunctivae pink with no scleral jaundice. Nose: Normal mucosa and septum. NECK: Supple with no cervical or supraclavicular lymphadenopathy. Trachea is midline. glasses  CHEST: No tenderness or deformity, no crepitus  LUNG: Clear to auscultation with good chest expansion. There are no crackles or wheezes, normal AP diameter.  BACK: No kyphosis of the thoracic spine. Symmetric, no curvature, ROM normal, no CVA tenderness, no spinal tenderness   CVS: irregularly irregular rhythm, systolic murmur, no rubs, gallops, or heaves,  2+ pulses symmetric in all extremities.  ABDOMEN: Rounded and soft, nontender to palpation, non distended, no masses, no organomegaly, good bowel sounds, no rebound or guarding, no peritoneal signs.   EXTREMITIES: 1+ LLE pedal edema, Left hip two incisions c/d/i, Left hip pain and tenderness with ROM  SKIN: Warm and dry, scattered ecchymosis, two small abrasions to Left forearm and elbow  NEUROLOGICAL: The patient is oriented to person, place and time but is forgetful at times. No movement disorder, no arm drift, visual changes, no facial droop, unilateral weakness, mild numbness and tingling in left leg.             LABS:   Recent Results (from the past 168 hour(s))   Basic Metabolic Panel   Result Value Ref Range    Sodium 138 136 - 145 mmol/L    Potassium 3.8 3.5 - 5.0 mmol/L    Chloride 104 98 - 107 mmol/L    CO2 26 22 - 31 mmol/L    Anion Gap, Calculation 8 5 - 18 mmol/L    Glucose 87 70 - 125 mg/dL    Calcium 8.7 8.5 - 10.5 mg/dL    BUN  20 8 - 28 mg/dL    Creatinine 0.97 0.70 - 1.30 mg/dL    GFR MDRD Af Amer >60 >60 mL/min/1.73m2    GFR MDRD Non Af Amer >60 >60 mL/min/1.73m2   HM1 (CBC with Diff)   Result Value Ref Range    WBC 9.3 4.0 - 11.0 thou/uL    RBC 3.28 (L) 4.40 - 6.20 mill/uL    Hemoglobin 8.7 (L) 14.0 - 18.0 g/dL    Hematocrit 29.8 (L) 40.0 - 54.0 %    MCV 91 80 - 100 fL    MCH 26.5 (L) 27.0 - 34.0 pg    MCHC 29.2 (L) 32.0 - 36.0 g/dL    RDW 19.1 (H) 11.0 - 14.5 %    Platelets 393 140 - 440 thou/uL    MPV 11.6 8.5 - 12.5 fL    Neutrophils % 74 (H) 50 - 70 %    Lymphocytes % 16 (L) 20 - 40 %    Monocytes % 7 2 - 10 %    Eosinophils % 2 0 - 6 %    Basophils % 0 0 - 2 %    Neutrophils Absolute 6.8 2.0 - 7.7 thou/uL    Lymphocytes Absolute 1.5 0.8 - 4.4 thou/uL    Monocytes Absolute 0.7 0.0 - 0.9 thou/uL    Eosinophils Absolute 0.2 0.0 - 0.4 thou/uL    Basophils Absolute 0.0 0.0 - 0.2 thou/uL   Hemoglobin   Result Value Ref Range    Hemoglobin 8.0 (L) 14.0 - 18.0 g/dL   Hemoglobin   Result Value Ref Range    Hemoglobin 7.6 (L) 14.0 - 18.0 g/dL   Hemoglobin   Result Value Ref Range    Hemoglobin 8.5 (L) 14.0 - 18.0 g/dL     Results for orders placed or performed in visit on 02/05/19   Basic Metabolic Panel   Result Value Ref Range    Sodium 138 136 - 145 mmol/L    Potassium 3.8 3.5 - 5.0 mmol/L    Chloride 104 98 - 107 mmol/L    CO2 26 22 - 31 mmol/L    Anion Gap, Calculation 8 5 - 18 mmol/L    Glucose 87 70 - 125 mg/dL    Calcium 8.7 8.5 - 10.5 mg/dL    BUN 20 8 - 28 mg/dL    Creatinine 0.97 0.70 - 1.30 mg/dL    GFR MDRD Af Amer >60 >60 mL/min/1.73m2    GFR MDRD Non Af Amer >60 >60 mL/min/1.73m2         Lab Results   Component Value Date    WBC 9.3 02/05/2019    HGB 8.5 (L) 02/11/2019    HCT 29.8 (L) 02/05/2019    MCV 91 02/05/2019     02/05/2019       Lab Results   Component Value Date    FAYWKTJC22 637 01/28/2019     No results found for: HGBA1C  Lab Results   Component Value Date    INR 1.18 (H) 01/26/2019    INR 1.27 (H)  07/23/2018    INR 1.21 (H) 05/16/2018     No results found for: [id number]  Lab Results   Component Value Date    TSH 3.19 01/28/2019           ASSESSMENT/PLAN:    1. Subdural hematoma, fall: No headaches, visual changes, no neuro deficits noted. Neuro checks daily. F/u with Neurosurgery in 2 weeks with head CT, scheduled for 2/11-per review of records no notes from 2/11 but had CT ordered for 2/11 but no imaging report, unsure if patient missed appt?-asked staff to check into this and make sure has a f/u with neuro. Keppra for seizure prophylaxis.   2. Left hip fracture, s/p ORIF: Incisions c/d/i, 1+ edema LLE. Pain controlled. Tylenol three times a day, oxycodone prn. F/u with ortho in 2 weeks. tevin hose. Ice prn. PT, OT. No appt noted in appt book-reminded staff needs f/u scheduled.   3. Moderate-severe AS: Was being evaluated for LAAO and TAVR procedure prior to hospital stay. Will need to f/u with cardiology once more stable.   4. BPH, Jimenez catheter: On doxazosin. Jimenez catheter in place, appears was placed on 1/26 with no void trial inpatient. No h/o urinary retention. Removed on 2/7 and failed void trial. Will attempt void trial again on 2/14. On flomax. Per family report was straight cathing intermittently last few years at home. Due to debilitation may be unable to do that at this time but will attempt one more void trial as completed treatment for uti with straight cath prn. Will need to f/u with urology after discharge.   5. A fibrillation: Rate controlled in 70s. On metoprolol. Added hold parameters. No anticoagulants, antiplatelets until cleared per neurosurgery.   6. Macdonald's esophagus, h/o GI bleed: On pantoprazole. No current issues.   7. Iron deficiency anemia, acute blood loss anemia: Follows with hem/onc for weekly/biweekly hg levels, blood transfusions, iron infusions. On folic acid. Hg 8 on 1/29. 8.7 on 2/5. HG 8.5 on 2/11, stable. Requested staff schedule f/u with hem/onc to resume iron  infusions.   8. CKD stage 3: Cr level 1.03 on 1/30. Cr 0.97 on 2/5. Stable.   9. UTI: completed keflex.   10. Allergic rhinitis: On flonase.   11. Constipation: On senna docusate two times a day.     Per therapy soft LCD 2/20 EZ stand assist of 1 for transfers, min/max for dressing, ambulates 10 feet in parallel bars with cga, slums 11.     Electronically signed by: Homa Smyth NP    Total 25 minutes of which 55% was spent in counseling and coordination of care of the above plan    Time spent in interview and examination of patient, review of available records, and discussion with nursing staff. Continue care plan, efforts at therapy, and monitor nutritional status.

## 2021-06-23 NOTE — TELEPHONE ENCOUNTER
PATIENT NAME:  Lopez Mcdermott  YOB: 1929  MRN: [id number]  SURGEON: Dr. May Flores.  DATE of SURGERY/CONSULT: 1/27/2019  PROCEDURE/DIAGNOSIS: SDH, SAH    FOLLOW-UP:    Post Op/Follow up  Visit: 2 weeks   Post-op/Follow up Provider: NP  DIAGNOSTICS:  radiology: CT scan: CT head prior to NP visit  (Appt , please ask the nurse or NP to complete an order, which will be given to Aneta for scheduling  DISPOSITION:  TCU    ADDITIONAL INSTRUCTIONS FOR MEDICAL STAFF:    CT head without contrast in 2weeks with NP visit.

## 2021-06-23 NOTE — PROGRESS NOTES
"  Name:  Lopez Mcdermott  :  1929  MRN: [id number]  Code Status:  Full Code    Visit Type: Review Of Multiple Medical Conditions     Facility:  [facility], [place] [[id number]]  Facility Type:     History of Present Illness:  This is an 89 year old male here following hospitalization following a fall. He was found to have a subdural hematoma ,and a traumatic comminuted intertrochanteric left hip fracture. He was placed on seizure medication and will be followed up in 2 weeks for a head CT. Patient did have surgery after ortho was consulted,s/p internal fixation, fracture, trochanteric, hip, using pins or rods, using Bledsoe Table. Post surgical course was uneventful. Patient was started on keflex for UTI for 7 days. He was also found to have iron deficiency anemia due to chronic blood loss, recommended follow-up. Reports revealed patient has moderate to severe aortic stenosis, with follow up . A catheter is in place secondary to BPH.  Resident was admitted 19 to facility. He is alert with some forgetfulness at times. He is assist of 2 with EZ stand, assist of 2 with all transfers and assist of 1 with all ADL's. He is on a regular diet, takes medications whole. Staff has no issues of concern today.      No past medical history on file.  Past Surgical History:   Procedure Laterality Date     CERVICAL FUSION  , 1977    x2     COLONOSCOPY  2017     COLONOSCOPY  2018    Multiple diverticula. \"Evidence of recent bleeding from diverticular opening.\"      COLONOSCOPY  2018    Black material was found in the entire colon.       COLONOSCOPY W/ POLYPECTOMY  2004     ESOPHAGOGASTRODUODENOSCOPY  1998    Healed antral ulcers.      ESOPHAGOGASTRODUODENOSCOPY  1998    Hiatal hernia with reflux esophagitis and distal esophageal erosions, multiple antral ulcers.     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     ESOPHAGOGASTRODUODENOSCOPY  2017     ESOPHAGOGASTRODUODENOSCOPY  10/24/2017     ESOPHAGOGASTRODUODENOSCOPY  2018     ESOPHAGOGASTRODUODENOSCOPY  2018     HIP PINNING Left 2019    Procedure: INTERNAL FIXATION, FRACTURE, TROCHANTERIC, HIP, USING PINS OR RODS, USING HANA TABLE;  Surgeon: Fran Chi MD;  Location: [facility];  Service: Orthopedics     INGUINAL HERNIA REPAIR Bilateral 2001    Right indirect and direct inguinal hernia, left direct inguinal hernia.      TRANSURETHRAL RESECTION OF PROSTATE  2006    Cystoscopy, KTP laser TURP.     TRANSURETHRAL RESECTION OF PROSTATE  10/21/2009    CYSTOSCOPY, TUR OF PROSTATE WITH KTP LASER - PROCEDURE: CYSTOSCOPY AND TRANSURETHRAL RESECTION OF PROSTATE WITH KTP LASER     Family History   Problem Relation Age of Onset     Heart disease Father      Arthritis Son      Asthma Son      Asthma Son      Social History     Tobacco Use     Smoking status: Former Smoker     Packs/day: 2.00     Types: Cigarettes     Last attempt to quit: 1987     Years since quittin.7     Smokeless tobacco: Never Used   Substance Use Topics     Alcohol use: No     Comment: \"not much in the last few years\"     Drug use: No       Medications:  Current Outpatient Medications   Medication Sig Dispense Refill     acetaminophen (TYLENOL) 500 MG tablet Take 2 tablets (1,000 mg total) by mouth 3 (three) times a day.  0     cephalexin (KEFLEX) 500 MG capsule Take 1 capsule (500 mg total) by mouth 4 (four) times a day for 10 days. 40 capsule 0     doxazosin (CARDURA) 4 MG tablet Take 1 tablet (4 mg total) by mouth daily. 90 tablet 3     fluticasone (FLONASE) 50 mcg/actuation nasal spray 1 spray into each nostril daily. 16 g 5     folic acid (FOLVITE) 1 MG tablet Take 1 tablet (1 mg total) by mouth daily. 90 tablet 3     levETIRAcetam (KEPPRA) 500 MG tablet " "Take 1 tablet (500 mg total) by mouth 2 (two) times a day. 60 tablet 1     metoprolol tartrate (LOPRESSOR) 25 MG tablet Take 1 tablet (25 mg total) by mouth daily. 90 tablet 3     multivitamin therapeutic tablet Take 1 tablet by mouth daily.       oxyCODONE (ROXICODONE) 5 MG immediate release tablet Take 0.5-1 tablets (2.5-5 mg total) by mouth every 4 (four) hours as needed. 20 tablet 0     pantoprazole (PROTONIX) 40 MG tablet Take 1 tablet (40 mg total) by mouth daily. 90 tablet 3     senna-docusate (PERICOLACE) 8.6-50 mg tablet Take 1 tablet by mouth 2 (two) times a day.  0     No current facility-administered medications for this visit.          No Known Allergies    Vital Signs:   B/P115/60 02sats room air 96% afebrile P 74 R 16 .5    ROS:   Resident denies any uncontrolled pain- is on oxycodone- ,no  nausea, vomiting, dizziness headache, shortness of breath, vision bowel or night sweats fever chills chest pain abdominal pain. No constipation loose stools. Swallows okay eats okay. Catheter in place. \"I like therapy\".    PHYSICAL EXAM:  General: This is an alert male up in his room in NAD  HEENT:Normocephalic/atraumatic Conjunctivae without erythema nares are clear of any drainage.  Neck: No adenopathy JVD thyromegaly  Resp: Clear No rhonchi wheezing rales  Cardio: Regular rate and rhythm No murmurs appreciated  Abdomen: Soft nontender no masses distention bowel sounds are present.  Extremities: No lower extremity edema fair peripheral pulses-dressing on left leg- intact  Skin: No noted skin issues  : Catheter patent  Musculoskeletal: Age-related degenerative joint disease  Psych: alert and conversive     Labs:   Admission on 01/26/2019, Discharged on 02/01/2019   Component Date Value Ref Range Status     ABORh 01/26/2019 O POS   Final     Antibody Screen 01/26/2019 Negative  Negative Final     Sodium 01/27/2019 145  136 - 145 mmol/L Final     Potassium 01/27/2019 3.8  3.5 - 5.0 mmol/L Final     Chloride " 01/27/2019 114* 98 - 107 mmol/L Final     CO2 01/27/2019 19* 22 - 31 mmol/L Final     Anion Gap, Calculation 01/27/2019 12  5 - 18 mmol/L Final     Glucose 01/27/2019 98  70 - 125 mg/dL Final     Calcium 01/27/2019 8.4* 8.5 - 10.5 mg/dL Final     BUN 01/27/2019 32* 8 - 28 mg/dL Final     Creatinine 01/27/2019 1.18  0.70 - 1.30 mg/dL Final     GFR MDRD Af Amer 01/27/2019 >60  >60 mL/min/1.73m2 Final     GFR MDRD Non Af Amer 01/27/2019 58* >60 mL/min/1.73m2 Final     WBC 01/27/2019 7.2  4.0 - 11.0 thou/uL Final     RBC 01/27/2019 3.73* 4.40 - 6.20 mill/uL Final     Hemoglobin 01/27/2019 9.7* 14.0 - 18.0 g/dL Final     Hematocrit 01/27/2019 31.7* 40.0 - 54.0 % Final     MCV 01/27/2019 85  80 - 100 fL Final     MCH 01/27/2019 26.0* 27.0 - 34.0 pg Final     MCHC 01/27/2019 30.6* 32.0 - 36.0 g/dL Final     RDW 01/27/2019 18.0* 11.0 - 14.5 % Final     Platelets 01/27/2019 127* 140 - 440 thou/uL Final     MPV 01/27/2019 10.8  8.5 - 12.5 fL Final     Phosphorus 01/27/2019 3.1  2.5 - 4.5 mg/dL Final     Magnesium 01/27/2019 2.1  1.8 - 2.6 mg/dL Final     Glucose 01/27/2019 113  70 - 139 mg/dL Final     PFA-COL/EPI 01/27/2019 46  1 - 180 sec Final     Glucose 01/27/2019 93  70 - 139 mg/dL Final     Potassium 01/27/2019 4.5  3.5 - 5.0 mmol/L Final     Phosphorus 01/28/2019 3.1  2.5 - 4.5 mg/dL Final     Magnesium 01/28/2019 2.1  1.8 - 2.6 mg/dL Final     Vitamin B-12 01/28/2019 637  213 - 816 pg/mL Final     TSH 01/28/2019 3.19  0.30 - 5.00 uIU/mL Final     Hemoglobin 01/28/2019 8.3* 14.0 - 18.0 g/dL Final     Sodium 01/28/2019 142  136 - 145 mmol/L Final     Potassium 01/28/2019 4.2  3.5 - 5.0 mmol/L Final     Chloride 01/28/2019 114* 98 - 107 mmol/L Final     CO2 01/28/2019 18* 22 - 31 mmol/L Final     Anion Gap, Calculation 01/28/2019 10  5 - 18 mmol/L Final     Glucose 01/28/2019 84  70 - 125 mg/dL Final     Calcium 01/28/2019 8.0* 8.5 - 10.5 mg/dL Final     BUN 01/28/2019 28  8 - 28 mg/dL Final     Creatinine 01/28/2019  1.08  0.70 - 1.30 mg/dL Final     GFR MDRD Af Amer 01/28/2019 >60  >60 mL/min/1.73m2 Final     GFR MDRD Non Af Amer 01/28/2019 >60  >60 mL/min/1.73m2 Final     Glucose 01/28/2019 78  70 - 139 mg/dL Final     Levetiracetam 01/28/2019 17.3  6.0 - 46.0 ug/mL Final     Glucose 01/28/2019 163* 70 - 139 mg/dL Final     Glucose 01/28/2019 98  70 - 139 mg/dL Final     Phosphorus 01/29/2019 2.5  2.5 - 4.5 mg/dL Final     Magnesium 01/29/2019 2.2  1.8 - 2.6 mg/dL Final     Hemoglobin 01/29/2019 8.0* 14.0 - 18.0 g/dL Final     Potassium 01/29/2019 4.0  3.5 - 5.0 mmol/L Final     Glucose 01/29/2019 114  70 - 139 mg/dL Final     Glucose 01/29/2019 114  70 - 139 mg/dL Final     Phosphorus 01/30/2019 2.4* 2.5 - 4.5 mg/dL Final     Magnesium 01/30/2019 2.0  1.8 - 2.6 mg/dL Final     Potassium 01/30/2019 4.0  3.5 - 5.0 mmol/L Final     Glucose 01/30/2019 101  70 - 139 mg/dL Final     Creatinine 01/30/2019 1.03  0.70 - 1.30 mg/dL Final     GFR MDRD Af Amer 01/30/2019 >60  >60 mL/min/1.73m2 Final     GFR MDRD Non Af Amer 01/30/2019 >60  >60 mL/min/1.73m2 Final     Phosphorus 01/31/2019 2.7  2.5 - 4.5 mg/dL Final     Magnesium 01/31/2019 2.1  1.8 - 2.6 mg/dL Final     Phosphorus 02/01/2019 2.9  2.5 - 4.5 mg/dL Final     Magnesium 02/01/2019 2.1  1.8 - 2.6 mg/dL Final     Potassium 02/01/2019 4.1  3.5 - 5.0 mmol/L Final     Glucose 02/01/2019 97  70 - 139 mg/dL Final   Admission on 01/26/2019, Discharged on 01/26/2019   Component Date Value Ref Range Status     SYSTOLIC BLOOD PRESSURE 01/26/2019 173  mmHg Final     DIASTOLIC BLOOD PRESSURE 01/26/2019 91  mmHg Final     VENTRICULAR RATE 01/26/2019 101  BPM Final     ATRIAL RATE 01/26/2019 153  BPM Final     QRS DURATION 01/26/2019 90  ms Final     Q-T INTERVAL 01/26/2019 372  ms Final     QTC CALCULATION (MATHIEU) 01/26/2019 482  ms Final     R AXIS 01/26/2019 69  degrees Final     T AXIS 01/26/2019 81  degrees Final     MUSE DIAGNOSIS 01/26/2019    Final                    Value:Possible  Atrial fibrillation  Septal infarct (cited on or before 14-MAY-2018)  Abnormal ECG  When compared with ECG of 13-DEC-2018 10:44,  QT has lengthened  Confirmed by JANNETTE JOSEPH MD LOC: ([id number]) on 1/27/2019 2:46:36 PM       Sodium 01/26/2019 144  136 - 145 mmol/L Final     Potassium 01/26/2019 4.1  3.5 - 5.0 mmol/L Final     Chloride 01/26/2019 110* 98 - 107 mmol/L Final     CO2 01/26/2019 20* 22 - 31 mmol/L Final     Anion Gap, Calculation 01/26/2019 14  5 - 18 mmol/L Final     Glucose 01/26/2019 120  70 - 125 mg/dL Final     Calcium 01/26/2019 8.9  8.5 - 10.5 mg/dL Final     BUN 01/26/2019 36* 8 - 28 mg/dL Final     Creatinine 01/26/2019 1.36* 0.70 - 1.30 mg/dL Final     GFR MDRD Af Amer 01/26/2019 60* >60 mL/min/1.73m2 Final     GFR MDRD Non Af Amer 01/26/2019 49* >60 mL/min/1.73m2 Final     Color, UA 01/26/2019 Deb* Colorless, Yellow, Straw, Light Yellow Final     Clarity, UA 01/26/2019 Hazy* Clear Final     Glucose, UA 01/26/2019 Negative  Negative Final     Bilirubin, UA 01/26/2019 Negative  Negative Final     Ketones, UA 01/26/2019 Negative  Negative Final     Specific Gravity, UA 01/26/2019 1.010  1.001 - 1.030 Final     Blood, UA 01/26/2019 Large* Negative Final     pH, UA 01/26/2019 6.0  4.5 - 8.0 Final     Protein, UA 01/26/2019 100 mg/dL* Negative mg/dL Final     Urobilinogen, UA 01/26/2019 <2.0 E.U./dL  <2.0 E.U./dL, 2.0 E.U./dL Final     Nitrite, UA 01/26/2019 Negative  Negative Final     Leukocytes, UA 01/26/2019 Large* Negative Final     Bacteria, UA 01/26/2019 Many* None Seen hpf Final     RBC, UA 01/26/2019 * None Seen, 0-2 hpf Final     WBC, UA 01/26/2019 >100* None Seen, 0-5 hpf Final     Squam Epithel, UA 01/26/2019 0-5  None Seen, 0-5 lpf Final     WBC Clumps 01/26/2019 Present* None Seen Final     Mucus, UA 01/26/2019 Few* None Seen lpf Final     INR 01/26/2019 1.18* 0.90 - 1.10 Final     PTT 01/26/2019 37  24 - 37 seconds Final     WBC 01/26/2019 8.2  4.0 - 11.0 thou/uL Final      RBC 01/26/2019 3.92* 4.40 - 6.20 mill/uL Final     Hemoglobin 01/26/2019 10.5* 14.0 - 18.0 g/dL Final     Hematocrit 01/26/2019 34.0* 40.0 - 54.0 % Final     MCV 01/26/2019 87  80 - 100 fL Final     MCH 01/26/2019 26.8* 27.0 - 34.0 pg Final     MCHC 01/26/2019 30.9* 32.0 - 36.0 g/dL Final     RDW 01/26/2019 17.9* 11.0 - 14.5 % Final     Platelets 01/26/2019 121* 140 - 440 thou/uL Final     MPV 01/26/2019 10.8  8.5 - 12.5 fL Final     Neutrophils % 01/26/2019 79* 50 - 70 % Final     Lymphocytes % 01/26/2019 10* 20 - 40 % Final     Monocytes % 01/26/2019 10  2 - 10 % Final     Eosinophils % 01/26/2019 1  0 - 6 % Final     Basophils % 01/26/2019 0  0 - 2 % Final     Neutrophils Absolute 01/26/2019 6.4  2.0 - 7.7 thou/uL Final     Lymphocytes Absolute 01/26/2019 0.8  0.8 - 4.4 thou/uL Final     Monocytes Absolute 01/26/2019 0.9  0.0 - 0.9 thou/uL Final     Eosinophils Absolute 01/26/2019 0.1  0.0 - 0.4 thou/uL Final     Basophils Absolute 01/26/2019 0.0  0.0 - 0.2 thou/uL Final     CK, Total 01/26/2019 242* 30 - 190 U/L Final     Culture 01/26/2019 >100,000 col/ml Klebsiella oxytoca*  Final   Lab Standing Order on 01/14/2019   Component Date Value Ref Range Status     WBC 01/14/2019 8.1  4.0 - 11.0 thou/uL Final     RBC 01/14/2019 3.73* 4.40 - 6.20 mill/uL Final     Hemoglobin 01/14/2019 10.1* 14.0 - 18.0 g/dL Final     Hematocrit 01/14/2019 33.7* 40.0 - 54.0 % Final     MCV 01/14/2019 90  80 - 100 fL Final     MCH 01/14/2019 27.1  27.0 - 34.0 pg Final     MCHC 01/14/2019 30.0* 32.0 - 36.0 g/dL Final     RDW 01/14/2019 16.6* 11.0 - 14.5 % Final     Platelets 01/14/2019 171  140 - 440 thou/uL Final     MPV 01/14/2019 9.2  8.5 - 12.5 fL Final   Hospital Outpatient Visit on 01/09/2019   Component Date Value Ref Range Status     POC GFR AMER AF HE 01/09/2019 >60   >60 mL/min/1.73m2 Final     POC GFR NON AMER AF 01/09/2019 52 * >60 mL/min/1.73m2 Final     POC Creatinine 01/09/2019 1.3  mg/dL Final        Diagnoses:     ICD-10-CM    1. Subdural hematoma (H) S06.5X9A    2. Closed displaced intertrochanteric fracture of left femur, initial encounter (H) S72.142A    3. Fall, initial encounter W19.XXXA    4. Acute cystitis without hematuria N30.00    5. Seizures (H) R56.9    6. Severe aortic stenosis I35.0    7. Iron deficiency anemia due to chronic blood loss D50.0    8. Physical deconditioning R53.81    9. Benign prostatic hyperplasia, unspecified whether lower urinary tract symptoms present N40.0        Plan: We will continue to monitor for seizures secondary to subdural hematoma-follow up with neurology as advised.(he is on Keppra) Resident is treated for UTI on keflex and does have a catheter in place-with a dx of BPH. He is in therapy secondary to Left femur fracture- and physical deconditioning- and he seems to like the therapy he is receiving, we will continue with OT PT. He will follow up with ortho as advised. He does have a history of Iron deficency anemia secondary to blood loss, with last hemoglobin at 10.5 on 1/26/19. Continue to follow HBG as indicated and recheck hemogram1 next week. Patient will have a TAVR evaluation as outpatient-f/u with cardiology 2-3 weeks (severe aortic stenosis).,and follow up with primary MD in 3-4 weeks.  VSS have been stable, we will continue to monitor and continue with current care plan as indicated.Patient status does currently appear to be stable.  This is patients admission note.    Electronically signed by: Alissa Moody CNP

## 2021-06-23 NOTE — PROGRESS NOTES
[facility]    DATE: 2019    NAME:  Lopez Mcdermott             :  1929  MRN: [id number]  CODE STATUS:  FULL CODE    VISIT TYPE: Problem Visit (urinary retention)     FACILITY:  [facility] [[id number]]       CHIEF COMPLAIN/REASON FOR VISIT:    Chief Complaint   Patient presents with     Problem Visit     urinary retention               HISTORY OF PRESENT ILLNESS: Lopez Mcdermott is a 89 y.o. male who was admitted - for fall and found to have subdural hematoma on CT and Left comminuted intertrochanteric hip fracture. Neurosurgery was consulted and recommended conservative management. He was monitored in ICU and moved to general floor. His blood pressure was optimized <150. Neurology was consulted for possible seizure activity and recommended keppra. He underwent ORIF of Left hip fracture on . He was noted to have mod to severe AS and cardiology was consulted and recommended outpatient eval for TAVR procedure. He was also treated for UTI and completed 7 days of keflex. He was transferred to [facility] for further rehab. He has PMH of moderate to severe AS, BPH, anemia, CKD stage 3, A fib, gupta esophagus. Prior to this he lived at home with his son and daughter in law.     Today Mr. Mcdermott is seen for follow up on urinary retention. His garcia was removed yesterday and staff report yesterday he had PVrs of 400 and 339. Then last evening he had difficulty voiding and bladder scan showed 800cc. Staff straight cathed him yesterday and this morning and each time was >250cc. He was complaining of abdominal pain yesterday before being straight cathed. On exam Mr. Mcdermott states he has no issues voiding. He says he has been like this his entire life and that he is able to go without any issues. He says his appetite is good and bowels are moving. He is somewhat confused today. He is not able to recall the events of catheterizing overnight. Per  staff still requiring EZ stand for transfers with nursing. Pain has appeared controlled.     REVIEW OF SYSTEMS:  PROBLEMS AND REVIEW OF SYSTEMS:   Review of Systems  Today on ROS:   Currently, no fever, chills, or rigors. Decreased vision and hearing. Denies any chest pain, palpitations, lightheadedness, dizziness. Appetite is good. Denies any GERD symptoms. Denies any difficulty with swallowing, nausea, or vomiting.  Denies any abdominal pain, diarrhea or constipation. No active bleeding. No rash. Positive for left hip incisions, left hip pain, no visual changes, headaches, shortness of breath with exertion, weakness, EZ stand for transfers, swelling in left leg, numbness left leg, urinary retention      No Known Allergies  Current Outpatient Medications   Medication Sig     tamsulosin (FLOMAX) 0.4 mg cap Take 0.4 mg by mouth.     acetaminophen (TYLENOL) 500 MG tablet Take 2 tablets (1,000 mg total) by mouth 3 (three) times a day.     cephalexin (KEFLEX) 500 MG capsule Take 1 capsule (500 mg total) by mouth 4 (four) times a day for 10 days.     doxazosin (CARDURA) 4 MG tablet Take 1 tablet (4 mg total) by mouth daily.     ferrous sulfate 325 (65 FE) MG tablet Take 1 tablet by mouth 2 (two) times a day.     fluticasone (FLONASE) 50 mcg/actuation nasal spray 1 spray into each nostril daily.     folic acid (FOLVITE) 1 MG tablet Take 1 tablet (1 mg total) by mouth daily.     levETIRAcetam (KEPPRA) 500 MG tablet Take 1 tablet (500 mg total) by mouth 2 (two) times a day.     metoprolol tartrate (LOPRESSOR) 25 MG tablet Take 1 tablet (25 mg total) by mouth daily.     multivitamin therapeutic tablet Take 1 tablet by mouth daily.     oxyCODONE (ROXICODONE) 5 MG immediate release tablet Take 0.5-1 tablets (2.5-5 mg total) by mouth every 4 (four) hours as needed.     pantoprazole (PROTONIX) 40 MG tablet Take 1 tablet (40 mg total) by mouth daily.     senna-docusate (PERICOLACE) 8.6-50 mg tablet Take 1 tablet by mouth 2 (two)  times a day.     Past Medical History:    No past medical history on file.        PHYSICAL EXAMINATION  Vitals:    02/07/19 1846   BP: 124/62   Pulse: 72   Resp: 16   Temp: 96.9  F (36.1  C)   SpO2: 97%   Weight: 173 lb (78.5 kg)       Today on physical exam:     GENERAL: Awake, Alert, oriented x3, not in any form of acute distress, answers questions appropriately, follows simple commands, conversant  HEENT: Head is normocephalic with normal hair distribution. No evidence of trauma. Ears: No acute purulent discharge. Eyes: Conjunctivae pink with no scleral jaundice. Nose: Normal mucosa and septum. NECK: Supple with no cervical or supraclavicular lymphadenopathy. Trachea is midline. glasses  CHEST: No tenderness or deformity, no crepitus  LUNG: Clear to auscultation with good chest expansion. There are no crackles or wheezes, normal AP diameter.  BACK: No kyphosis of the thoracic spine. Symmetric, no curvature, ROM normal, no CVA tenderness, no spinal tenderness   CVS: irregularly irregular rhythm, systolic murmur, no rubs, gallops, or heaves,  2+ pulses symmetric in all extremities.  ABDOMEN: Rounded and soft, nontender to palpation, non distended, no masses, no organomegaly, good bowel sounds, no rebound or guarding, no peritoneal signs.   EXTREMITIES: 1+ LLE pedal edema, Left hip two incisions c/d/i, Left hip pain and tenderness with ROM  SKIN: Warm and dry, scattered ecchymosis, two small abrasions to Left forearm and elbow  NEUROLOGICAL: The patient is oriented to person, place and time but is forgetful at times. No movement disorder, no arm drift, visual changes, no facial droop, unilateral weakness, mild numbness and tingling in left leg.             LABS:   Recent Results (from the past 168 hour(s))   POCT Glucose   Result Value Ref Range    Glucose 97 70 - 139 mg/dL   Basic Metabolic Panel   Result Value Ref Range    Sodium 138 136 - 145 mmol/L    Potassium 3.8 3.5 - 5.0 mmol/L    Chloride 104 98 - 107  mmol/L    CO2 26 22 - 31 mmol/L    Anion Gap, Calculation 8 5 - 18 mmol/L    Glucose 87 70 - 125 mg/dL    Calcium 8.7 8.5 - 10.5 mg/dL    BUN 20 8 - 28 mg/dL    Creatinine 0.97 0.70 - 1.30 mg/dL    GFR MDRD Af Amer >60 >60 mL/min/1.73m2    GFR MDRD Non Af Amer >60 >60 mL/min/1.73m2   HM1 (CBC with Diff)   Result Value Ref Range    WBC 9.3 4.0 - 11.0 thou/uL    RBC 3.28 (L) 4.40 - 6.20 mill/uL    Hemoglobin 8.7 (L) 14.0 - 18.0 g/dL    Hematocrit 29.8 (L) 40.0 - 54.0 %    MCV 91 80 - 100 fL    MCH 26.5 (L) 27.0 - 34.0 pg    MCHC 29.2 (L) 32.0 - 36.0 g/dL    RDW 19.1 (H) 11.0 - 14.5 %    Platelets 393 140 - 440 thou/uL    MPV 11.6 8.5 - 12.5 fL    Neutrophils % 74 (H) 50 - 70 %    Lymphocytes % 16 (L) 20 - 40 %    Monocytes % 7 2 - 10 %    Eosinophils % 2 0 - 6 %    Basophils % 0 0 - 2 %    Neutrophils Absolute 6.8 2.0 - 7.7 thou/uL    Lymphocytes Absolute 1.5 0.8 - 4.4 thou/uL    Monocytes Absolute 0.7 0.0 - 0.9 thou/uL    Eosinophils Absolute 0.2 0.0 - 0.4 thou/uL    Basophils Absolute 0.0 0.0 - 0.2 thou/uL   Hemoglobin   Result Value Ref Range    Hemoglobin 8.0 (L) 14.0 - 18.0 g/dL   Hemoglobin   Result Value Ref Range    Hemoglobin 7.6 (L) 14.0 - 18.0 g/dL     Results for orders placed or performed in visit on 02/05/19   Basic Metabolic Panel   Result Value Ref Range    Sodium 138 136 - 145 mmol/L    Potassium 3.8 3.5 - 5.0 mmol/L    Chloride 104 98 - 107 mmol/L    CO2 26 22 - 31 mmol/L    Anion Gap, Calculation 8 5 - 18 mmol/L    Glucose 87 70 - 125 mg/dL    Calcium 8.7 8.5 - 10.5 mg/dL    BUN 20 8 - 28 mg/dL    Creatinine 0.97 0.70 - 1.30 mg/dL    GFR MDRD Af Amer >60 >60 mL/min/1.73m2    GFR MDRD Non Af Amer >60 >60 mL/min/1.73m2         Lab Results   Component Value Date    WBC 9.3 02/05/2019    HGB 7.6 (L) 02/07/2019    HCT 29.8 (L) 02/05/2019    MCV 91 02/05/2019     02/05/2019       Lab Results   Component Value Date    [id number] 637 01/28/2019     No results found for: HGBA1C  Lab Results    Component Value Date    INR 1.18 (H) 01/26/2019    INR 1.27 (H) 07/23/2018    INR 1.21 (H) 05/16/2018     No results found for: [id number]  Lab Results   Component Value Date    TSH 3.19 01/28/2019           ASSESSMENT/PLAN:    1. Subdural hematoma, fall: No headaches, visual changes, no neuro deficits noted. Neuro checks daily. F/u with Neurosurgery in 2 weeks with head CT, scheduled for 2/11. Keppra for seizure prophylaxis.   2. Left hip fracture, s/p ORIF: Incisions c/d/i, 1+ edema LLE. Pain controlled. Tylenol three times a day, oxycodone prn. F/u with ortho in 2 weeks. tevin hose. Ice prn. PT, OT.   3. Moderate-severe AS: Was being evaluated for LAAO and TAVR procedure prior to hospital stay. Will need to f/u with cardiology once more stable.   4. BPH, Garcia catheter: On doxazosin. Garcia catheter in place, appears was placed on 1/26 with no void trial inpatient. No h/o urinary retention. Removed on 2/7 and difficulty voiding, only able to void small amounts and pvr 400 and 339. Overnight straight cathed for scan>800cc. Reinsert garcia this morning. Will attempt void trial again on 2/14. Started flomax.   5. A fibrillation: Rate controlled in 70s. On metoprolol. Added hold parameters. No anticoagulants, antiplatelets until cleared per neurosurgery.   6. Macdonald's esophagus, h/o GI bleed: On pantoprazole. No current issues.   7. Iron deficiency anemia, acute blood loss anemia: Follows with hem/onc for weekly/biweekly hg levels, blood transfusions, iron infusions. On folic acid. Hg 8 on 1/29. 8.7 on 2/5. Recheck again one week.   8. CKD stage 3: Cr level 1.03 on 1/30. Cr 0.97 on 2/5. Stable.   9. UTI: On keflex x 10 days.   10. Allergic rhinitis: On flonase.   11. Constipation: On senna docusate two times a day.     Per therapy soft LCD 2/20 EZ stand assist of 1 for transfers, min/max for dressing, ambulates 6 feet in parallel bars with cga, slums today.     Electronically signed by: Homa Smyth NP    Total 25  minutes of which 55% was spent in counseling and coordination of care of the above plan    Time spent in interview and examination of patient, review of available records, and discussion with nursing staff. Continue care plan, efforts at therapy, and monitor nutritional status.

## 2021-06-23 NOTE — TELEPHONE ENCOUNTER
Ching can you add appointment to discharge?      2-11-19 AT 2:00 CT AT [place] and 3:00pm in clinic

## 2021-06-23 NOTE — ANESTHESIA POSTPROCEDURE EVALUATION
Patient: Lopez Mcdermott  INTERNAL FIXATION, FRACTURE, TROCHANTERIC, HIP, USING PINS OR RODS, USING HANA TABLE - has sever aortic stenosis, small subdural hematoma.  awaiting neurosurg clearance, stable per cardiology  Anesthesia type: general    Patient location: PACU  Last vitals:   Vitals:    01/27/19 1900   BP:    Pulse: 74   Resp: 18   Temp: 36.4  C (97.5  F)   SpO2: 97%     Post vital signs: stable  Level of consciousness: lethargic and responds to simple questions  Post-anesthesia pain: pain controlled  Post-anesthesia nausea and vomiting: no  Pulmonary: unassisted, return to baseline  Cardiovascular: stable and blood pressure at baseline  Hydration: adequate  Anesthetic events: no    QCDR Measures:  ASA# 11 - Coby-op Cardiac Arrest: ASA11B - Patient did NOT experience unanticipated cardiac arrest  ASA# 12 - Coby-op Mortality Rate: ASA12B - Patient did NOT die  ASA# 13 - PACU Re-Intubation Rate: ASA13B - Patient did NOT require a new airway mgmt  ASA# 10 - Composite Anes Safety: ASA10A - No serious adverse event    Additional Notes:

## 2021-06-23 NOTE — TELEPHONE ENCOUNTER
Message left for pt's son regarding f/u plan and scheduling/timing of upcoming procedures.  After careful review by the LAAO/TAVR team it is recommended that the pt move forward with the LAAO implant prior to TAVR.  They were instructed to call back at their convenience.

## 2021-06-23 NOTE — ANESTHESIA PREPROCEDURE EVALUATION
Anesthesia Evaluation      Patient summary reviewed   No history of anesthetic complications     Airway   Mallampati: II  Neck ROM: full   Pulmonary - normal exam   (+) shortness of breath,                          Cardiovascular - normal exam  (+) valvular problems/murmurs AS and MS, ,     ECG reviewed        Neuro/Psych      Endo/Other       GI/Hepatic/Renal    (+)   chronic renal disease,           Dental    (+) upper dentures                       Anesthesia Plan  Planned anesthetic: general endotracheal    ASA 3 - emergent   Induction: intravenous   Anesthetic plan and risks discussed with: patient and child/children  Anesthesia plan special considerations: antiemetics, arterial catheterization,   Post-op plan: routine recovery

## 2021-06-23 NOTE — PROGRESS NOTES
CTA and first CTS consult completed. Plan for heart cath and second CTS consult. Pt aware of needing dental clearance.  Luis Fournier RN 1/10/19 1:36 PM      ----- Message -----  From: Pallavi Ward PA-C  Sent: 1/10/2019  12:52 PM  To: AZEEM Liriano said TAVR first on this niesha.  So we can go ahead with cath anytime    Pallavi

## 2021-06-23 NOTE — PROGRESS NOTES
[facility]    DATE: 2019    NAME:  Lopez Mcdermott             :  1929  MRN: [id number]  CODE STATUS:  FULL CODE    VISIT TYPE: Problem Visit (hospital f/u)     FACILITY:  [facility] [[id number]]       CHIEF COMPLAIN/REASON FOR VISIT:    Chief Complaint   Patient presents with     Problem Visit     hospital f/u               HISTORY OF PRESENT ILLNESS: Lopez Mcdermott is a 89 y.o. male who was admitted - for fall and found to have subdural hematoma on CT and Left comminuted intertrochanteric hip fracture. Neurosurgery was consulted and recommended conservative management. He was monitored in ICU and moved to general floor. His blood pressure was optimized <150. Neurology was consulted for possible seizure activity and recommended keppra. He underwent ORIF of Left hip fracture on . He was noted to have mod to severe AS and cardiology was consulted and recommended outpatient eval for TAVR procedure. He was also treated for UTI and completed 7 days of keflex. He was transferred to [facility] for further rehab. He has PMH of moderate to severe AS, BPH, anemia, CKD stage 3, A fib, gupta esophagus. Prior to this he lived at home with his son and daughter in law.     Today Mr. Mcdermott states he is doing pretty well today. He is not having any headaches or changes to vision. His only pain is in his left hip ut feels it is controlled. He thinks his bowels are moving well and he is eating well. His appetite is good and no issues with stomach upset or nausea. He denies any dizziness when he is up but says they have to use the stand to get up him up most o fthe time. He has a little numbness and tingling in his left leg but not too bad. He doesn't think he has a lot of swelling and thinks this has gone down the last few days. It does get worse by the end of the day. He has a catheter in place and denies any concerns with it. He does not recall  having issues urinating prior to his fall and injury but cannot recall if he had the catheter before or not. He says his pain is well controlled and no chest pain or shortness of breath. He says he might get a little winded in therapy but not too bad. He wears glasses but feels his hearing is pretty good. He has some skin tears on his left arm from his fall but otherwise no other wounds. He says he thinks he is doing pretty well for being 89.     REVIEW OF SYSTEMS:  PROBLEMS AND REVIEW OF SYSTEMS:   Review of Systems  Today on ROS:   Currently, no fever, chills, or rigors. Decreased vision and hearing. Denies any chest pain, palpitations, lightheadedness, dizziness. Appetite is good. Denies any GERD symptoms. Denies any difficulty with swallowing, nausea, or vomiting.  Denies any abdominal pain, diarrhea or constipation. No insomnia. No active bleeding. No rash. Positive for left hip incisions, left hip pain, no visual changes, headaches, shortness of breath with exertion, weakness, EZ stand for transfers, garcia catheter, swelling in left leg, numbness left leg      No Known Allergies  Current Outpatient Medications   Medication Sig     acetaminophen (TYLENOL) 500 MG tablet Take 2 tablets (1,000 mg total) by mouth 3 (three) times a day.     cephalexin (KEFLEX) 500 MG capsule Take 1 capsule (500 mg total) by mouth 4 (four) times a day for 10 days.     doxazosin (CARDURA) 4 MG tablet Take 1 tablet (4 mg total) by mouth daily.     fluticasone (FLONASE) 50 mcg/actuation nasal spray 1 spray into each nostril daily.     folic acid (FOLVITE) 1 MG tablet Take 1 tablet (1 mg total) by mouth daily.     levETIRAcetam (KEPPRA) 500 MG tablet Take 1 tablet (500 mg total) by mouth 2 (two) times a day.     metoprolol tartrate (LOPRESSOR) 25 MG tablet Take 1 tablet (25 mg total) by mouth daily.     multivitamin therapeutic tablet Take 1 tablet by mouth daily.     oxyCODONE (ROXICODONE) 5 MG immediate release tablet Take 0.5-1  tablets (2.5-5 mg total) by mouth every 4 (four) hours as needed.     pantoprazole (PROTONIX) 40 MG tablet Take 1 tablet (40 mg total) by mouth daily.     senna-docusate (PERICOLACE) 8.6-50 mg tablet Take 1 tablet by mouth 2 (two) times a day.     Past Medical History:    No past medical history on file.        PHYSICAL EXAMINATION  Vitals:    02/03/19 1927   BP: 125/75   Pulse: 76   Resp: 12   Temp: 98.4  F (36.9  C)   SpO2: 96%   Weight: 173 lb (78.5 kg)       Today on physical exam:     GENERAL: Awake, Alert, oriented x3, not in any form of acute distress, answers questions appropriately, follows simple commands, conversant  HEENT: Head is normocephalic with normal hair distribution. No evidence of trauma. Ears: No acute purulent discharge. Eyes: Conjunctivae pink with no scleral jaundice. Nose: Normal mucosa and septum. NECK: Supple with no cervical or supraclavicular lymphadenopathy. Trachea is midline. glasses  CHEST: No tenderness or deformity, no crepitus  LUNG: Clear to auscultation with good chest expansion. There are no crackles or wheezes, normal AP diameter.  BACK: No kyphosis of the thoracic spine. Symmetric, no curvature, ROM normal, no CVA tenderness, no spinal tenderness   CVS: irregularly irregular rhythm, systolic murmur, no rubs, gallops, or heaves,  2+ pulses symmetric in all extremities.  ABDOMEN: Rounded and soft, nontender to palpation, non distended, no masses, no organomegaly, good bowel sounds, no rebound or guarding, no peritoneal signs.   EXTREMITIES: 1+ LLE pedal edema, Left hip tw incisions c/d/i, Left hip pain and tenderness with ROM  SKIN: Warm and dry, scattered ecchymosis, two small abrasions to Left forearm and elbow  NEUROLOGICAL: The patient is oriented to person, place and time but is forgetful at times. No movement disorder, no arm drift, visual changes, no facial droop, unilateral weakness, mild numbness and tingling in left leg.             LABS:   Recent Results (from the  past 168 hour(s))   POCT Glucose   Result Value Ref Range    Glucose 98 70 - 139 mg/dL   Phosphorus   Result Value Ref Range    Phosphorus 2.5 2.5 - 4.5 mg/dL   Magnesium   Result Value Ref Range    Magnesium 2.2 1.8 - 2.6 mg/dL   HGB   Result Value Ref Range    Hemoglobin 8.0 (L) 14.0 - 18.0 g/dL   Potassium - Next AM   Result Value Ref Range    Potassium 4.0 3.5 - 5.0 mmol/L   POCT Glucose   Result Value Ref Range    Glucose 114 70 - 139 mg/dL   POCT Glucose   Result Value Ref Range    Glucose 114 70 - 139 mg/dL   Phosphorus   Result Value Ref Range    Phosphorus 2.4 (L) 2.5 - 4.5 mg/dL   Magnesium   Result Value Ref Range    Magnesium 2.0 1.8 - 2.6 mg/dL   Potassium - Next AM   Result Value Ref Range    Potassium 4.0 3.5 - 5.0 mmol/L   Creatinine   Result Value Ref Range    Creatinine 1.03 0.70 - 1.30 mg/dL    GFR MDRD Af Amer >60 >60 mL/min/1.73m2    GFR MDRD Non Af Amer >60 >60 mL/min/1.73m2   POCT Glucose   Result Value Ref Range    Glucose 101 70 - 139 mg/dL   Phosphorus   Result Value Ref Range    Phosphorus 2.7 2.5 - 4.5 mg/dL   Magnesium   Result Value Ref Range    Magnesium 2.1 1.8 - 2.6 mg/dL   Phosphorus   Result Value Ref Range    Phosphorus 2.9 2.5 - 4.5 mg/dL   Magnesium   Result Value Ref Range    Magnesium 2.1 1.8 - 2.6 mg/dL   Potassium   Result Value Ref Range    Potassium 4.1 3.5 - 5.0 mmol/L   POCT Glucose   Result Value Ref Range    Glucose 97 70 - 139 mg/dL     Results for orders placed or performed during the hospital encounter of 01/26/19   Basic Metabolic Panel   Result Value Ref Range    Sodium 142 136 - 145 mmol/L    Potassium 4.2 3.5 - 5.0 mmol/L    Chloride 114 (H) 98 - 107 mmol/L    CO2 18 (L) 22 - 31 mmol/L    Anion Gap, Calculation 10 5 - 18 mmol/L    Glucose 84 70 - 125 mg/dL    Calcium 8.0 (L) 8.5 - 10.5 mg/dL    BUN 28 8 - 28 mg/dL    Creatinine 1.08 0.70 - 1.30 mg/dL    GFR MDRD Af Amer >60 >60 mL/min/1.73m2    GFR MDRD Non Af Amer >60 >60 mL/min/1.73m2         Lab Results    Component Value Date    WBC 7.2 01/27/2019    HGB 8.0 (L) 01/29/2019    HCT 31.7 (L) 01/27/2019    MCV 85 01/27/2019     (L) 01/27/2019       Lab Results   Component Value Date    [id number] 637 01/28/2019     No results found for: HGBA1C  Lab Results   Component Value Date    INR 1.18 (H) 01/26/2019    INR 1.27 (H) 07/23/2018    INR 1.21 (H) 05/16/2018     No results found for: [id number]  Lab Results   Component Value Date    TSH 3.19 01/28/2019           ASSESSMENT/PLAN:    1. Subdural hematoma, fall: No headaches, visual changes, no neuro deficits noted. Neuro checks daily. F/u with Neurosurgery in 2 weeks with head CT, scheduled for 2/11. Keppra for seizure prophylaxis.   2. Left hip fracture, s/p ORIF: Incisions c/d/i, 1+ edema LLE. Pain controlled. Tylenol three times a day, oxycodone prn. F/u with ortho in 2 weeks. Ordered tevin hose. Ice prn. PT, OT.   3. Moderate-severe AS: Was being evaluated for LAAO and TAVR procedure prior to hospital stay. Will need to f/u with cardiology once more stable.   4. BPH: On doxazosin. Jimenez catheter in place, appears was placed on 1/26 with no void trial inpatient. No h/o urinary retention. Will need void trial during stay.   5. A fibrillation: Rate controlled in 70s. On metoprolol. Added hold parameters. No anticoagulants, antiplatelets until cleared per neurosurgery.   6. Macdonald's esophagus, h/o GI bleed: On pantoprazole. No current issues.   7. Iron deficiency anemia, acute blood loss anemia: Follows with hem/onc for weekly/biweekly hg levels, blood transfusions, iron infusions. On folic acid. Hg 8 on 1/29.   8. CKD stage 3: Cr level 1.03 on 1/30.   9. UTI: On keflex x 10 days.   10. Allergic rhinitis: On flonase.   11. Constipation: On senna docusate two times a day.     Bmp, hm1 on 2/5    Per therapy eval today    Electronically signed by: Homa Smyth NP    Total 45 minutes of which 55% was spent in counseling and coordination of care of the above  plan    Time spent in interview and examination of patient, review of available records, and discussion with nursing staff. Continue care plan, efforts at therapy, and monitor nutritional status.

## 2021-06-23 NOTE — PROGRESS NOTES
Dx: AS  Plan: eval for possible TAVR  Referred by: Dr. Gracia  Primary cardiologist: Dr. Gracia  PCP: Dr. Colby     Preliminary STS: 3.2 % (update after heart cath if applicable)     Frailty: pending     MINI-COG Assessment: pending          Medical history:  Iron deficiency anemia, presumed to be chronic, ongoing GI bleed: Patient seen in outpatient infusion clinic for labs every several weeks.  If hemoglobin is low, he will either get blood or iron transfusions  AF - not on OAC for reason above.  PUD  Diverticulosis  Macdonald's Esophagus  AAA 4.38 on CT (1/9/19)  CKD (1/9/19 creat 1.3, GFR 52)    Retired . . Lives with son Sebas. Functionally indep.      Echo result: 12/26/18  EF 72%  PALMER 0.6  MG 37  Pvel 38  Di 0.2  Svi 28  Pa pressure 49  No AI, Mild MR, Mod TR       Plan:  Eval for possible TAVR, then LAAO in future  CTA and first CTS consult completed  Dr. Luna 1/24/19  Heart Cath 2/1 with Dr. Gracia  Pt aware of needing dental clearance            Luis Fournier RN  [facility]

## 2021-06-23 NOTE — TELEPHONE ENCOUNTER
Ching,   Do you know what TCU patient was discharged to? Also, can you please find appointment date and time for patient. The time cant be before 9:00a as TCU is unable to transport that early.

## 2021-06-23 NOTE — TELEPHONE ENCOUNTER
Ching can you add appointment to discharge?        2-11-19 AT 2:00 CT AT [place] and 3:00pm in clinic

## 2021-06-23 NOTE — PROGRESS NOTES
Discussed with Dr. Beck and Dr. Gracia. Continue with heart cath as scheduled for 2/1/19. Pt would benefit from getting LAAO prior to TAVR to eliminate need for triple therapy. Notified LAAO coordinator. She has met with patient and son in the past regarding LAAO and will call them with update in plan.

## 2021-06-23 NOTE — TELEPHONE ENCOUNTER
[facility] Nurse Triage Telephone Note      Provider: JOHN Grey  Facility: [facility]    Facility Type: TCU    Caller: Felipa   Call Back Number:  [phone]    Allergies: Patient has no known allergies.    Reason for call: Hgb of 7.6 was to be done 2/11. Last Hgb was 2/6/19 8.0 New order for FE 325mg two times a day and a Hgb on 2/11     Verbal Order/Direction given by Provider: NNO    Provider giving order: JOHN Grey    Verbal order given to: Nhung Gibbs RN

## 2021-06-23 NOTE — PROGRESS NOTES
Hgb 10.1 today and patient reports feeling well. No need to transfusion today. He expressed understanding. Maite Escobar

## 2021-06-23 NOTE — PROGRESS NOTES
[facility]    DATE: 2019    NAME:  Lopez Mcdermott             :  1929  MRN: [id number]  CODE STATUS:  FULL CODE    VISIT TYPE: Problem Visit (garcia catheter, pain)     FACILITY:  [facility] [[id number]]       CHIEF COMPLAIN/REASON FOR VISIT:    Chief Complaint   Patient presents with     Problem Visit     garcia catheter, pain               HISTORY OF PRESENT ILLNESS: Lopez Mcdermott is a 89 y.o. male who was admitted - for fall and found to have subdural hematoma on CT and Left comminuted intertrochanteric hip fracture. Neurosurgery was consulted and recommended conservative management. He was monitored in ICU and moved to general floor. His blood pressure was optimized <150. Neurology was consulted for possible seizure activity and recommended keppra. He underwent ORIF of Left hip fracture on . He was noted to have mod to severe AS and cardiology was consulted and recommended outpatient eval for TAVR procedure. He was also treated for UTI and completed 7 days of keflex. [facility]. He has PMH of moderate to severe AS, BPH, anemia, CKD stage 3, A fib, gupta esophagus. Prior to this he lived at home with his son and daughter in law.     Today Mr. Mcdermott states he had a rough night because of pain. He says he had a hard time getting into a comfortable position. He did take a pain pill and this helped some but he could use another one now. He says he did not sleep much last night because of the discomfort. He says otherwise therapy went ok yesterday and they are still using the lift thing to get him up but he thinks he is making some progress and getting stronger. He ate pretty well yesterday and no nausea or stomach upset. He is agreeable to taking the garcia out later this week and having him try to pee on his own. He agrees he doesn't want this in any longer than he needs and understands the  risk of infection.     REVIEW OF SYSTEMS:  PROBLEMS AND REVIEW OF SYSTEMS:   Review of Systems  Today on ROS:   Currently, no fever, chills, or rigors. Decreased vision and hearing. Denies any chest pain, palpitations, lightheadedness, dizziness. Appetite is good. Denies any GERD symptoms. Denies any difficulty with swallowing, nausea, or vomiting.  Denies any abdominal pain, diarrhea or constipation. No active bleeding. No rash. Positive for left hip incisions, left hip pain, no visual changes, headaches, shortness of breath with exertion, weakness, EZ stand for transfers, garcia catheter, swelling in left leg, numbness left leg, insomnia      No Known Allergies  Current Outpatient Medications   Medication Sig     acetaminophen (TYLENOL) 500 MG tablet Take 2 tablets (1,000 mg total) by mouth 3 (three) times a day.     cephalexin (KEFLEX) 500 MG capsule Take 1 capsule (500 mg total) by mouth 4 (four) times a day for 10 days.     doxazosin (CARDURA) 4 MG tablet Take 1 tablet (4 mg total) by mouth daily.     fluticasone (FLONASE) 50 mcg/actuation nasal spray 1 spray into each nostril daily.     folic acid (FOLVITE) 1 MG tablet Take 1 tablet (1 mg total) by mouth daily.     levETIRAcetam (KEPPRA) 500 MG tablet Take 1 tablet (500 mg total) by mouth 2 (two) times a day.     metoprolol tartrate (LOPRESSOR) 25 MG tablet Take 1 tablet (25 mg total) by mouth daily.     multivitamin therapeutic tablet Take 1 tablet by mouth daily.     oxyCODONE (ROXICODONE) 5 MG immediate release tablet Take 0.5-1 tablets (2.5-5 mg total) by mouth every 4 (four) hours as needed.     pantoprazole (PROTONIX) 40 MG tablet Take 1 tablet (40 mg total) by mouth daily.     senna-docusate (PERICOLACE) 8.6-50 mg tablet Take 1 tablet by mouth 2 (two) times a day.     Past Medical History:    No past medical history on file.        PHYSICAL EXAMINATION  Vitals:    02/05/19 1000   BP: 142/66   Pulse: 98   Resp: 12   Temp: (!) 76  F (24.4  C)   SpO2: 96%    Weight: 173 lb (78.5 kg)       Today on physical exam:     GENERAL: Awake, Alert, oriented x3, not in any form of acute distress, answers questions appropriately, follows simple commands, conversant  HEENT: Head is normocephalic with normal hair distribution. No evidence of trauma. Ears: No acute purulent discharge. Eyes: Conjunctivae pink with no scleral jaundice. Nose: Normal mucosa and septum. NECK: Supple with no cervical or supraclavicular lymphadenopathy. Trachea is midline. glasses  CHEST: No tenderness or deformity, no crepitus  LUNG: Clear to auscultation with good chest expansion. There are no crackles or wheezes, normal AP diameter.  BACK: No kyphosis of the thoracic spine. Symmetric, no curvature, ROM normal, no CVA tenderness, no spinal tenderness   CVS: irregularly irregular rhythm, systolic murmur, no rubs, gallops, or heaves,  2+ pulses symmetric in all extremities.  ABDOMEN: Rounded and soft, nontender to palpation, non distended, no masses, no organomegaly, good bowel sounds, no rebound or guarding, no peritoneal signs.   EXTREMITIES: 1+ LLE pedal edema, Left hip two incisions c/d/i, Left hip pain and tenderness with ROM  SKIN: Warm and dry, scattered ecchymosis, two small abrasions to Left forearm and elbow  NEUROLOGICAL: The patient is oriented to person, place and time but is forgetful at times. No movement disorder, no arm drift, visual changes, no facial droop, unilateral weakness, mild numbness and tingling in left leg.             LABS:   Recent Results (from the past 168 hour(s))   POCT Glucose   Result Value Ref Range    Glucose 114 70 - 139 mg/dL   POCT Glucose   Result Value Ref Range    Glucose 114 70 - 139 mg/dL   Phosphorus   Result Value Ref Range    Phosphorus 2.4 (L) 2.5 - 4.5 mg/dL   Magnesium   Result Value Ref Range    Magnesium 2.0 1.8 - 2.6 mg/dL   Potassium - Next AM   Result Value Ref Range    Potassium 4.0 3.5 - 5.0 mmol/L   Creatinine   Result Value Ref Range     Creatinine 1.03 0.70 - 1.30 mg/dL    GFR MDRD Af Amer >60 >60 mL/min/1.73m2    GFR MDRD Non Af Amer >60 >60 mL/min/1.73m2   POCT Glucose   Result Value Ref Range    Glucose 101 70 - 139 mg/dL   Phosphorus   Result Value Ref Range    Phosphorus 2.7 2.5 - 4.5 mg/dL   Magnesium   Result Value Ref Range    Magnesium 2.1 1.8 - 2.6 mg/dL   Phosphorus   Result Value Ref Range    Phosphorus 2.9 2.5 - 4.5 mg/dL   Magnesium   Result Value Ref Range    Magnesium 2.1 1.8 - 2.6 mg/dL   Potassium   Result Value Ref Range    Potassium 4.1 3.5 - 5.0 mmol/L   POCT Glucose   Result Value Ref Range    Glucose 97 70 - 139 mg/dL     Results for orders placed or performed during the hospital encounter of 01/26/19   Basic Metabolic Panel   Result Value Ref Range    Sodium 142 136 - 145 mmol/L    Potassium 4.2 3.5 - 5.0 mmol/L    Chloride 114 (H) 98 - 107 mmol/L    CO2 18 (L) 22 - 31 mmol/L    Anion Gap, Calculation 10 5 - 18 mmol/L    Glucose 84 70 - 125 mg/dL    Calcium 8.0 (L) 8.5 - 10.5 mg/dL    BUN 28 8 - 28 mg/dL    Creatinine 1.08 0.70 - 1.30 mg/dL    GFR MDRD Af Amer >60 >60 mL/min/1.73m2    GFR MDRD Non Af Amer >60 >60 mL/min/1.73m2         Lab Results   Component Value Date    WBC 7.2 01/27/2019    HGB 8.0 (L) 01/29/2019    HCT 31.7 (L) 01/27/2019    MCV 85 01/27/2019     (L) 01/27/2019       Lab Results   Component Value Date    NRCBQDGO02 637 01/28/2019     No results found for: HGBA1C  Lab Results   Component Value Date    INR 1.18 (H) 01/26/2019    INR 1.27 (H) 07/23/2018    INR 1.21 (H) 05/16/2018     No results found for: [id number]  Lab Results   Component Value Date    TSH 3.19 01/28/2019           ASSESSMENT/PLAN:    1. Subdural hematoma, fall: No headaches, visual changes, no neuro deficits noted. Neuro checks daily. F/u with Neurosurgery in 2 weeks with head CT, scheduled for 2/11. Keppra for seizure prophylaxis.   2. Left hip fracture, s/p ORIF: Incisions c/d/i, 1+ edema LLE. Pain controlled. Tylenol three  times a day, oxycodone prn. F/u with ortho in 2 weeks. tevin hose. Ice prn. PT, OT.   3. Moderate-severe AS: Was being evaluated for LAAO and TAVR procedure prior to hospital stay. Will need to f/u with cardiology once more stable.   4. BPH, Jimenez catheter: On doxazosin. Jimenez catheter in place, appears was placed on 1/26 with no void trial inpatient. No h/o urinary retention. Discussed with patient and will remove on 2/7 for void trial. PVR x 2, bladders scan q6h prn unable to void and straight cath if >250cc.   5. A fibrillation: Rate controlled in 70s. On metoprolol. Added hold parameters. No anticoagulants, antiplatelets until cleared per neurosurgery.   6. Macdonald's esophagus, h/o GI bleed: On pantoprazole. No current issues.   7. Iron deficiency anemia, acute blood loss anemia: Follows with hem/onc for weekly/biweekly hg levels, blood transfusions, iron infusions. On folic acid. Hg 8 on 1/29. 8.7 on 2/5. Recheck again one week.   8. CKD stage 3: Cr level 1.03 on 1/30. Cr 0.97 on 2/5. Stable.   9. UTI: On keflex x 10 days.   10. Allergic rhinitis: On flonase.   11. Constipation: On senna docusate two times a day.     Per therapy soft LCD 2/20 EZ stand assist of 1 for transfers, min/max for dressing, ambulates 6 feet in parallel bars with cga, slums today.     Electronically signed by: Homa Smyth NP    Total 25 minutes of which 55% was spent in counseling and coordination of care of the above plan    Time spent in interview and examination of patient, review of available records, and discussion with nursing staff. Continue care plan, efforts at therapy, and monitor nutritional status.

## 2021-06-23 NOTE — ANESTHESIA PROCEDURE NOTES
Arterial Line  Reason for Procedure: hemodynamic monitoring  Patient location during procedure: Pre-op  Start time: 1/27/2019 3:55 PM  End time: 1/27/2019 4:02 PM  Staffing:  Performing  Anesthesiologist: Cheyanne Cisneros MD  Performing CRNA: Kunal Mireles CRNA  Sterile Precautions:  sterile barriers used during insertion: cap, mask, sterile gloves, large sheet, and hand hygiene used.  Arterial Line:   Immediately prior to procedure a time out was called to verify the correct patient, procedure, equipment, support staff and site/side marked as required  Laterality: left  Location: radial  Prepped with: ChloroPrep    Needle gauge: 20 G  Number of Attempts: 1  Secured with: tape, transparent dressing and pressure dressing  Flushed with: saline  1% lidocaine local anesthesia used for skin prep.   See MAR for additional medications given.

## 2021-06-23 NOTE — ANESTHESIA CARE TRANSFER NOTE
Last vitals:   Vitals:    01/27/19 1729   BP: 127/50   Pulse: 75   Resp: 12   Temp: 36.5  C (97.7  F)   SpO2: 100%     Patient's level of consciousness is drowsy  Spontaneous respirations: yes  Maintains airway independently: yes  Dentition unchanged: yes  Oropharynx: oropharynx clear of all foreign objects    QCDR Measures:  ASA# 20 - Surgical Safety Checklist: WHO surgical safety checklist completed prior to induction    PQRS# 430 - Adult PONV Prevention: 4558F - Pt received => 2 anti-emetic agents (different classes) preop & intraop  ASA# 8 - Peds PONV Prevention: NA - Not pediatric patient, not GA or 2 or more risk factors NOT present  PQRS# 424 - Coby-op Temp Management: 4559F - At least one body temp DOCUMENTED => 35.5C or 95.9F within required timeframe  PQRS# 426 - PACU Transfer Protocol: - Transfer of care checklist used  ASA# 14 - Acute Post-op Pain: ASA14B - Patient did NOT experience pain >= 7 out of 10

## 2021-06-23 NOTE — CONSULTS
DATE OF SERVICE: 01/24/2019    REFERRING CARDIOLOGIST:  Dr. Reggie Gracia    REASON FOR CONSULTATION:  The patient is an 89-year-old gentleman with severe aortic  stenosis.    HISTORY OF PRESENT ILLNESS:  Mr. Mcdermott is an 89-year-old gentleman with severe  aortic stenosis.  He has had multiple hospitalizations over the course of the last  year for GI bleeding that is thought to be related to diverticulosis.  He has iron  deficiency anemia and has a history of peptic ulcer disease, gastric polyps and  chronic kidney disease.  He has chronic atrial fibrillation related to valvular  heart disease.  He also has moderate calcific mitral stenosis and moderate tricuspid  regurgitation.  He reports that he has not had any chest pain, syncope or  presyncope, but does say that he gets occasionally somewhat lightheaded.  He also  denies any dyspnea on exertion unless he walks up stairs.    PAST SURGICAL AND MEDICAL HISTORY:  SURGICAL PROCEDURES:  1.  Status post TURP in 2006 and again in 2009.  2.  Bilateral inguinal hernia repair in 2001.    3.  Multiple upper GI endoscopies and colonoscopies.    4.  He also had cervical fusions x2 in 1971 and 1977.    MEDICAL PROBLEMS:  1.  Severe aortic stenosis.  2.  Moderate mitral stenosis.  3.  Peptic ulcer disease.  4.  BPH.  5.  Diverticulosis.  6.  Chronic kidney disease.  7.  Atrial fibrillation.  8.  Lower GI bleed.  9.  Iron deficiency anemia.  10.  Hiatal hernia with Macdonald's esophagus.  11.  Gastric polyps.    ALLERGIES:  None known.    CURRENT MEDICATIONS:  See chart.    FAMILY HISTORY:  Positive for heart disease in his father and arthritis in a son and  asthma in 2 sons.    SOCIAL HISTORY:  He is retired.  He is accompanied by his son.  He does not smoke or  ingest alcohol.    REVIEW OF SYSTEMS:  A complete 10-point review of systems was obtained and is  negative other than the above.    PHYSICAL EXAMINATION:  APPEARANCE:  Slender frail elderly gentleman in no acute  distress.  Height is 5 feet  8 inches, weight is 76.2 kilograms.  VITAL SIGNS:  Temperature afebrile, respiratory rate 18, heart rate 76, blood  pressure 102/68.  SKIN:  Pale without malignant appearing lesions.  LYMPH NODES:  No palpable lymphadenopathy.  HEENT:  Normocephalic.  PERRL.  EOMs intact.  ENT unremarkable.  NECK:  Supple, without carotid bruits.  CHEST:  Without deformity.  LUNGS:  Clear to auscultation.  HEART:  Irregularly irregular with a grade 2/6 systolic ejection murmur heard best  over the right upper sternal border.  Pulses 2+ and symmetrical.  ABDOMEN:  Soft, nontender, without palpable organomegaly, normoactive bowel sounds.  GENITOURINARY AND RECTAL:  Deferred.  NEUROLOGIC:  Grossly intact.  BACK:  Without deformity.  EXTREMITIES:  Full range of motion without clubbing, cyanosis, edema.    LABORATORY:  Remarkable for a hemoglobin of 9.4, platelets of 141,000.  Creatinine  1.17.  EKG shows atrial fibrillation and septal infarct.  Transthoracic echo shows  moderate to severe aortic stenosis with moderate calcific mitral stenosis with mild  regurgitation and moderate tricuspid valve regurgitation, mild pulmonary  hypertension is present, biatrial volume is moderately increased.  The mean gradient  across his valve is 37 with an SVI of 28 and a dimensionless index of 0.2.    ASSESSMENT:  Mr. Mcdermott is an 89-year-old gentleman with severe aortic stenosis.  I  believe he would benefit from aortic valve replacement.  Because of the patient's  age and high SDS risk for mortality with isolated aortic valve replacement, I  recommend a transcatheter aortic valve replacement.  The risks and benefits of this  procedure were described to the patient and his son.  They understand that potential  complications include bleeding, infection, stroke, heart block requiring a permanent  pacemaker, cardiac perforation, aortic root rupture, aortic dissection and an  operative mortality of about 2%.  I agree with  the plan of this patient undergoing  transcatheter aortic valve replacement.      MD Chika PATRICK 01/24/2019 17:53:01  T 01/24/2019 20:25:19  R 01/24/2019 20:25:19  [id number]        cc:CEFERINO GUARDADO MD

## 2021-06-24 NOTE — PROGRESS NOTES
[facility]    DATE: 2019    NAME:  Lopez Mcdermott             :  1929  MRN: [id number]  CODE STATUS:  FULL CODE    VISIT TYPE: Review Of Multiple Medical Conditions     FACILITY:  [facility] [[id number]]       CHIEF COMPLAIN/REASON FOR VISIT:    Chief Complaint   Patient presents with     Review Of Multiple Medical Conditions               HISTORY OF PRESENT ILLNESS: Lopez Mcdermott is a 89 y.o. male who was admitted - for fall and found to have subdural hematoma on CT and Left comminuted intertrochanteric hip fracture. Neurosurgery was consulted and recommended conservative management. He was monitored in ICU and moved to general floor. His blood pressure was optimized <150. Neurology was consulted for possible seizure activity and recommended keppra. He underwent ORIF of Left hip fracture on . He was noted to have mod to severe AS and cardiology was consulted and recommended outpatient eval for TAVR procedure. He was also treated for UTI and completed 7 days of keflex. He was transferred to [facility] for further rehab. He has PMH of moderate to severe AS, BPH, anemia, CKD stage 3, A fib, gupta esophagus. Prior to this he lived at home with his son and daughter in law.     Today Mr. Mcdermott is seen laying in bed this morning. He reports he needs to go to the bathroom because he filled both urinals during the night. He says he is still able to urinate some on his own and during the day they are still straight cathing him. He says he did see the blood doctor earlier this week but did not have an infusion that he can recall. He says he is not having any dizziness or lightheadedness. He did have some pain in his hip overnight but feels it is doing ok overall. He says he sleeps well except that he has to urinate frequently. He says his appetite is good and therapy is going fine. He has no new concerns. Reviewed notes from hematology  appt 2/19 and recommended iron two times a day. This had been stopped per his son's request but hg was improved this week at 9.6 so will restart. No iron infusion needed at this time as labs were stable. F/u again with hematology in April.    REVIEW OF SYSTEMS:  PROBLEMS AND REVIEW OF SYSTEMS:   Review of Systems  Today on ROS:   Currently, no fever, chills, or rigors. Decreased vision and hearing. Denies any chest pain, palpitations, lightheadedness, dizziness. Appetite is good. Denies any GERD symptoms. Denies any difficulty with swallowing, nausea, or vomiting.  Denies any abdominal pain, diarrhea or constipation. No active bleeding. No rash. Positive for left hip incisions, left hip pain, no visual changes, headaches, shortness of breath with exertion, swelling in left leg, numbness left leg, urinary retention-straight cath 4 times daily      No Known Allergies  Current Outpatient Medications   Medication Sig     acetaminophen (TYLENOL) 500 MG tablet Take 2 tablets (1,000 mg total) by mouth 3 (three) times a day. (Patient taking differently: Take 1,000 mg by mouth every 6 (six) hours as needed.       )     doxazosin (CARDURA) 4 MG tablet Take 1 tablet (4 mg total) by mouth daily.     ferrous sulfate 325 (65 FE) MG tablet Take 1 tablet by mouth 2 (two) times a day.     fluticasone (FLONASE) 50 mcg/actuation nasal spray 1 spray into each nostril daily.     levETIRAcetam (KEPPRA) 500 MG tablet Take 1 tablet (500 mg total) by mouth 2 (two) times a day.     metoprolol tartrate (LOPRESSOR) 25 MG tablet Take 1 tablet (25 mg total) by mouth daily.     multivitamin therapeutic tablet Take 1 tablet by mouth daily.     pantoprazole (PROTONIX) 40 MG tablet Take 1 tablet (40 mg total) by mouth daily.     senna-docusate (PERICOLACE) 8.6-50 mg tablet Take 1 tablet by mouth 2 (two) times a day.     tamsulosin (FLOMAX) 0.4 mg cap Take 0.4 mg by mouth.     Past Medical History:    No past medical history on file.        PHYSICAL  EXAMINATION  Vitals:    02/20/19 2015   BP: 114/64   Pulse: 68   Resp: 12   Temp: 96.7  F (35.9  C)   SpO2: 97%   Weight: 166 lb (75.3 kg)       Today on physical exam:     GENERAL: Awake, Alert, oriented x3, not in any form of acute distress, answers questions appropriately, follows simple commands, conversant  HEENT: Head is normocephalic with normal hair distribution. No evidence of trauma. Ears: No acute purulent discharge. Eyes: Conjunctivae pink with no scleral jaundice. Nose: Normal mucosa and septum. NECK: Supple with no cervical or supraclavicular lymphadenopathy. Trachea is midline. glasses  CHEST: No tenderness or deformity, no crepitus  LUNG: Clear to auscultation with good chest expansion. There are no crackles or wheezes, normal AP diameter.  BACK: No kyphosis of the thoracic spine. Symmetric, no curvature, ROM normal, no CVA tenderness, no spinal tenderness   CVS: irregularly irregular rhythm, systolic murmur, no rubs, gallops, or heaves,  2+ pulses symmetric in all extremities.  ABDOMEN: Rounded and soft, nontender to palpation, non distended, no masses, no organomegaly, good bowel sounds, no rebound or guarding, no peritoneal signs.   EXTREMITIES: trace LLE pedal edema, Left hip two incisions c/d/i, Left hip pain and tenderness with ROM  SKIN: Warm and dry,   NEUROLOGICAL: The patient is oriented to person, place and time but is forgetful at times. No movement disorder, no arm drift, visual changes, no facial droop, unilateral weakness, mild numbness and tingling in left leg.             LABS:   Recent Results (from the past 168 hour(s))   HM2 (CBC W/O DIFF)   Result Value Ref Range    WBC 9.1 4.0 - 11.0 thou/uL    RBC 3.48 (L) 4.40 - 6.20 mill/uL    Hemoglobin 9.6 (L) 14.0 - 18.0 g/dL    Hematocrit 31.9 (L) 40.0 - 54.0 %    MCV 92 80 - 100 fL    MCH 27.6 27.0 - 34.0 pg    MCHC 30.1 (L) 32.0 - 36.0 g/dL    RDW 19.6 (H) 11.0 - 14.5 %    Platelets 256 140 - 440 thou/uL    MPV 9.6 8.5 - 12.5 fL    Ferritin   Result Value Ref Range    Ferritin 93 27 - 300 ng/mL   Iron and Transferrin Iron Binding Capacity   Result Value Ref Range    Iron 40 (L) 42 - 175 ug/dL    Transferrin 227 212 - 360 mg/dL    Transferrin Saturation, Calculated 14 (L) 20 - 50 %    Transferrin IBC, Calculated 283 (L) 313 - 563 ug/dL     Results for orders placed or performed in visit on 02/05/19   Basic Metabolic Panel   Result Value Ref Range    Sodium 138 136 - 145 mmol/L    Potassium 3.8 3.5 - 5.0 mmol/L    Chloride 104 98 - 107 mmol/L    CO2 26 22 - 31 mmol/L    Anion Gap, Calculation 8 5 - 18 mmol/L    Glucose 87 70 - 125 mg/dL    Calcium 8.7 8.5 - 10.5 mg/dL    BUN 20 8 - 28 mg/dL    Creatinine 0.97 0.70 - 1.30 mg/dL    GFR MDRD Af Amer >60 >60 mL/min/1.73m2    GFR MDRD Non Af Amer >60 >60 mL/min/1.73m2         Lab Results   Component Value Date    WBC 9.1 02/19/2019    HGB 9.6 (L) 02/19/2019    HCT 31.9 (L) 02/19/2019    MCV 92 02/19/2019     02/19/2019       Lab Results   Component Value Date    [id number] 637 01/28/2019     No results found for: HGBA1C  Lab Results   Component Value Date    INR 1.18 (H) 01/26/2019    INR 1.27 (H) 07/23/2018    INR 1.21 (H) 05/16/2018     No results found for: [id number]  Lab Results   Component Value Date    TSH 3.19 01/28/2019           ASSESSMENT/PLAN:    1. Subdural hematoma, fall: No headaches, visual changes, no neuro deficits noted. Neuro checks daily. F/u with Neurosurgery in 2 weeks with head CT, scheduled for 2/11-apparently missed CT appt, now rescheduled for 3/1. Continue off aspirin and continue keppra for seizure prophylaxis until then.   2. Left hip fracture, s/p ORIF: Incisions c/d/i, 1+ edema LLE. Pain controlled. Tylenol three times a day, oxycodone prn. F/u with ortho in 2 weeks-no appt scheduled, needs f/u appt. tevin hose. Ice prn. PT, OT. Needs f/u appt scheduled. Reviewed oxycodone and not used since 2/19. Will dc and change tylenol to prn only. Doing much better.   3.  Moderate-severe AS: Was being evaluated for LAAO and TAVR procedure prior to hospital stay. Will need to f/u with cardiology once more stable.   4. BPH, Jimenez catheter: On doxazosin and added flomas. Jimenez catheter in place, appears was placed on 1/26 with no void trial inpatient. No h/o urinary retention in medical records however pt now reporting that he had been straight cathing himself at home as needed but prior to hospital stay was up to 4 times per day. Failed void trial 2/7 and 2/14. Scheduled straight cath four times a day as doing at home and f/u with urology after discharge. No recent concerns, staff assisting patient in doing.   5. A fibrillation: Rate controlled in 60s. On metoprolol. hold parameters. No anticoagulants, antiplatelets until cleared per neurosurgery.   6. Macdonald's esophagus, h/o GI bleed: On pantoprazole. No current issues.   7. Iron deficiency anemia, acute blood loss anemia: Follows with hem/onc for weekly/biweekly hg levels, blood transfusions, iron infusions. On folic acid. Hg 8 on 1/29. 8.7 on 2/5. HG 8.5 on 2/11, stable. Appt with hematology and infusion clinic on 2/19-iron and transferrin, ferritin stable no need for iv infusion of iron. Hg improved to 9.6. Recommended iron two times a day so this was restarted. F/u again on 4/2. Recommended gi follow up.   8. CKD stage 3: Cr level 1.03 on 1/30. Cr 0.97 on 2/5. Stable.   9. UTI: completed keflex.   10. Allergic rhinitis: On flonase.   11. Constipation: On senna docusate two times a day.     Per therapy soft LCD 3/4 CGA for toileting and dressing, ambulates 20-35 feet with cga, 11 on slums, 4.2 on cpt.     Electronically signed by: Homa Smyth NP    Time spent in interview and examination of patient, review of available records, and discussion with nursing staff. Continue care plan, efforts at therapy, and monitor nutritional status.

## 2021-06-24 NOTE — PROGRESS NOTES
[facility] Hematology and Oncology Progress Note    Patient: Lopez Mcdermott  MRN: [id number]  Date of Service: 02/19/2019        Reason for Visit    Follow-up regarding iron deficiency anemia.    Assessment and Plan      ECOG Performance    1    Distress Assessment   : See the discussion below.    Pain   : No pain.    Mr. Lopez Mcdermott is a 89 y.o. gentleman with a long-standing history of iron deficiency anemia which appears to be due to GI bleeding.  Most of his workup was done in the [facility] system.  He has transferred his care to the [facility] system this year.  The first episode of GI bleeding was in 1998 due to gastric ulcers.  Over the last few years he has had multiple endoscopic procedures done.  Apparently he had a capsule endoscopy done on 5/14/18 which did not show any specific site of bleeding.  He has a history of having Macdonald's esophagus, hiatal hernia and gastric polyps but in the upper endoscopies done over the last 1 or 2 years no specific site of bleeding was found.  He also has a history of extensive diverticulosis.  In the colonoscopy that was done on 4/18/18 there were extensive diverticular lesions seen in at least one diverticulum with evidence of recent bleeding.  In the colonoscopy that was done on 5/2/18 black material was found in the colon but it was not clear to the endoscopist will address melena or whether it was due to oral iron.  His blood work has shown iron deficiency consistently.  In the workup that we have done so far we have not found any evidence of other hematological problems.  The peripheral blood smear has not shown any dysplastic cells that will raise the possibility of myelodysplasia.  Once he receives iron he does have active reticulocytosis also.    He was doing well and his hemoglobin had increased up to 12.1 on 9/19/18 when suddenly his hemoglobin came down to the 6 range and he had to be admitted to the hospital again on 1/5/18 and needed 4 units of RBC  transfusion to relieve his symptoms.  When we look at the labs clearly he was again iron deficient in spite of the 10 Venofer infusions that he received plus the oral iron supplementation.  In the previous workup we have not seen any evidence of malabsorption.  In fact he has had duodenal biopsies done in the past which did not show any evidence of celiac disease.  Clearly this is due to blood loss.  With his history I am very sure that this is due to GI blood loss.  The only question is which site is bleeding.  From the hematology point of view, what I can do is to try to keep him replete with iron and vitamins.  With that, his body may be able to make blood to compensate for the GI bleeding.  However I am quite sure that intermittently there will be episodes where he will have increased blood loss which would make him acutely anemic.  In that situation the only option will be to admit him again to the hospital and support him with blood transfusions.  He was treated with Feraheme infusions on 11/13/18 and on 11/19/18.  After that when he came back for follow-up on 12/11/18 hemoglobin had improved to 9.4.    1.  He was scheduled for follow-up with us in early January.  He did not show up for that.  He does not appear that the referral to gastroenterology at the [facility] has also taken place.  I see that there was consideration of doing a TAVR for his aortic valve disease and possibly a watchman procedure between cardiology and thoracic surgery in January.  Unfortunately he had a fall and suffered a subdural hematoma and a left hip fracture on 1/26/19.  The subdural hematoma was treated conservatively.  He had pinning of the left hip done.  He was discharged from the hospital on 1/31/19 and he is now in a TCU undergoing rehabilitation.  He appears to have some problems with memory now.  This appears to be more than what he had when I last saw him.    2.  Blood counts from today were reviewed.  Hemoglobin  is up to 9.6.  WBC count and platelet count are normal.  He does not need a blood transfusion today.    3.  We will obtain a ferritin level and a TIBC panel today to see whether he is iron deficient.  If he is still iron deficient in spite of oral iron supplementation, we can consider Feraheme infusion again in the next few days.    4.  Currently in the TCU he is taking iron tablets 2 times a day and a multivitamin tablet once a day.  I would recommend continuing with the same since he appears to be tolerating it well.    5.  He does need follow-up with his PCP.  He also needs follow-up with cardiology/cardiac surgery.  I would also like him to see gastroenterology as we referred him earlier.    6.  Return to clinic with MD or NP in 6 weeks.  The following labs are ordered: CBC and platelets, ferritin and TIBC panel.  I would like to have this drawn a day or 2 before if possible.  Provisionally he will be scheduled for Feraheme infusion when he comes back in 6 weeks.    Time spend >25 minutes face to face with the patient. More than 50 % in counseling and coordination of care.              Problem List    1. Iron deficiency anemia due to chronic blood loss  Ferritin    Iron and Transferrin Iron Binding Capacity    HM2 (CBC W/O DIFF)    Ferritin    Iron and Transferrin Iron Binding Capacity   2. Gastrointestinal hemorrhage, unspecified gastrointestinal hemorrhage type     3. CKD (chronic kidney disease) stage 3, GFR 30-59 ml/min (H)          CC: Inocente Colby MD Tiffany Radke, NP    ______________________________________________________________________________    History of Present Illness    Mr. Lopez Mcdermott is here for follow-up alone.  Apparently he was brought by his son from the TCU for the follow-up.  The son was not with him when I met with him.    The patient appears more forgetful.  He is really not able to give much history.  He is oriented well and recognizes me.  However he struggles to tell me  where he is living now.  With prompting he is able to tell me that he lives in [facility].  He says that he is making progress with rehabilitation.  He is unsure how long he is going to be there.    He says that he is undergoing physical therapy at outpatient therapy about 3 times per day.  He says that he is able to walk about 50-60 feet with the aid of a walker.  He has not yet tried the stairs.    He says that some days the left hip hurts and sometimes it does not.  Overall he feels that he is healing okay.    He has not noticed any dizziness or chest pain or palpitation.  He says that he has not felt short of breath when he is walking around.    He says that he is eating well.  No nausea or vomiting.  No abdominal pain.  He says that he has no constipation or diarrhea.  He says that his stools have been brown.  He has not noticed any blood in stool or black stools.    He says that the main problem that he is dealing with now is his urination.  He was self catheterizing for 8 or 9 years in the past.  When he was in the hospital after the fall he had a Jimenez catheter in.  He says that may stop everything.  He is back to self-catheterization again.  He says that he is slowly coming back to his baseline rhythm.    No fevers.  No lumps or bumps anywhere.  No unusual headaches.  No eyesight problems.  No mouth sores.  No swallowing difficulty.  No bleeding from any site.  No numbness or tingling.    Please see below.  A 14 point review of system is otherwise completely negative.    Pain Status   : On and off pain at the left hip where he had the surgery.    Current Outpatient Medications   Medication Sig Dispense Refill     doxazosin (CARDURA) 4 MG tablet Take 1 tablet (4 mg total) by mouth daily. 90 tablet 3     levETIRAcetam (KEPPRA) 500 MG tablet Take 1 tablet (500 mg total) by mouth 2 (two) times a day. 60 tablet 1     metoprolol tartrate (LOPRESSOR) 25 MG tablet Take 1 tablet (25 mg total) by mouth  "daily. 90 tablet 3     multivitamin therapeutic tablet Take 1 tablet by mouth daily.       pantoprazole (PROTONIX) 40 MG tablet Take 1 tablet (40 mg total) by mouth daily. 90 tablet 3     senna-docusate (PERICOLACE) 8.6-50 mg tablet Take 1 tablet by mouth 2 (two) times a day.  0     tamsulosin (FLOMAX) 0.4 mg cap Take 0.4 mg by mouth.       acetaminophen (TYLENOL) 500 MG tablet Take 2 tablets (1,000 mg total) by mouth 3 (three) times a day.  0     ferrous sulfate 325 (65 FE) MG tablet Take 1 tablet by mouth 2 (two) times a day.       fluticasone (FLONASE) 50 mcg/actuation nasal spray 1 spray into each nostril daily. 16 g 5     oxyCODONE (ROXICODONE) 5 MG immediate release tablet Take 0.5-1 tablets (2.5-5 mg total) by mouth every 4 (four) hours as needed. 20 tablet 0     No current facility-administered medications for this visit.          Review of Systems    Constitutional     Neurosensory     Cardiovascular     Pulmonary     Gastrointestinal     Genitourinary     Integumentary     Patient Coping     Distress Assessment     Accompanied by       Past History  No past medical history on file.      Past Surgical History:   Procedure Laterality Date     CERVICAL FUSION  1971, 1977    x2     COLONOSCOPY  05/31/2017     COLONOSCOPY  04/18/2018    Multiple diverticula. \"Evidence of recent bleeding from diverticular opening.\"      COLONOSCOPY  05/02/2018    Black material was found in the entire colon.       COLONOSCOPY W/ POLYPECTOMY  11/30/2004     ESOPHAGOGASTRODUODENOSCOPY  04/13/1998    Healed antral ulcers.      ESOPHAGOGASTRODUODENOSCOPY  02/13/1998    Hiatal hernia with reflux esophagitis and distal esophageal erosions, multiple antral ulcers.     ESOPHAGOGASTRODUODENOSCOPY  09/24/2002    Hiatal hernia with distal esophageal stricture and probable Macdonald's status post antral and esophageal biopsy status post balloon dilation to 18 mm.     ESOPHAGOGASTRODUODENOSCOPY  05/31/2017     ESOPHAGOGASTRODUODENOSCOPY  " 10/24/2017     ESOPHAGOGASTRODUODENOSCOPY  04/17/2018     ESOPHAGOGASTRODUODENOSCOPY  05/02/2018     HIP PINNING Left 1/27/2019    Procedure: INTERNAL FIXATION, FRACTURE, TROCHANTERIC, HIP, USING PINS OR RODS, USING HANA TABLE;  Surgeon: Fran Chi MD;  Location: [facility];  Service: Orthopedics     INGUINAL HERNIA REPAIR Bilateral 08/08/2001    Right indirect and direct inguinal hernia, left direct inguinal hernia.      TRANSURETHRAL RESECTION OF PROSTATE  11/17/2006    Cystoscopy, KTP laser TURP.     TRANSURETHRAL RESECTION OF PROSTATE  10/21/2009    CYSTOSCOPY, TUR OF PROSTATE WITH KTP LASER - PROCEDURE: CYSTOSCOPY AND TRANSURETHRAL RESECTION OF PROSTATE WITH KTP LASER       Physical Exam    Recent Vitals 2/19/2019   Weight -   BSA (m2) -   /52   Pulse 77   Temp 98.4   Temp src 1   SpO2 97   Some recent data might be hidden       GENERAL: Alert and oriented. Seated comfortably in a wheelchair. In no distress.  He looks weaker than when I last saw him.  He also appears more forgetful.    HEAD: Atraumatic and normocephalic.  Has a full head of hair.    EYES: JUANITO, EOMI.  No pallor.  No icterus.    Oral cavity: no mucosal lesion or tonsillar enlargement.    NECK: supple. JVP normal.  No thyroid enlargement.    LYMPH NODES: No palpable, cervical, axillary or inguinal lymphadenopathy.    CHEST: clear to auscultation bilaterally.  Symmetrical breath movements bilaterally.    CVS: S1 and S2 are heard. Regular rate and rhythm.  He has a systolic murmur at the apex.    ABDOMEN: Soft. Not tender. Not distended.  No palpable hepatomegaly or splenomegaly.  No other mass palpable.  Bowel sounds heard.    EXTREMITIES: Warm.  Trace pedal edema on the right side.  1+ pedal edema on the left side.      SKIN: no rash, or bruising or purpura.        Lab Results    Recent Results (from the past 24 hour(s))   HM2 (CBC W/O DIFF)    Collection Time: 02/19/19  2:41 PM   Result Value Ref Range    WBC 9.1 4.0 -  11.0 thou/uL    RBC 3.48 (L) 4.40 - 6.20 mill/uL    Hemoglobin 9.6 (L) 14.0 - 18.0 g/dL    Hematocrit 31.9 (L) 40.0 - 54.0 %    MCV 92 80 - 100 fL    MCH 27.6 27.0 - 34.0 pg    MCHC 30.1 (L) 32.0 - 36.0 g/dL    RDW 19.6 (H) 11.0 - 14.5 %    Platelets 256 140 - 440 thou/uL    MPV 9.6 8.5 - 12.5 fL     Imaging    Ct Head Without Contrast    Result Date: 1/28/2019  [facility] RADIOLOGY EXAM: CT HEAD WO CONTRAST LOCATION: [facility] DATE/TIME: 1/28/2019 2:37 PM INDICATION: Follow-up subdural hematoma. COMPARISON: 01/27/2019. 01/26/2019. TECHNIQUE: Routine without IV contrast. Multiplanar reformats. Dose reduction techniques were used. FINDINGS: INTRACRANIAL CONTENTS: Again seen is the hypodense broad subdural fluid collection over the left cerebral convexity. Given its hypodense appearance it may represent subdural hygroma versus very chronic subdural hematoma. Allowing for slight differences in scan angulation and slice placement, there has been a very slight further increase in the radial thickness of this hypodense left cerebral convexity subdural collection since yesterday now measuring up to 8-9 mm in radial thickness versus 7-8 mm in radial thickness on yesterday's exam. No new hyperdense component to this fluid collection. Mass effect is minimal on the left cerebral hemisphere. Tiny linear density along the superior margin of the anterior medial left lateral ventricle again seen and  slightly smaller. There is a punctate focus of hemorrhage in the occipital horn of the left lateral ventricle which actually was present yesterday and is slightly smaller today. No new acute hyperdense hemorrhage. No CT evidence of acute infarct. Mild presumed chronic small vessel ischemic changes. Generalized cerebral and cerebellar atrophy. The ventricles and sulci are normal for age. VISUALIZED ORBITS/SINUSES/MASTOIDS: No significant orbital abnormality. No significant paranasal sinus mucosal disease. No significant  middle ear or mastoid effusion. OSSEOUS STRUCTURES/SOFT TISSUES: No significant abnormality.     CONCLUSION: 1.  Again seen is the broad hypodense subdural fluid collection over the left lateral frontal and parietal areas. Allowing for slight differences in scan technique, it appears very slightly larger in radial thickness by 1 mm or so compared to yesterday study now measuring between 8 and 9 mm versus 7-8 mm yesterday. Mass effect is minimal and not changed significantly. Given its hypodense appearance it most likely represents a subdural hygroma. Chronic subdural hematoma could be considered although this  may not fit the clinical picture. 2.  Tiny punctate hyperdense focus along the superior anterior left lateral ventricle unchanged to slightly smaller compatible with a small hemorrhage. Additionally there is a punctate focus of acute hemorrhage layering in the occipital horn of the left lateral ventricle which in retrospect was present yesterday and is slightly smaller today. No new acute intracranial hemorrhage. 3.  No new infarct, hydrocephalus or significant mass effect.    Ct Head Without Contrast    Result Date: 1/27/2019  [facility] RADIOLOGY EXAM: CT HEAD WO CONTRAST LOCATION: [facility] DATE/TIME: 1/27/2019 1:25 PM INDICATION: Sdh COMPARISON: Head CT on 01/26/2018. TECHNIQUE: Routine without IV contrast. Multiplanar reformats. Dose reduction techniques were used. FINDINGS: Interval increase in size of subdural hematoma along the left cerebral convexity measuring 7 to 8 mm in maximum thickness, previously 5 mm. Small linear hyperdensity measuring 3 mm overlying the body of the corpus callosum which likely represents a small  focus of subarachnoid hemorrhage on image #27, series 2. No other evidence of acute intracranial hemorrhage or mass effect. Scattered foci of decreased attenuation within the cerebral hemispheric white matter which are nonspecific, though most commonly ascribed to chronic  small vessel ischemic disease. Postsurgical changes bilateral cataract surgery. The partially imaged globes are otherwise unremarkable. The partially imaged paranasal sinuses, mastoid air cells and middle ear cavities are unremarkable. The visualized skull base and calvarium are unremarkable.     CONCLUSION:  1.  Interval increase in size of subdural hematoma along the left cerebral convexity measuring 7 to 8 mm in maximum thickness, previously 5 mm. 2.  Small linear hyperdensity injury 3 mm overlying the body of the corpus callosum, which likely represents a small focus of subarachnoid hemorrhage. Attention this region on follow-up is suggested. 3.  No other evidence of acute intracranial hemorrhage or mass effect. Dr. Queen discussed the results with Dr. Garcia on 1/27/2019 1:44 PM by telephone.    Ct Head Without Contrast    Result Date: 1/26/2019  [facility] RADIOLOGY EXAM: CT HEAD WO CONTRAST LOCATION: [facility] DATE/TIME: 1/26/2019 6:59 PM INDICATION: Fall, head injury fall, head injury COMPARISON: None. TECHNIQUE: Routine without IV contrast. Multiplanar reformats. Dose reduction techniques were used. FINDINGS: Predominantly low density extra-axial collection overlying the limits cerebral convexity measuring 5 mm with a subdural hematoma. No other evidence of acute intracranial hemorrhage or mass effect. Scattered foci of decreased attenuation within the cerebral hemispheric white matter which are nonspecific, though most commonly ascribed to chronic small vessel ischemic disease. The ventricles and sulci are prominent consistent with mild brain parenchymal volume loss. Gray-white matter differentiation is maintained. The basilar cisterns are patent. Postsurgical changes bilateral cataract surgery. Mild mucosal thickening within the maxillary sinuses bilaterally. The partially imaged paranasal sinuses are otherwise unremarkable. The mastoid air cells and middle ear cavities are unremarkable. The  visualized skull base and calvarium are unremarkable.     CONCLUSION:  1.  Predominantly low density density extra-axial collection overlying the limits cerebral convexity measuring 5 mm with a subdural hematoma. 2.  No other evidence of acute intracranial hemorrhage or mass effect. 3.  Mild nonspecific white matter changes. 4.  Mild brain parenchymal volume loss. Dr. Queen discussed the results with Dr. Leblanc on 1/26/2019 7:21 PM by telephone.    Ct Cervical Spine Without Contrast    Result Date: 1/26/2019  [facility] RADIOLOGY EXAM: CT CERVICAL SPINE WO CONTRAST LOCATION: [facility] DATE/TIME: 1/26/2019 6:59 PM INDICATION: Fall, neck injury fall, neck injury COMPARISON: None. TECHNIQUE: Routine without contrast. Multiplanar reformats. Dose reduction techniques were utilized. FINDINGS: No evidence of acute fracture or subluxation of the cervical spine by CT imaging.  The odontoid process is well approximated with the anterior body of C1 and well aligned between the lateral masses of C1. Anterolisthesis of C7 on T1 measuring 3 mm. Osseous fusion of the C4-C7 vertebral bodies across the disc spaces. Straightening of the normal cervical lordosis. The vertebral bodies of the cervical spine otherwise have normal stature and alignment. Degenerative disc disease with disc height loss at  C7/T1. The partially imaged intracranial contents are unremarkable. No prevertebral soft tissue swelling. Calcified plaque within the internal carotid arteries bilaterally. C2/C3:  Broad bar disc osteophyte complex with moderate spinal canal narrowing. Uncovertebral joint disease and facet arthropathy severe bilateral neural foraminal narrowing. C3/C4:  Broad bar disc osteophyte complex with moderate spinal canal narrowing. Uncovertebral joint disease and facet arthropathy with severe right and moderate to severe left neural foraminal narrowing. C4/C5:  No posterior disc bulge or spinal canal narrowing. Uncovertebral joint  disease and facet arthropathy with mild bilateral neural foraminal narrowing. C5/C6:  No posterior disc bulge or spinal canal narrowing. Uncovertebral joint disease and facet arthropathy with moderate bilateral neural foraminal narrowing.  C6/C7:  No posterior disc bulge or spinal canal narrowing. Uncovertebral joint disease and facet arthropathy with severe bilateral neural foraminal narrowing. C7/T1: No posterior disc bulge or spinal canal narrowing. Uncovertebral joint disease and facet arthropathy with severe bilateral neural foraminal narrowing.     CONCLUSION:  1.  No evidence of acute fracture or subluxation of the cervical spine by CT imaging. 2.  Osseous fusion of the C4-C7 vertebral bodies across the disc spaces. 3.  Degenerative cervical spondylosis as described above.    Xr C Arm Less Than One Hour    Result Date: 1/27/2019  Please see Operative or Procedure Note for details on this exam or Radiology Report for body part of interest (if applicable).     Xr Chest 1 View    Result Date: 1/26/2019  XR CHEST 1 VIEW 1/26/2019 6:11 PM INDICATION: Altered mental status COMPARISON: 07/23/2018 FINDINGS: Cardiomegaly. Pulmonary vascularity normal. Elevated right hemidiaphragm. The lungs are clear. No acute pulmonary disease.    Xr Pelvis W 2 Vw Hip Left    Result Date: 1/26/2019  [facility] RADIOLOGY EXAM: XR PELVIS W 2 VW HIP LEFT LOCATION: [facility] DATE/TIME: 1/26/2019 7:12 PM INDICATION: Left hip pain, fall COMPARISON: None. FINDINGS: Comminuted intertrochanteric fracture left hip and moderate varus deformity and long horizontal fracture line extending into the upper femoral shaft. No other fractures. No dislocations.        Signed by: Vania Nur MD

## 2021-06-24 NOTE — PROGRESS NOTES
[facility]    DATE: 3/5/2019    NAME:  Lopez Mcdermott             :  1929  MRN: [id number]  CODE STATUS:  FULL CODE    VISIT TYPE: Review Of Multiple Medical Conditions     FACILITY:  [facility] [[id number]]       CHIEF COMPLAIN/REASON FOR VISIT:    Chief Complaint   Patient presents with     Review Of Multiple Medical Conditions               HISTORY OF PRESENT ILLNESS: Lopez Mcdermott is a 89 y.o. male who was admitted - for fall and found to have subdural hematoma on CT and Left comminuted intertrochanteric hip fracture. Neurosurgery was consulted and recommended conservative management. He was monitored in ICU and moved to general floor. His blood pressure was optimized <150. Neurology was consulted for possible seizure activity and recommended keppra. He underwent ORIF of Left hip fracture on . He was noted to have mod to severe AS and cardiology was consulted and recommended outpatient eval for TAVR procedure. He was also treated for UTI and completed 7 days of keflex. He was transferred to [facility] for further rehab. He has PMH of moderate to severe AS, BPH, anemia, CKD stage 3, A fib, gupta esophagus. Prior to this he lived at home with his son and daughter in law.     Today Mr. Mcdermott is seen for review of systems. He reports he is doing pretty well except that he was waiting for someone to help him get ready this morning and no one came. He denies pushing his call button. He is a little frustrated and distracted during exam. He says he does not have any pain right now in his hip and very minimal when walking. He does not think he has needed the tylenol recently. He says his bowels are still moving almost every day. He denies any dizziness or other concerns recently. Reviewed neurosurgery notes from 3/1 and stopped keppra.     REVIEW OF SYSTEMS:  PROBLEMS AND REVIEW OF SYSTEMS:   Review of Systems  Today on ROS:   Currently,  no fever, chills, or rigors. Decreased vision and hearing. Denies any chest pain, palpitations, lightheadedness, dizziness. Appetite is good. Denies any GERD symptoms. Denies any difficulty with swallowing, nausea, or vomiting.  Denies any abdominal pain, diarrhea or constipation. No active bleeding. No rash. Positive for left hip incisions healed, left hip pain minimal, no visual changes, headaches, no recent shortness of breath, no further swelling in left leg, numbness left leg, urinary retention-straight cath 4 times daily      No Known Allergies  Current Outpatient Medications   Medication Sig     acetaminophen (TYLENOL) 500 MG tablet Take 2 tablets (1,000 mg total) by mouth 3 (three) times a day. (Patient taking differently: Take 1,000 mg by mouth every 6 (six) hours as needed.       )     doxazosin (CARDURA) 4 MG tablet Take 1 tablet (4 mg total) by mouth daily.     ferrous sulfate 325 (65 FE) MG tablet Take 1 tablet by mouth 2 (two) times a day.     fluticasone (FLONASE) 50 mcg/actuation nasal spray 1 spray into each nostril daily.     levETIRAcetam (KEPPRA) 500 MG tablet Take 1 tablet (500 mg total) by mouth 2 (two) times a day.     metoprolol tartrate (LOPRESSOR) 25 MG tablet Take 1 tablet (25 mg total) by mouth daily.     multivitamin therapeutic tablet Take 1 tablet by mouth daily.     pantoprazole (PROTONIX) 40 MG tablet Take 1 tablet (40 mg total) by mouth daily.     senna-docusate (PERICOLACE) 8.6-50 mg tablet Take 1 tablet by mouth 2 (two) times a day.     tamsulosin (FLOMAX) 0.4 mg cap Take 0.4 mg by mouth.     Past Medical History:    No past medical history on file.        PHYSICAL EXAMINATION  Vitals:    03/04/19 2038   BP: 110/56   Pulse: (!) 52   Resp: 18   Temp: 97  F (36.1  C)   SpO2: 97%   Weight: 169 lb (76.7 kg)       Today on physical exam:     GENERAL: Awake, Alert, oriented x3, not in any form of acute distress, answers questions appropriately, follows simple commands,  conversant  HEENT: Head is normocephalic with normal hair distribution. No evidence of trauma. Ears: No acute purulent discharge. Eyes: Conjunctivae pink with no scleral jaundice. Nose: Normal mucosa and septum. NECK: Supple with no cervical or supraclavicular lymphadenopathy. Trachea is midline. glasses  CHEST: No tenderness or deformity, no crepitus  LUNG: Clear to auscultation with good chest expansion. There are no crackles or wheezes, normal AP diameter.  BACK: No kyphosis of the thoracic spine. Symmetric, no curvature, ROM normal, no CVA tenderness, no spinal tenderness   CVS: irregularly irregular rhythm, systolic murmur, no rubs, gallops, or heaves,  2+ pulses symmetric in all extremities.  ABDOMEN: Rounded and soft, nontender to palpation, non distended, no masses, no organomegaly, good bowel sounds, no rebound or guarding, no peritoneal signs.   EXTREMITIES: no LLE pedal edema, Left hip two incisions healed, minimal Left hip pain and tenderness with ROM, no rle edema  SKIN: Warm and dry,   NEUROLOGICAL: The patient is oriented to person, place and time but is forgetful at times. No movement disorder, no arm drift, visual changes, no facial droop, unilateral weakness, mild numbness and tingling in left leg.             LABS:   No results found for this or any previous visit (from the past 168 hour(s)).  Results for orders placed or performed in visit on 02/05/19   Basic Metabolic Panel   Result Value Ref Range    Sodium 138 136 - 145 mmol/L    Potassium 3.8 3.5 - 5.0 mmol/L    Chloride 104 98 - 107 mmol/L    CO2 26 22 - 31 mmol/L    Anion Gap, Calculation 8 5 - 18 mmol/L    Glucose 87 70 - 125 mg/dL    Calcium 8.7 8.5 - 10.5 mg/dL    BUN 20 8 - 28 mg/dL    Creatinine 0.97 0.70 - 1.30 mg/dL    GFR MDRD Af Amer >60 >60 mL/min/1.73m2    GFR MDRD Non Af Amer >60 >60 mL/min/1.73m2         Lab Results   Component Value Date    WBC 9.1 02/19/2019    HGB 9.6 (L) 02/19/2019    HCT 31.9 (L) 02/19/2019    MCV 92  02/19/2019     02/19/2019       Lab Results   Component Value Date    [id number] 637 01/28/2019     No results found for: HGBA1C  Lab Results   Component Value Date    INR 1.18 (H) 01/26/2019    INR 1.27 (H) 07/23/2018    INR 1.21 (H) 05/16/2018     No results found for: [id number]  Lab Results   Component Value Date    TSH 3.19 01/28/2019           ASSESSMENT/PLAN:    1. Subdural hematoma, fall: No headaches, visual changes, no neuro deficits noted. Neuro checks daily. F/u with Neurosurgery in 2 weeks with head CT, scheduled for 2/11-apparently missed CT appt, now rescheduled for 3/1-stopped keppra, increase activity as tolerated, avoid falls, f/u prn.  No recent neuro concerns. CT showed interval resolution of left frontoparietal low attenuation subdural hematoma, interval resolution of small amt of intraventricular hemorrhage left occipital horn, mod generalized volume loss and chronic ischemic changes.   2. Left hip fracture, s/p ORIF: Incisions c/d/i, 1+ edema LLE. F/u with ortho in 2 weeks-no appt scheduled, needs f/u appt, reminded staff again today as no appt scheduled. tevin hose. Ice prn. PT, OT.  Pain controlled tylenol prn    3. Moderate-severe AS: Was being evaluated for LAAO and TAVR procedure prior to hospital stay. Will need to f/u with cardiology once more stable.   4. BPH, straight cath: On doxazosin and added flomax  straight cath four times a day as doing at home and f/u with urology after discharge. No recent concerns, staff assisting patient in doing.   5. A fibrillation: Rate controlled in 50s. On metoprolol. hold parameters. No anticoagulants, antiplatelets until cleared per neurosurgery-reviewed 3/1 note and Ct showed interval resolution of hemorrhage. Will continue off antiplatelets, anticoagulants due to anemia and recent SDH for now. F/u with cardiology regarding.   6. Macdonald's esophagus, h/o GI bleed: On pantoprazole. No current issues.   7. Iron deficiency anemia, acute blood  loss anemia: Follows with hem/onc for weekly/biweekly hg levels, blood transfusions, iron infusions. On folic acid. Hg 8 on 1/29. 8.7 on 2/5. HG 8.5 on 2/11, stable. Appt with hematology and infusion clinic on 2/19-iron and transferrin, ferritin stable no need for iv infusion of iron. Hg improved to 9.6. Recommended iron two times a day so this was restarted. F/u again on 4/2. Recommended gi follow up.   8. CKD stage 3: Cr level 1.03 on 1/30. Cr 0.97 on 2/5. Stable.   9. UTI: completed keflex.   10. Allergic rhinitis: On flonase.   11. Constipation: On senna docusate two times a day. BM nearly every day.       Per therapy soft LCD 3/11 sba for toileting and dressing, ambulates 45-50 feet with walker and cga, 11 on slums, 4.2 on cpt.  PT, OT, HHA. Recommending 24 hour supervision.     Electronically signed by: Homa Smyth NP    Time spent in interview and examination of patient, review of available records, and discussion with nursing staff. Continue care plan, efforts at therapy, and monitor nutritional status.

## 2021-06-24 NOTE — PROGRESS NOTES
[facility]    DATE: 3/14/2019    NAME:  Lopez Mcdermott             :  1929  MRN: [id number]  CODE STATUS:  FULL CODE    VISIT TYPE: Review Of Multiple Medical Conditions     FACILITY:  [facility] [[id number]]       CHIEF COMPLAIN/REASON FOR VISIT:    Chief Complaint   Patient presents with     Review Of Multiple Medical Conditions               HISTORY OF PRESENT ILLNESS: Lopez Mcdermott is a 89 y.o. male who was admitted - for fall and found to have subdural hematoma on CT and Left comminuted intertrochanteric hip fracture. Neurosurgery was consulted and recommended conservative management. He was monitored in ICU and moved to general floor. His blood pressure was optimized <150. Neurology was consulted for possible seizure activity and recommended keppra. He underwent ORIF of Left hip fracture on . He was noted to have mod to severe AS and cardiology was consulted and recommended outpatient eval for TAVR procedure. He was also treated for UTI and completed 7 days of keflex. He was transferred to [facility] for further rehab. He has PMH of moderate to severe AS, BPH, anemia, CKD stage 3, A fib, gupta esophagus. Prior to this he lived at home with his son and daughter in law.     Today Mr. Mcdermott is seen for follow up and review of systems today. He reports that he needs to get up to the bathroom. He is seen sitting on edge of bed with depends ripped off and and trying to get up on own. His alarms are going off. He says he needs to go to the bathroom and doesn't need any help. He was assisted back to bed until staff were able to come and assist him. He says he is having some pain in his hip at times but otherwise he is doing fine. He denies any shortness of breath or concerns. He denies any dizziness, lightheadedness, headaches, nausea, stomach upset, or issues with bowels recently. Per staff still waiting long term care placement.      REVIEW OF SYSTEMS:  PROBLEMS AND REVIEW OF SYSTEMS:   Review of Systems  Today on ROS:   Currently, no fever, chills, or rigors. Decreased vision and hearing. Denies any chest pain, palpitations, lightheadedness, dizziness. Appetite is good. Denies any GERD symptoms. Denies any difficulty with swallowing, nausea, or vomiting.  Denies any abdominal pain, diarrhea or constipation. No active bleeding. No rash. Positive for left hip incisions healed, left hip pain minimal, no visual changes, headaches, no recent shortness of breath, no further swelling in left leg, no numbness left leg today, urinary retention-straight cath 4 times daily      No Known Allergies  Current Outpatient Medications   Medication Sig     acetaminophen (TYLENOL) 500 MG tablet Take 2 tablets (1,000 mg total) by mouth 3 (three) times a day. (Patient taking differently: Take 1,000 mg by mouth every 6 (six) hours as needed.       )     doxazosin (CARDURA) 4 MG tablet Take 1 tablet (4 mg total) by mouth daily.     ferrous sulfate 325 (65 FE) MG tablet Take 1 tablet by mouth 2 (two) times a day.     fluticasone (FLONASE) 50 mcg/actuation nasal spray 1 spray into each nostril daily.     levETIRAcetam (KEPPRA) 500 MG tablet Take 1 tablet (500 mg total) by mouth 2 (two) times a day.     metoprolol tartrate (LOPRESSOR) 25 MG tablet Take 1 tablet (25 mg total) by mouth daily.     multivitamin therapeutic tablet Take 1 tablet by mouth daily.     pantoprazole (PROTONIX) 40 MG tablet Take 1 tablet (40 mg total) by mouth daily.     senna-docusate (PERICOLACE) 8.6-50 mg tablet Take 1 tablet by mouth 2 (two) times a day.     tamsulosin (FLOMAX) 0.4 mg cap Take 0.4 mg by mouth.     Past Medical History:    No past medical history on file.        PHYSICAL EXAMINATION  Vitals:    03/13/19 2205   BP: 133/57   Pulse: 73   Resp: 18   Temp: (!) 96  F (35.6  C)   SpO2: 96%   Weight: 168 lb (76.2 kg)       Today on physical exam:     GENERAL: Awake, Alert, oriented  x3, not in any form of acute distress, answers questions appropriately, follows simple commands, conversant  HEENT: Head is normocephalic with normal hair distribution. No evidence of trauma. Ears: No acute purulent discharge. Eyes: Conjunctivae pink with no scleral jaundice. Nose: Normal mucosa and septum. NECK: Supple with no cervical or supraclavicular lymphadenopathy. Trachea is midline. glasses  CHEST: No tenderness or deformity, no crepitus  LUNG: Clear to auscultation with good chest expansion. There are no crackles or wheezes, normal AP diameter.  BACK: No kyphosis of the thoracic spine. Symmetric, no curvature, ROM normal, no CVA tenderness, no spinal tenderness   CVS: irregularly irregular rhythm, systolic murmur, no rubs, gallops, or heaves,  2+ pulses symmetric in all extremities.  ABDOMEN: Rounded and soft, nontender to palpation, non distended, no masses, no organomegaly, good bowel sounds, no rebound or guarding, no peritoneal signs.   EXTREMITIES: no LLE pedal edema, Left hip two incisions healed, minimal Left hip pain and tenderness with ROM, no rle edema  SKIN: Warm and dry,   NEUROLOGICAL: The patient is oriented to person, place and time but is forgetful at times. No movement disorder, no arm drift, visual changes, no facial droop, unilateral weakness, no numbness and tingling in left leg today            LABS:   No results found for this or any previous visit (from the past 168 hour(s)).  Results for orders placed or performed in visit on 02/05/19   Basic Metabolic Panel   Result Value Ref Range    Sodium 138 136 - 145 mmol/L    Potassium 3.8 3.5 - 5.0 mmol/L    Chloride 104 98 - 107 mmol/L    CO2 26 22 - 31 mmol/L    Anion Gap, Calculation 8 5 - 18 mmol/L    Glucose 87 70 - 125 mg/dL    Calcium 8.7 8.5 - 10.5 mg/dL    BUN 20 8 - 28 mg/dL    Creatinine 0.97 0.70 - 1.30 mg/dL    GFR MDRD Af Amer >60 >60 mL/min/1.73m2    GFR MDRD Non Af Amer >60 >60 mL/min/1.73m2         Lab Results   Component  Value Date    WBC 9.1 02/19/2019    HGB 9.6 (L) 02/19/2019    HCT 31.9 (L) 02/19/2019    MCV 92 02/19/2019     02/19/2019       Lab Results   Component Value Date    [id number] 637 01/28/2019     No results found for: HGBA1C  Lab Results   Component Value Date    INR 1.18 (H) 01/26/2019    INR 1.27 (H) 07/23/2018    INR 1.21 (H) 05/16/2018     No results found for: [id number]  Lab Results   Component Value Date    TSH 3.19 01/28/2019           ASSESSMENT/PLAN:    1. Subdural hematoma, fall: No headaches, visual changes, no neuro deficits noted. Neuro checks daily. F/u with Neurosurgery 3/1-stopped keppra, increase activity as tolerated, avoid falls, f/u prn.  No recent neuro concerns. CT showed interval resolution of left frontoparietal low attenuation subdural hematoma, interval resolution of small amt of intraventricular hemorrhage left occipital horn, mod generalized volume loss and chronic ischemic changes.   2. Left hip fracture, s/p ORIF: Incisions c/d/i, 1+ edema LLE. F/u with ortho on 3/13-no notes or orders available, requested staff request copies from ortho office. tevin bashir. Ice prn. PT, OT.  Pain controlled tylenol prn    3. Moderate-severe AS: Was being evaluated for LAAO and TAVR procedure prior to hospital stay. Will need to f/u with cardiology once more stable.   4. BPH, straight cath: On doxazosin and added flomax  straight cath four times a day as doing at home and f/u with urology after discharge. No recent concerns, staff assisting patient in doing.   5. A fibrillation: Rate controlled in 70s. On metoprolol. hold parameters. No anticoagulants, antiplatelets until cleared per neurosurgery-reviewed 3/1 note and Ct showed interval resolution of hemorrhage. continue off antiplatelets, anticoagulants due to anemia and recent SDH for now. F/u with cardiology.   6. Macdonald's esophagus, h/o GI bleed: On pantoprazole. No current issues.   7. Iron deficiency anemia, acute blood loss anemia:  Follows with hem/onc for weekly/biweekly hg levels, blood transfusions, iron infusions. On folic acid. Hg 8 on 1/29. 8.7 on 2/5. HG 8.5 on 2/11, stable. Appt with hematology and infusion clinic on 2/19-iron and transferrin, ferritin stable no need for iv infusion of iron. Hg improved to 9.6. Recommended iron two times a day so this was restarted. F/u again on 4/2. Recommended gi follow up.   8. CKD stage 3: Cr level 1.03 on 1/30. Cr 0.97 on 2/5. Stable.   9. UTI: completed keflex.   10. Allergic rhinitis: On flonase.   11. Constipation: On senna docusate two times a day. BM nearly every day.   12. Cognitive impairment: Only scored 11/30 on slums and 4.2 on cpt. Speech therapy was working on cognition and now completed. Therapy recommending 24 hour supervision for safety. A/o but forgetful.      LCD 3/11 sba for toileting and dressing, ambulates 75 feet with walker and cga, 11 on slums, 4.2 on cpt. Private pay, family still looking into long term care placement.     Electronically signed by: Homa Smyth NP

## 2021-06-24 NOTE — PROGRESS NOTES
NEUROSURGERY FOLLOW UP EVALUATION:    ASSESSMENT/ PLAN:  Lopez Mcdermott is a 89 y.o. male, presented to [facility] emergency after a fall. He was found down by his daughter. He hit his head and fractured his left hip. There was a SDH found on CT in ED.  He is not on anticoagulation.  His repeat CT showed an increase in subdural hematoma and new subarachnoid blood over the corpus collosum.    PLAN:    1.  May increase activity as tolerated and following ortho recommendations for hip.    2.  Follow up as needed.   3.  Avoid falls.   4.  Stop Keppra    Today he reports no headache. Still in NH after hip repair.     EXAM:    Alert and oriented x3, speech clear  Arm strength: 5/5  Leg strength: 5/5   Bulk and tone: normal  Gait seen in wheel chair due to hip       IMAGING:  The imaging was personally reviewed by me.   1.  Interval resolution of left frontoparietal low attenuation subdural hematoma.  2.  Interval resolution of small amount of intraventricular hemorrhage within the left occipital horn.  3.  No acute abnormality.  4.  Moderate generalized volume loss and chronic ischemic changes    Kim Garcia NP  [facility]surgery

## 2021-06-24 NOTE — PROGRESS NOTES
[facility]    DATE: 2019    NAME:  Lopez Mcdermott             :  1929  MRN: [id number]  CODE STATUS:  FULL CODE    VISIT TYPE: Review Of Multiple Medical Conditions     FACILITY:  [facility] [[id number]]       CHIEF COMPLAIN/REASON FOR VISIT:    Chief Complaint   Patient presents with     Review Of Multiple Medical Conditions               HISTORY OF PRESENT ILLNESS: Lopez Mcdermott is a 89 y.o. male who was admitted - for fall and found to have subdural hematoma on CT and Left comminuted intertrochanteric hip fracture. Neurosurgery was consulted and recommended conservative management. He was monitored in ICU and moved to general floor. His blood pressure was optimized <150. Neurology was consulted for possible seizure activity and recommended keppra. He underwent ORIF of Left hip fracture on . He was noted to have mod to severe AS and cardiology was consulted and recommended outpatient eval for TAVR procedure. He was also treated for UTI and completed 7 days of keflex. He was transferred to [facility] for further rehab. He has PMH of moderate to severe AS, BPH, anemia, CKD stage 3, A fib, gupta esophagus. Prior to this he lived at home with his son and daughter in law.     Today Mr. Mcdermott is seen for review of systems today. He is noted to be standing at the door to the bathroom without an assistive device alone. His legs were shaking and was noted to be lowering self to the floor. He reports he had to go to the bathroom but his legs were not working. He says he cannot remember if he pushed the call button to ask for help. He says he needs some help. Assisted him to standing position and obtained walker and staff to assist. Was able to lower into wheelchair and did not fall. Discussed with him importance of needing assistance when going to bathroom or getting up. Discussed needing evaluation for alarms with staff. He says he is  not having any shortness of breath at this time or recently that he recalls. He does have pain in his hip this morning when standing. He is not sure if he has had an appt with ortho recently or has one coming up. He denies issues with bowels recently. Per staff he has not been noted to be getting out of bed on his previously that they are aware of.He has appt with nsgy on 3/1.     REVIEW OF SYSTEMS:  PROBLEMS AND REVIEW OF SYSTEMS:   Review of Systems  Today on ROS:   Currently, no fever, chills, or rigors. Decreased vision and hearing. Denies any chest pain, palpitations, lightheadedness, dizziness. Appetite is good. Denies any GERD symptoms. Denies any difficulty with swallowing, nausea, or vomiting.  Denies any abdominal pain, diarrhea or constipation. No active bleeding. No rash. Positive for left hip incisions, left hip pain, no visual changes, headaches, shortness of breath with exertion, swelling in left leg, numbness left leg, urinary retention-straight cath 4 times daily, up on own this morning near fall      No Known Allergies  Current Outpatient Medications   Medication Sig     acetaminophen (TYLENOL) 500 MG tablet Take 2 tablets (1,000 mg total) by mouth 3 (three) times a day. (Patient taking differently: Take 1,000 mg by mouth every 6 (six) hours as needed.       )     doxazosin (CARDURA) 4 MG tablet Take 1 tablet (4 mg total) by mouth daily.     ferrous sulfate 325 (65 FE) MG tablet Take 1 tablet by mouth 2 (two) times a day.     fluticasone (FLONASE) 50 mcg/actuation nasal spray 1 spray into each nostril daily.     levETIRAcetam (KEPPRA) 500 MG tablet Take 1 tablet (500 mg total) by mouth 2 (two) times a day.     metoprolol tartrate (LOPRESSOR) 25 MG tablet Take 1 tablet (25 mg total) by mouth daily.     multivitamin therapeutic tablet Take 1 tablet by mouth daily.     pantoprazole (PROTONIX) 40 MG tablet Take 1 tablet (40 mg total) by mouth daily.     senna-docusate (PERICOLACE) 8.6-50 mg tablet Take  1 tablet by mouth 2 (two) times a day.     tamsulosin (FLOMAX) 0.4 mg cap Take 0.4 mg by mouth.     Past Medical History:    No past medical history on file.        PHYSICAL EXAMINATION  Vitals:    02/25/19 2049   BP: 142/66   Pulse: 77   Resp: 12   Temp: 96.7  F (35.9  C)   SpO2: 98%   Weight: 166 lb (75.3 kg)       Today on physical exam:     GENERAL: Awake, Alert, oriented x3, not in any form of acute distress, answers questions appropriately, follows simple commands, conversant  HEENT: Head is normocephalic with normal hair distribution. No evidence of trauma. Ears: No acute purulent discharge. Eyes: Conjunctivae pink with no scleral jaundice. Nose: Normal mucosa and septum. NECK: Supple with no cervical or supraclavicular lymphadenopathy. Trachea is midline. glasses  CHEST: No tenderness or deformity, no crepitus  LUNG: Clear to auscultation with good chest expansion. There are no crackles or wheezes, normal AP diameter.  BACK: No kyphosis of the thoracic spine. Symmetric, no curvature, ROM normal, no CVA tenderness, no spinal tenderness   CVS: irregularly irregular rhythm, systolic murmur, no rubs, gallops, or heaves,  2+ pulses symmetric in all extremities.  ABDOMEN: Rounded and soft, nontender to palpation, non distended, no masses, no organomegaly, good bowel sounds, no rebound or guarding, no peritoneal signs.   EXTREMITIES: 1+ LLE pedal edema, Left hip two incisions c/d/i, Left hip pain and tenderness with ROM, trace rle edema  SKIN: Warm and dry,   NEUROLOGICAL: The patient is oriented to person, place and time but is forgetful at times. No movement disorder, no arm drift, visual changes, no facial droop, unilateral weakness, mild numbness and tingling in left leg. Near fall this am            LABS:   Recent Results (from the past 168 hour(s))   HM2 (CBC W/O DIFF)   Result Value Ref Range    WBC 9.1 4.0 - 11.0 thou/uL    RBC 3.48 (L) 4.40 - 6.20 mill/uL    Hemoglobin 9.6 (L) 14.0 - 18.0 g/dL     Hematocrit 31.9 (L) 40.0 - 54.0 %    MCV 92 80 - 100 fL    MCH 27.6 27.0 - 34.0 pg    MCHC 30.1 (L) 32.0 - 36.0 g/dL    RDW 19.6 (H) 11.0 - 14.5 %    Platelets 256 140 - 440 thou/uL    MPV 9.6 8.5 - 12.5 fL   Ferritin   Result Value Ref Range    Ferritin 93 27 - 300 ng/mL   Iron and Transferrin Iron Binding Capacity   Result Value Ref Range    Iron 40 (L) 42 - 175 ug/dL    Transferrin 227 212 - 360 mg/dL    Transferrin Saturation, Calculated 14 (L) 20 - 50 %    Transferrin IBC, Calculated 283 (L) 313 - 563 ug/dL     Results for orders placed or performed in visit on 02/05/19   Basic Metabolic Panel   Result Value Ref Range    Sodium 138 136 - 145 mmol/L    Potassium 3.8 3.5 - 5.0 mmol/L    Chloride 104 98 - 107 mmol/L    CO2 26 22 - 31 mmol/L    Anion Gap, Calculation 8 5 - 18 mmol/L    Glucose 87 70 - 125 mg/dL    Calcium 8.7 8.5 - 10.5 mg/dL    BUN 20 8 - 28 mg/dL    Creatinine 0.97 0.70 - 1.30 mg/dL    GFR MDRD Af Amer >60 >60 mL/min/1.73m2    GFR MDRD Non Af Amer >60 >60 mL/min/1.73m2         Lab Results   Component Value Date    WBC 9.1 02/19/2019    HGB 9.6 (L) 02/19/2019    HCT 31.9 (L) 02/19/2019    MCV 92 02/19/2019     02/19/2019       Lab Results   Component Value Date    [id number] 637 01/28/2019     No results found for: HGBA1C  Lab Results   Component Value Date    INR 1.18 (H) 01/26/2019    INR 1.27 (H) 07/23/2018    INR 1.21 (H) 05/16/2018     No results found for: [id number]  Lab Results   Component Value Date    TSH 3.19 01/28/2019           ASSESSMENT/PLAN:    1. Subdural hematoma, fall: No headaches, visual changes, no neuro deficits noted. Neuro checks daily. F/u with Neurosurgery in 2 weeks with head CT, scheduled for 2/11-apparently missed CT appt, now rescheduled for 3/1. Continue off aspirin and continue keppra for seizure prophylaxis until then. No recent neuro concerns. Did have near fall this am. Mental status appears at baseline though.   2. Left hip fracture, s/p ORIF:  Incisions c/d/i, 1+ edema LLE. Pain controlled. Tylenol prn. F/u with ortho in 2 weeks-no appt scheduled, needs f/u appt. tevin hose. Ice prn. PT, OT.  Stopped oxycodone as pain improved.   3. Moderate-severe AS: Was being evaluated for LAAO and TAVR procedure prior to hospital stay. Will need to f/u with cardiology once more stable.   4. BPH, straight cath: On doxazosin and added flomax  straight cath four times a day as doing at home and f/u with urology after discharge. No recent concerns, staff assisting patient in doing.   5. A fibrillation: Rate controlled in 70s. On metoprolol. hold parameters. No anticoagulants, antiplatelets until cleared per neurosurgery.   6. Macdonald's esophagus, h/o GI bleed: On pantoprazole. No current issues.   7. Iron deficiency anemia, acute blood loss anemia: Follows with hem/onc for weekly/biweekly hg levels, blood transfusions, iron infusions. On folic acid. Hg 8 on 1/29. 8.7 on 2/5. HG 8.5 on 2/11, stable. Appt with hematology and infusion clinic on 2/19-iron and transferrin, ferritin stable no need for iv infusion of iron. Hg improved to 9.6. Recommended iron two times a day so this was restarted. F/u again on 4/2. Recommended gi follow up.   8. CKD stage 3: Cr level 1.03 on 1/30. Cr 0.97 on 2/5. Stable.   9. UTI: completed keflex.   10. Allergic rhinitis: On flonase.   11. Constipation: On senna docusate two times a day.   12. Near fall: Discussed with staff and may need alarms for safety. Staff do not report recent concerns with self transferring. Will monitor closely. No fall and no injury this morning.     Per therapy soft LCD 3/4 sbaA for toileting and dressing, ambulates 20-35 feet with cga, 11 on slums, 4.2 on cpt.  PT, OT, HHA.     Electronically signed by: Homa Smyth NP    Time spent in interview and examination of patient, review of available records, and discussion with nursing staff. Continue care plan, efforts at therapy, and monitor nutritional  status.

## 2021-06-24 NOTE — PATIENT INSTRUCTIONS - HE
1.  May increase activity as tolerated and following ortho recommendations for hip.   2.  Follow up as needed.   3.  Avoid falls.   4.  Stop Keppra

## 2021-06-24 NOTE — PROGRESS NOTES
[facility]    DATE: 2019    NAME:  Lopez Mcdermott             :  1929  MRN: [id number]  CODE STATUS:  FULL CODE    VISIT TYPE: Review Of Multiple Medical Conditions     FACILITY:  [facility] [[id number]]       CHIEF COMPLAIN/REASON FOR VISIT:    Chief Complaint   Patient presents with     Review Of Multiple Medical Conditions               HISTORY OF PRESENT ILLNESS: Lopez Mcdermott is a 89 y.o. male who was admitted - for fall and found to have subdural hematoma on CT and Left comminuted intertrochanteric hip fracture. Neurosurgery was consulted and recommended conservative management. He was monitored in ICU and moved to general floor. His blood pressure was optimized <150. Neurology was consulted for possible seizure activity and recommended keppra. He underwent ORIF of Left hip fracture on . He was noted to have mod to severe AS and cardiology was consulted and recommended outpatient eval for TAVR procedure. He was also treated for UTI and completed 7 days of keflex. He was transferred to [facility] for further rehab. He has PMH of moderate to severe AS, BPH, anemia, CKD stage 3, A fib, gupta esophagus. Prior to this he lived at home with his son and daughter in law.     Today Mr. Mcdermott is seen in his wheelchair in his room today. He reports he had some pain earlier this morning but now it feels much better. He says he thinks the nurse went to get him pain medicine and now is not sure if he needs it or not. He thinks he should probably take it just because it was hurting. He says his pain has been doing much better and even having little to no pain in therapy now. He takes the tylenol when he needs it. He says his bowels are moving nearly every day. He had one yesterday. He says he sleeps fine and appetite is good. He thinks therapy is going well but not sure how much longer he will be here yet. He denies any other concerns  today. His weights have been stable at 166lbs. No appt yet for ortho but sees neurosurgery on 3/1.     REVIEW OF SYSTEMS:  PROBLEMS AND REVIEW OF SYSTEMS:   Review of Systems  Today on ROS:   Currently, no fever, chills, or rigors. Decreased vision and hearing. Denies any chest pain, palpitations, lightheadedness, dizziness. Appetite is good. Denies any GERD symptoms. Denies any difficulty with swallowing, nausea, or vomiting.  Denies any abdominal pain, diarrhea or constipation. No active bleeding. No rash. Positive for left hip incisions, left hip pain, no visual changes, headaches, shortness of breath with exertion, swelling in left leg, numbness left leg, urinary retention-straight cath 4 times daily      No Known Allergies  Current Outpatient Medications   Medication Sig     acetaminophen (TYLENOL) 500 MG tablet Take 2 tablets (1,000 mg total) by mouth 3 (three) times a day. (Patient taking differently: Take 1,000 mg by mouth every 6 (six) hours as needed.       )     doxazosin (CARDURA) 4 MG tablet Take 1 tablet (4 mg total) by mouth daily.     ferrous sulfate 325 (65 FE) MG tablet Take 1 tablet by mouth 2 (two) times a day.     fluticasone (FLONASE) 50 mcg/actuation nasal spray 1 spray into each nostril daily.     levETIRAcetam (KEPPRA) 500 MG tablet Take 1 tablet (500 mg total) by mouth 2 (two) times a day.     metoprolol tartrate (LOPRESSOR) 25 MG tablet Take 1 tablet (25 mg total) by mouth daily.     multivitamin therapeutic tablet Take 1 tablet by mouth daily.     pantoprazole (PROTONIX) 40 MG tablet Take 1 tablet (40 mg total) by mouth daily.     senna-docusate (PERICOLACE) 8.6-50 mg tablet Take 1 tablet by mouth 2 (two) times a day.     tamsulosin (FLOMAX) 0.4 mg cap Take 0.4 mg by mouth.     Past Medical History:    No past medical history on file.        PHYSICAL EXAMINATION  Vitals:    02/27/19 2049   BP: 142/66   Pulse: 77   Resp: 12   Temp: 96.7  F (35.9  C)   SpO2: 98%   Weight: 166 lb (75.3 kg)        Today on physical exam:     GENERAL: Awake, Alert, oriented x3, not in any form of acute distress, answers questions appropriately, follows simple commands, conversant  HEENT: Head is normocephalic with normal hair distribution. No evidence of trauma. Ears: No acute purulent discharge. Eyes: Conjunctivae pink with no scleral jaundice. Nose: Normal mucosa and septum. NECK: Supple with no cervical or supraclavicular lymphadenopathy. Trachea is midline. glasses  CHEST: No tenderness or deformity, no crepitus  LUNG: Clear to auscultation with good chest expansion. There are no crackles or wheezes, normal AP diameter.  BACK: No kyphosis of the thoracic spine. Symmetric, no curvature, ROM normal, no CVA tenderness, no spinal tenderness   CVS: irregularly irregular rhythm, systolic murmur, no rubs, gallops, or heaves,  2+ pulses symmetric in all extremities.  ABDOMEN: Rounded and soft, nontender to palpation, non distended, no masses, no organomegaly, good bowel sounds, no rebound or guarding, no peritoneal signs.   EXTREMITIES: 1+ LLE pedal edema, Left hip two incisions healed, Left hip pain and tenderness with ROM, trace rle edema  SKIN: Warm and dry,   NEUROLOGICAL: The patient is oriented to person, place and time but is forgetful at times. No movement disorder, no arm drift, visual changes, no facial droop, unilateral weakness, mild numbness and tingling in left leg.             LABS:   No results found for this or any previous visit (from the past 168 hour(s)).  Results for orders placed or performed in visit on 02/05/19   Basic Metabolic Panel   Result Value Ref Range    Sodium 138 136 - 145 mmol/L    Potassium 3.8 3.5 - 5.0 mmol/L    Chloride 104 98 - 107 mmol/L    CO2 26 22 - 31 mmol/L    Anion Gap, Calculation 8 5 - 18 mmol/L    Glucose 87 70 - 125 mg/dL    Calcium 8.7 8.5 - 10.5 mg/dL    BUN 20 8 - 28 mg/dL    Creatinine 0.97 0.70 - 1.30 mg/dL    GFR MDRD Af Amer >60 >60 mL/min/1.73m2    GFR MDRD Non Af  Amer >60 >60 mL/min/1.73m2         Lab Results   Component Value Date    WBC 9.1 02/19/2019    HGB 9.6 (L) 02/19/2019    HCT 31.9 (L) 02/19/2019    MCV 92 02/19/2019     02/19/2019       Lab Results   Component Value Date    [id number] 637 01/28/2019     No results found for: HGBA1C  Lab Results   Component Value Date    INR 1.18 (H) 01/26/2019    INR 1.27 (H) 07/23/2018    INR 1.21 (H) 05/16/2018     No results found for: [id number]  Lab Results   Component Value Date    TSH 3.19 01/28/2019           ASSESSMENT/PLAN:    1. Subdural hematoma, fall: No headaches, visual changes, no neuro deficits noted. Neuro checks daily. F/u with Neurosurgery in 2 weeks with head CT, scheduled for 2/11-apparently missed CT appt, now rescheduled for 3/1. Continue off aspirin and continue keppra for seizure prophylaxis until then. No recent neuro concerns. Mental status appears at baseline though.   2. Left hip fracture, s/p ORIF: Incisions c/d/i, 1+ edema LLE. F/u with ortho in 2 weeks-no appt scheduled, needs f/u appt. tevin hose. Ice prn. PT, OT.  Stopped oxycodone as pain improved. Pain controlled on tylenol prn only now.   3. Moderate-severe AS: Was being evaluated for LAAO and TAVR procedure prior to hospital stay. Will need to f/u with cardiology once more stable.   4. BPH, straight cath: On doxazosin and added flomax  straight cath four times a day as doing at home and f/u with urology after discharge. No recent concerns, staff assisting patient in doing.   5. A fibrillation: Rate controlled in 70s. On metoprolol. hold parameters. No anticoagulants, antiplatelets until cleared per neurosurgery.   6. Macdonald's esophagus, h/o GI bleed: On pantoprazole. No current issues.   7. Iron deficiency anemia, acute blood loss anemia: Follows with hem/onc for weekly/biweekly hg levels, blood transfusions, iron infusions. On folic acid. Hg 8 on 1/29. 8.7 on 2/5. HG 8.5 on 2/11, stable. Appt with hematology and infusion clinic on  2/19-iron and transferrin, ferritin stable no need for iv infusion of iron. Hg improved to 9.6. Recommended iron two times a day so this was restarted. F/u again on 4/2. Recommended gi follow up.   8. CKD stage 3: Cr level 1.03 on 1/30. Cr 0.97 on 2/5. Stable.   9. UTI: completed keflex.   10. Allergic rhinitis: On flonase.   11. Constipation: On senna docusate two times a day. BM nearly every day.       Per therapy soft LCD 3/11 sbaA for toileting and dressing, ambulates 50 feet with walker and cga, 11 on slums, 4.2 on cpt. HH PT, OT, HHA.     Electronically signed by: Homa Smyth NP    Time spent in interview and examination of patient, review of available records, and discussion with nursing staff. Continue care plan, efforts at therapy, and monitor nutritional status.

## 2021-06-24 NOTE — PROGRESS NOTES
[facility]    DATE: 3/12/2019    NAME:  Lopez Mcdermott             :  1929  MRN: [id number]  CODE STATUS:  FULL CODE    VISIT TYPE: Review Of Multiple Medical Conditions     FACILITY:  [facility] [[id number]]       CHIEF COMPLAIN/REASON FOR VISIT:    Chief Complaint   Patient presents with     Review Of Multiple Medical Conditions               HISTORY OF PRESENT ILLNESS: Lopez Mcdermott is a 89 y.o. male who was admitted - for fall and found to have subdural hematoma on CT and Left comminuted intertrochanteric hip fracture. Neurosurgery was consulted and recommended conservative management. He was monitored in ICU and moved to general floor. His blood pressure was optimized <150. Neurology was consulted for possible seizure activity and recommended keppra. He underwent ORIF of Left hip fracture on . He was noted to have mod to severe AS and cardiology was consulted and recommended outpatient eval for TAVR procedure. He was also treated for UTI and completed 7 days of keflex. He was transferred to [facility] for further rehab. He has PMH of moderate to severe AS, BPH, anemia, CKD stage 3, A fib, gupta esophagus. Prior to this he lived at home with his son and daughter in law.     Today Mr. Mcdermott is seen for routine follow up. He was supposed to be discharging today as he has completed therapy but  reports his family is looking at long term care placement options so he will remain here private pay in the meantime. On exam he reports no pain in his hip today and has been walking and doing well. He is disappointed that he is done with therapy as he feels he could still use it. He hopes the staff will at least walk with him in the hallways or something to keep up his strength. He reports no issues with bowels or breathing lately. He is eating very well and no stomach problems. He doesn't know what the plan is for him now.      REVIEW OF SYSTEMS:  PROBLEMS AND REVIEW OF SYSTEMS:   Review of Systems  Today on ROS:   Currently, no fever, chills, or rigors. Decreased vision and hearing. Denies any chest pain, palpitations, lightheadedness, dizziness. Appetite is good. Denies any GERD symptoms. Denies any difficulty with swallowing, nausea, or vomiting.  Denies any abdominal pain, diarrhea or constipation. No active bleeding. No rash. Positive for left hip incisions healed, left hip pain minimal, no visual changes, headaches, no recent shortness of breath, no further swelling in left leg, no numbness left leg today, urinary retention-straight cath 4 times daily      No Known Allergies  Current Outpatient Medications   Medication Sig     acetaminophen (TYLENOL) 500 MG tablet Take 2 tablets (1,000 mg total) by mouth 3 (three) times a day. (Patient taking differently: Take 1,000 mg by mouth every 6 (six) hours as needed.       )     doxazosin (CARDURA) 4 MG tablet Take 1 tablet (4 mg total) by mouth daily.     ferrous sulfate 325 (65 FE) MG tablet Take 1 tablet by mouth 2 (two) times a day.     fluticasone (FLONASE) 50 mcg/actuation nasal spray 1 spray into each nostril daily.     levETIRAcetam (KEPPRA) 500 MG tablet Take 1 tablet (500 mg total) by mouth 2 (two) times a day.     metoprolol tartrate (LOPRESSOR) 25 MG tablet Take 1 tablet (25 mg total) by mouth daily.     multivitamin therapeutic tablet Take 1 tablet by mouth daily.     pantoprazole (PROTONIX) 40 MG tablet Take 1 tablet (40 mg total) by mouth daily.     senna-docusate (PERICOLACE) 8.6-50 mg tablet Take 1 tablet by mouth 2 (two) times a day.     tamsulosin (FLOMAX) 0.4 mg cap Take 0.4 mg by mouth.     Past Medical History:    No past medical history on file.        PHYSICAL EXAMINATION  Vitals:    03/12/19 0700   BP: 157/66   Pulse: 64   Resp: 18   Temp: 97.8  F (36.6  C)   SpO2: 96%   Weight: 169 lb (76.7 kg)       Today on physical exam:     GENERAL: Awake, Alert, oriented x3,  not in any form of acute distress, answers questions appropriately, follows simple commands, conversant  HEENT: Head is normocephalic with normal hair distribution. No evidence of trauma. Ears: No acute purulent discharge. Eyes: Conjunctivae pink with no scleral jaundice. Nose: Normal mucosa and septum. NECK: Supple with no cervical or supraclavicular lymphadenopathy. Trachea is midline. glasses  CHEST: No tenderness or deformity, no crepitus  LUNG: Clear to auscultation with good chest expansion. There are no crackles or wheezes, normal AP diameter.  BACK: No kyphosis of the thoracic spine. Symmetric, no curvature, ROM normal, no CVA tenderness, no spinal tenderness   CVS: irregularly irregular rhythm, systolic murmur, no rubs, gallops, or heaves,  2+ pulses symmetric in all extremities.  ABDOMEN: Rounded and soft, nontender to palpation, non distended, no masses, no organomegaly, good bowel sounds, no rebound or guarding, no peritoneal signs.   EXTREMITIES: no LLE pedal edema, Left hip two incisions healed, minimal Left hip pain and tenderness with ROM, no rle edema  SKIN: Warm and dry,   NEUROLOGICAL: The patient is oriented to person, place and time but is forgetful at times. No movement disorder, no arm drift, visual changes, no facial droop, unilateral weakness, no numbness and tingling in left leg today            LABS:   No results found for this or any previous visit (from the past 168 hour(s)).  Results for orders placed or performed in visit on 02/05/19   Basic Metabolic Panel   Result Value Ref Range    Sodium 138 136 - 145 mmol/L    Potassium 3.8 3.5 - 5.0 mmol/L    Chloride 104 98 - 107 mmol/L    CO2 26 22 - 31 mmol/L    Anion Gap, Calculation 8 5 - 18 mmol/L    Glucose 87 70 - 125 mg/dL    Calcium 8.7 8.5 - 10.5 mg/dL    BUN 20 8 - 28 mg/dL    Creatinine 0.97 0.70 - 1.30 mg/dL    GFR MDRD Af Amer >60 >60 mL/min/1.73m2    GFR MDRD Non Af Amer >60 >60 mL/min/1.73m2         Lab Results   Component Value  Date    WBC 9.1 02/19/2019    HGB 9.6 (L) 02/19/2019    HCT 31.9 (L) 02/19/2019    MCV 92 02/19/2019     02/19/2019       Lab Results   Component Value Date    [id number] 637 01/28/2019     No results found for: HGBA1C  Lab Results   Component Value Date    INR 1.18 (H) 01/26/2019    INR 1.27 (H) 07/23/2018    INR 1.21 (H) 05/16/2018     No results found for: [id number]  Lab Results   Component Value Date    TSH 3.19 01/28/2019           ASSESSMENT/PLAN:    1. Subdural hematoma, fall: No headaches, visual changes, no neuro deficits noted. Neuro checks daily. F/u with Neurosurgery 3/1-stopped keppra, increase activity as tolerated, avoid falls, f/u prn.  No recent neuro concerns. CT showed interval resolution of left frontoparietal low attenuation subdural hematoma, interval resolution of small amt of intraventricular hemorrhage left occipital horn, mod generalized volume loss and chronic ischemic changes.   2. Left hip fracture, s/p ORIF: Incisions c/d/i, 1+ edema LLE. F/u with ortho on 3/13. tevin hose. Ice prn. PT, OT.  Pain controlled tylenol prn    3. Moderate-severe AS: Was being evaluated for LAAO and TAVR procedure prior to hospital stay. Will need to f/u with cardiology once more stable.   4. BPH, straight cath: On doxazosin and added flomax  straight cath four times a day as doing at home and f/u with urology after discharge. No recent concerns, staff assisting patient in doing.   5. A fibrillation: Rate controlled in 60s. On metoprolol. hold parameters. No anticoagulants, antiplatelets until cleared per neurosurgery-reviewed 3/1 note and Ct showed interval resolution of hemorrhage. continue off antiplatelets, anticoagulants due to anemia and recent SDH for now. F/u with cardiology.   6. Macdonald's esophagus, h/o GI bleed: On pantoprazole. No current issues.   7. Iron deficiency anemia, acute blood loss anemia: Follows with hem/onc for weekly/biweekly hg levels, blood transfusions, iron infusions. On  folic acid. Hg 8 on 1/29. 8.7 on 2/5. HG 8.5 on 2/11, stable. Appt with hematology and infusion clinic on 2/19-iron and transferrin, ferritin stable no need for iv infusion of iron. Hg improved to 9.6. Recommended iron two times a day so this was restarted. F/u again on 4/2. Recommended gi follow up.   8. CKD stage 3: Cr level 1.03 on 1/30. Cr 0.97 on 2/5. Stable.   9. UTI: completed keflex.   10. Allergic rhinitis: On flonase.   11. Constipation: On senna docusate two times a day. BM nearly every day.   12. Cognitive impairment: Only scored 11/30 on slums and 4.2 on cpt. Speech therapy was working on cognition and now completed. Therapy recommending 24 hour supervision for safety. A/o but forgetful.     Per therapy firm LCD 3/11 sba for toileting and dressing, ambulates 75 feet with walker and cga, 11 on slums, 4.2 on cpt. Private pay now awaiting long term care placement.     Electronically signed by: Homa Smyth NP

## 2021-06-24 NOTE — PROGRESS NOTES
[facility]    DATE: 2019    NAME:  Lopez Mcdermott             :  1929  MRN: [id number]  CODE STATUS:  FULL CODE    VISIT TYPE: Review Of Multiple Medical Conditions     FACILITY:  [facility] [[id number]]       CHIEF COMPLAIN/REASON FOR VISIT:    Chief Complaint   Patient presents with     Review Of Multiple Medical Conditions               HISTORY OF PRESENT ILLNESS: Lopez Mcdermott is a 89 y.o. male who was admitted - for fall and found to have subdural hematoma on CT and Left comminuted intertrochanteric hip fracture. Neurosurgery was consulted and recommended conservative management. He was monitored in ICU and moved to general floor. His blood pressure was optimized <150. Neurology was consulted for possible seizure activity and recommended keppra. He underwent ORIF of Left hip fracture on . He was noted to have mod to severe AS and cardiology was consulted and recommended outpatient eval for TAVR procedure. He was also treated for UTI and completed 7 days of keflex. He was transferred to [facility] for further rehab. He has PMH of moderate to severe AS, BPH, anemia, CKD stage 3, A fib, gupta esophagus. Prior to this he lived at home with his son and daughter in law.     Today Mr. Mcdermott is seen for review of systems today. He reports he has been doing his own catheterizing and it is going fine. He says he doesn't have enough time to do this 4 times a day so he is doing it 2-3 times. He does not feel uncomfortable and feels this is enough to empty his bladder. He is still able to go some in between. He says his hip pain was bothering him during the night but is better today. He says he doesn't think he takes many pain pills anymore and has been trying to cut back. He says he sleeps fine but last night he had filled the urine container so then he had to get up and ask for another one. He is not sure if he has an appt to see the  brain surgeon again or not. He denies any shortness of breath or cough and appetite has been good. Per staff he is being straight cathed 4 times daily and staff is helping him do this. He has a care conference today and appt with hematology today. Staff have called multiple times to neurosurgery and left messages to schedule an appt but one has not been made yet.     REVIEW OF SYSTEMS:  PROBLEMS AND REVIEW OF SYSTEMS:   Review of Systems  Today on ROS:   Currently, no fever, chills, or rigors. Decreased vision and hearing. Denies any chest pain, palpitations, lightheadedness, dizziness. Appetite is good. Denies any GERD symptoms. Denies any difficulty with swallowing, nausea, or vomiting.  Denies any abdominal pain, diarrhea or constipation. No active bleeding. No rash. Positive for left hip incisions, left hip pain, no visual changes, headaches, shortness of breath with exertion, swelling in left leg, numbness left leg, urinary retention-straight cath 4 times daily      No Known Allergies  Current Outpatient Medications   Medication Sig     acetaminophen (TYLENOL) 500 MG tablet Take 2 tablets (1,000 mg total) by mouth 3 (three) times a day.     doxazosin (CARDURA) 4 MG tablet Take 1 tablet (4 mg total) by mouth daily.     ferrous sulfate 325 (65 FE) MG tablet Take 1 tablet by mouth 2 (two) times a day.     fluticasone (FLONASE) 50 mcg/actuation nasal spray 1 spray into each nostril daily.     levETIRAcetam (KEPPRA) 500 MG tablet Take 1 tablet (500 mg total) by mouth 2 (two) times a day.     metoprolol tartrate (LOPRESSOR) 25 MG tablet Take 1 tablet (25 mg total) by mouth daily.     multivitamin therapeutic tablet Take 1 tablet by mouth daily.     oxyCODONE (ROXICODONE) 5 MG immediate release tablet Take 0.5-1 tablets (2.5-5 mg total) by mouth every 4 (four) hours as needed.     pantoprazole (PROTONIX) 40 MG tablet Take 1 tablet (40 mg total) by mouth daily.     senna-docusate (PERICOLACE) 8.6-50 mg tablet Take 1  tablet by mouth 2 (two) times a day.     tamsulosin (FLOMAX) 0.4 mg cap Take 0.4 mg by mouth.     Past Medical History:    No past medical history on file.        PHYSICAL EXAMINATION  Vitals:    02/18/19 2146   BP: 152/67   Pulse: 71   Resp: 18   Temp: 96.6  F (35.9  C)   SpO2: 96%   Weight: 166 lb (75.3 kg)       Today on physical exam:     GENERAL: Awake, Alert, oriented x3, not in any form of acute distress, answers questions appropriately, follows simple commands, conversant  HEENT: Head is normocephalic with normal hair distribution. No evidence of trauma. Ears: No acute purulent discharge. Eyes: Conjunctivae pink with no scleral jaundice. Nose: Normal mucosa and septum. NECK: Supple with no cervical or supraclavicular lymphadenopathy. Trachea is midline. glasses  CHEST: No tenderness or deformity, no crepitus  LUNG: Clear to auscultation with good chest expansion. There are no crackles or wheezes, normal AP diameter.  BACK: No kyphosis of the thoracic spine. Symmetric, no curvature, ROM normal, no CVA tenderness, no spinal tenderness   CVS: irregularly irregular rhythm, systolic murmur, no rubs, gallops, or heaves,  2+ pulses symmetric in all extremities.  ABDOMEN: Rounded and soft, nontender to palpation, non distended, no masses, no organomegaly, good bowel sounds, no rebound or guarding, no peritoneal signs.   EXTREMITIES: trace LLE pedal edema, Left hip two incisions c/d/i, Left hip pain and tenderness with ROM  SKIN: Warm and dry,   NEUROLOGICAL: The patient is oriented to person, place and time but is forgetful at times. No movement disorder, no arm drift, visual changes, no facial droop, unilateral weakness, mild numbness and tingling in left leg.             LABS:   No results found for this or any previous visit (from the past 168 hour(s)).  Results for orders placed or performed in visit on 02/05/19   Basic Metabolic Panel   Result Value Ref Range    Sodium 138 136 - 145 mmol/L    Potassium 3.8 3.5  - 5.0 mmol/L    Chloride 104 98 - 107 mmol/L    CO2 26 22 - 31 mmol/L    Anion Gap, Calculation 8 5 - 18 mmol/L    Glucose 87 70 - 125 mg/dL    Calcium 8.7 8.5 - 10.5 mg/dL    BUN 20 8 - 28 mg/dL    Creatinine 0.97 0.70 - 1.30 mg/dL    GFR MDRD Af Amer >60 >60 mL/min/1.73m2    GFR MDRD Non Af Amer >60 >60 mL/min/1.73m2         Lab Results   Component Value Date    WBC 9.3 02/05/2019    HGB 8.5 (L) 02/11/2019    HCT 29.8 (L) 02/05/2019    MCV 91 02/05/2019     02/05/2019       Lab Results   Component Value Date    [id number] 637 01/28/2019     No results found for: HGBA1C  Lab Results   Component Value Date    INR 1.18 (H) 01/26/2019    INR 1.27 (H) 07/23/2018    INR 1.21 (H) 05/16/2018     No results found for: [id number]  Lab Results   Component Value Date    TSH 3.19 01/28/2019           ASSESSMENT/PLAN:    1. Subdural hematoma, fall: No headaches, visual changes, no neuro deficits noted. Neuro checks daily. F/u with Neurosurgery in 2 weeks with head CT, scheduled for 2/11-apparently missed CT appt, spoke to staff and multiple calls but no appt scheduled. Keppra for seizure prophylaxis.   2. Left hip fracture, s/p ORIF: Incisions c/d/i, 1+ edema LLE. Pain controlled. Tylenol three times a day, oxycodone prn. F/u with ortho in 2 weeks. tevin hose. Ice prn. PT, OT. Needs f/u appt scheduled. Reviewed oxycodone use, using once a day for last couple days before that not used for few days. Will plan to dc at next visit most likely as pain improving.   3. Moderate-severe AS: Was being evaluated for LAAO and TAVR procedure prior to hospital stay. Will need to f/u with cardiology once more stable.   4. BPH, Jimenez catheter: On doxazosin and added flomas. Jimenez catheter in place, appears was placed on 1/26 with no void trial inpatient. No h/o urinary retention in medical records however pt now reporting that he had been straight cathing himself at home as needed but prior to hospital stay was up to 4 times per day.  Failed void trial 2/7 and 2/14. Scheduled straight cath four times a day as doing at home and f/u with urology after discharge. No recent concerns, staff assisting patient in doing.   5. A fibrillation: Rate controlled in 70s. On metoprolol. hold parameters. No anticoagulants, antiplatelets until cleared per neurosurgery.   6. Macdonald's esophagus, h/o GI bleed: On pantoprazole. No current issues.   7. Iron deficiency anemia, acute blood loss anemia: Follows with hem/onc for weekly/biweekly hg levels, blood transfusions, iron infusions. On folic acid. Hg 8 on 1/29. 8.7 on 2/5. HG 8.5 on 2/11, stable. Appt with hematology and infusion clinic on 2/19.   8. CKD stage 3: Cr level 1.03 on 1/30. Cr 0.97 on 2/5. Stable.   9. UTI: completed keflex.   10. Allergic rhinitis: On flonase.   11. Constipation: On senna docusate two times a day.     Per therapy soft LCD 2/26 CGA for toileting and dressing, ambulates 35 feet with cga, 11 on slums, 4.2 on cpt, care conference today.     Electronically signed by: Homa Smyth NP    Time spent in interview and examination of patient, review of available records, and discussion with nursing staff. Continue care plan, efforts at therapy, and monitor nutritional status.

## 2021-06-24 NOTE — TELEPHONE ENCOUNTER
----- Message from Vania Nur MD sent at 2/20/2019  8:19 AM CST -----  Please call and let [facility] know that the patient appears to be iron replete at this time.  No need for IV iron at this time.  Please asked him to continue with the oral iron tablets and multivitamin tablets daily.  Follow-up as planned in 6 weeks.    Vania Nur MD

## 2021-06-24 NOTE — PROGRESS NOTES
[facility]    DATE: 3/8/2019    NAME:  Lopez Mcdermott             :  1929  MRN: [id number]  CODE STATUS:  FULL CODE    VISIT TYPE: Review Of Multiple Medical Conditions     FACILITY:  [facility] [[id number]]       CHIEF COMPLAIN/REASON FOR VISIT:    Chief Complaint   Patient presents with     Review Of Multiple Medical Conditions               HISTORY OF PRESENT ILLNESS: Lopez Mcdermott is a 89 y.o. male who was admitted - for fall and found to have subdural hematoma on CT and Left comminuted intertrochanteric hip fracture. Neurosurgery was consulted and recommended conservative management. He was monitored in ICU and moved to general floor. His blood pressure was optimized <150. Neurology was consulted for possible seizure activity and recommended keppra. He underwent ORIF of Left hip fracture on . He was noted to have mod to severe AS and cardiology was consulted and recommended outpatient eval for TAVR procedure. He was also treated for UTI and completed 7 days of keflex. He was transferred to [facility] for further rehab. He has PMH of moderate to severe AS, BPH, anemia, CKD stage 3, A fib, gupta esophagus. Prior to this he lived at home with his son and daughter in law.     Today Mr. Mcdermott is seen for review of systems. He is seen in his bed and notes that he had a little hip pain overnight but overall his pain has bee much improved. He says he has no concerns with his incision and thinks it is all healed up. His bowels are still working fine and appetite is good. No recent dizziness or nausea. Therapy is going well and he says they gave him a discharge date for next week. He plans to return home. He is not sure if they have a follow up with ortho for him. He denies any other concerns today. Per staff vitals have been stable and no recent complaints of pain. He does have an appt scheduled with ortho for 3/13. He will be discharging  on 3/12. Therapy is recommending 24 hour supervision due to cognitive concerns so he will be having a care conference with family to discuss discharge planning.     REVIEW OF SYSTEMS:  PROBLEMS AND REVIEW OF SYSTEMS:   Review of Systems  Today on ROS:   Currently, no fever, chills, or rigors. Decreased vision and hearing. Denies any chest pain, palpitations, lightheadedness, dizziness. Appetite is good. Denies any GERD symptoms. Denies any difficulty with swallowing, nausea, or vomiting.  Denies any abdominal pain, diarrhea or constipation. No active bleeding. No rash. Positive for left hip incisions healed, left hip pain minimal, no visual changes, headaches, no recent shortness of breath, no further swelling in left leg, no numbness left leg today, urinary retention-straight cath 4 times daily      No Known Allergies  Current Outpatient Medications   Medication Sig     acetaminophen (TYLENOL) 500 MG tablet Take 2 tablets (1,000 mg total) by mouth 3 (three) times a day. (Patient taking differently: Take 1,000 mg by mouth every 6 (six) hours as needed.       )     doxazosin (CARDURA) 4 MG tablet Take 1 tablet (4 mg total) by mouth daily.     ferrous sulfate 325 (65 FE) MG tablet Take 1 tablet by mouth 2 (two) times a day.     fluticasone (FLONASE) 50 mcg/actuation nasal spray 1 spray into each nostril daily.     levETIRAcetam (KEPPRA) 500 MG tablet Take 1 tablet (500 mg total) by mouth 2 (two) times a day.     metoprolol tartrate (LOPRESSOR) 25 MG tablet Take 1 tablet (25 mg total) by mouth daily.     multivitamin therapeutic tablet Take 1 tablet by mouth daily.     pantoprazole (PROTONIX) 40 MG tablet Take 1 tablet (40 mg total) by mouth daily.     senna-docusate (PERICOLACE) 8.6-50 mg tablet Take 1 tablet by mouth 2 (two) times a day.     tamsulosin (FLOMAX) 0.4 mg cap Take 0.4 mg by mouth.     Past Medical History:    No past medical history on file.        PHYSICAL EXAMINATION  Vitals:    03/07/19 2102   BP:  123/54   Pulse: 81   Resp: 18   Temp: (!) 96  F (35.6  C)   SpO2: 97%   Weight: 169 lb (76.7 kg)       Today on physical exam:     GENERAL: Awake, Alert, oriented x3, not in any form of acute distress, answers questions appropriately, follows simple commands, conversant  HEENT: Head is normocephalic with normal hair distribution. No evidence of trauma. Ears: No acute purulent discharge. Eyes: Conjunctivae pink with no scleral jaundice. Nose: Normal mucosa and septum. NECK: Supple with no cervical or supraclavicular lymphadenopathy. Trachea is midline. glasses  CHEST: No tenderness or deformity, no crepitus  LUNG: Clear to auscultation with good chest expansion. There are no crackles or wheezes, normal AP diameter.  BACK: No kyphosis of the thoracic spine. Symmetric, no curvature, ROM normal, no CVA tenderness, no spinal tenderness   CVS: irregularly irregular rhythm, systolic murmur, no rubs, gallops, or heaves,  2+ pulses symmetric in all extremities.  ABDOMEN: Rounded and soft, nontender to palpation, non distended, no masses, no organomegaly, good bowel sounds, no rebound or guarding, no peritoneal signs.   EXTREMITIES: no LLE pedal edema, Left hip two incisions healed, minimal Left hip pain and tenderness with ROM, no rle edema  SKIN: Warm and dry,   NEUROLOGICAL: The patient is oriented to person, place and time but is forgetful at times. No movement disorder, no arm drift, visual changes, no facial droop, unilateral weakness, no numbness and tingling in left leg today            LABS:   No results found for this or any previous visit (from the past 168 hour(s)).  Results for orders placed or performed in visit on 02/05/19   Basic Metabolic Panel   Result Value Ref Range    Sodium 138 136 - 145 mmol/L    Potassium 3.8 3.5 - 5.0 mmol/L    Chloride 104 98 - 107 mmol/L    CO2 26 22 - 31 mmol/L    Anion Gap, Calculation 8 5 - 18 mmol/L    Glucose 87 70 - 125 mg/dL    Calcium 8.7 8.5 - 10.5 mg/dL    BUN 20 8 - 28  mg/dL    Creatinine 0.97 0.70 - 1.30 mg/dL    GFR MDRD Af Amer >60 >60 mL/min/1.73m2    GFR MDRD Non Af Amer >60 >60 mL/min/1.73m2         Lab Results   Component Value Date    WBC 9.1 02/19/2019    HGB 9.6 (L) 02/19/2019    HCT 31.9 (L) 02/19/2019    MCV 92 02/19/2019     02/19/2019       Lab Results   Component Value Date    [id number] 637 01/28/2019     No results found for: HGBA1C  Lab Results   Component Value Date    INR 1.18 (H) 01/26/2019    INR 1.27 (H) 07/23/2018    INR 1.21 (H) 05/16/2018     No results found for: [id number]  Lab Results   Component Value Date    TSH 3.19 01/28/2019           ASSESSMENT/PLAN:    1. Subdural hematoma, fall: No headaches, visual changes, no neuro deficits noted. Neuro checks daily. F/u with Neurosurgery in 2 weeks with head CT, scheduled for 2/11-apparently missed CT appt, now rescheduled for 3/1-stopped keppra, increase activity as tolerated, avoid falls, f/u prn.  No recent neuro concerns. CT showed interval resolution of left frontoparietal low attenuation subdural hematoma, interval resolution of small amt of intraventricular hemorrhage left occipital horn, mod generalized volume loss and chronic ischemic changes.   2. Left hip fracture, s/p ORIF: Incisions c/d/i, 1+ edema LLE. F/u with ortho on 3/13. tevin hose. Ice prn. PT, OT.  Pain controlled tylenol prn    3. Moderate-severe AS: Was being evaluated for LAAO and TAVR procedure prior to hospital stay. Will need to f/u with cardiology once more stable.   4. BPH, straight cath: On doxazosin and added flomax  straight cath four times a day as doing at home and f/u with urology after discharge. No recent concerns, staff assisting patient in doing.   5. A fibrillation: Rate controlled in 80s. On metoprolol. hold parameters. No anticoagulants, antiplatelets until cleared per neurosurgery-reviewed 3/1 note and Ct showed interval resolution of hemorrhage. continue off antiplatelets, anticoagulants due to anemia and  recent SDH for now. F/u with cardiology regarding.   6. Macdonald's esophagus, h/o GI bleed: On pantoprazole. No current issues.   7. Iron deficiency anemia, acute blood loss anemia: Follows with hem/onc for weekly/biweekly hg levels, blood transfusions, iron infusions. On folic acid. Hg 8 on 1/29. 8.7 on 2/5. HG 8.5 on 2/11, stable. Appt with hematology and infusion clinic on 2/19-iron and transferrin, ferritin stable no need for iv infusion of iron. Hg improved to 9.6. Recommended iron two times a day so this was restarted. F/u again on 4/2. Recommended gi follow up.   8. CKD stage 3: Cr level 1.03 on 1/30. Cr 0.97 on 2/5. Stable.   9. UTI: completed keflex.   10. Allergic rhinitis: On flonase.   11. Constipation: On senna docusate two times a day. BM nearly every day.   12. Cognitive impairment: Only scored 11/30 on slums and 4.2 on cpt. Speech therapy was working on cognition and now completed. Therapy recommending 24 hour supervision for safety. A/o but forgetful.     Per therapy firm LCD 3/11 sba for toileting and dressing, ambulates 75 feet with walker and cga, 11 on slums, 4.2 on cpt. HH PT, OT, HHA. Recommending 24 hour supervision due to cognition.     Electronically signed by: Homa Smyth NP

## 2021-06-24 NOTE — PROGRESS NOTES
[facility]    DATE: 2/15/2019    NAME:  Lopez Mcdermott             :  1929  MRN: [id number]  CODE STATUS:  FULL CODE    VISIT TYPE: Problem Visit (urinary retention)     FACILITY:  [facility] [[id number]]       CHIEF COMPLAIN/REASON FOR VISIT:    Chief Complaint   Patient presents with     Problem Visit     urinary retention               HISTORY OF PRESENT ILLNESS: Lopez Mcdermott is a 89 y.o. male who was admitted - for fall and found to have subdural hematoma on CT and Left comminuted intertrochanteric hip fracture. Neurosurgery was consulted and recommended conservative management. He was monitored in ICU and moved to general floor. His blood pressure was optimized <150. Neurology was consulted for possible seizure activity and recommended keppra. He underwent ORIF of Left hip fracture on . He was noted to have mod to severe AS and cardiology was consulted and recommended outpatient eval for TAVR procedure. He was also treated for UTI and completed 7 days of keflex. He was transferred to [facility] for further rehab. He has PMH of moderate to severe AS, BPH, anemia, CKD stage 3, A fib, gputa esophagus. Prior to this he lived at home with his son and daughter in law.     Today Mr. Mcdermott is seen for follow up on urinary retention. His garcia was removed yesterday and he has been able to void some. The nurse this morning had not yet bladder scanned him so unsure how much he is retaining. On exam Mr. Mcdermott now states he had been straight cathing himself at home before. He has never had a permanent catheter but would do this a couple times per day. He says right before he went to the hospital he was up to doing this 4 times per day. He would like to go back on this schedule like he was doing before. He would like to be able to do it himself but admits he is not very steady on his feet yet. He denies any pain or burning when he does go a  little on his own. He says he did have a rough night and had a lot of hip pain. It seems a little better this morning and has been doing fine in therapy. He denies other concerns today. Per chart review he is going on 2/19 to hematology for iron infusion and follow up on anemia.     REVIEW OF SYSTEMS:  PROBLEMS AND REVIEW OF SYSTEMS:   Review of Systems  Today on ROS:   Currently, no fever, chills, or rigors. Decreased vision and hearing. Denies any chest pain, palpitations, lightheadedness, dizziness. Appetite is good. Denies any GERD symptoms. Denies any difficulty with swallowing, nausea, or vomiting.  Denies any abdominal pain, diarrhea or constipation. No active bleeding. No rash. Positive for left hip incisions, left hip pain, no visual changes, headaches, shortness of breath with exertion, weakness, EZ stand for transfers, swelling in left leg, numbness left leg, urinary retention      No Known Allergies  Current Outpatient Medications   Medication Sig     acetaminophen (TYLENOL) 500 MG tablet Take 2 tablets (1,000 mg total) by mouth 3 (three) times a day.     doxazosin (CARDURA) 4 MG tablet Take 1 tablet (4 mg total) by mouth daily.     ferrous sulfate 325 (65 FE) MG tablet Take 1 tablet by mouth 2 (two) times a day.     fluticasone (FLONASE) 50 mcg/actuation nasal spray 1 spray into each nostril daily.     levETIRAcetam (KEPPRA) 500 MG tablet Take 1 tablet (500 mg total) by mouth 2 (two) times a day.     metoprolol tartrate (LOPRESSOR) 25 MG tablet Take 1 tablet (25 mg total) by mouth daily.     multivitamin therapeutic tablet Take 1 tablet by mouth daily.     oxyCODONE (ROXICODONE) 5 MG immediate release tablet Take 0.5-1 tablets (2.5-5 mg total) by mouth every 4 (four) hours as needed.     pantoprazole (PROTONIX) 40 MG tablet Take 1 tablet (40 mg total) by mouth daily.     senna-docusate (PERICOLACE) 8.6-50 mg tablet Take 1 tablet by mouth 2 (two) times a day.     tamsulosin (FLOMAX) 0.4 mg cap Take 0.4  mg by mouth.     Past Medical History:    No past medical history on file.        PHYSICAL EXAMINATION  Vitals:    02/14/19 2018   BP: 150/79   Pulse: 95   Resp: 16   Temp: (!) 96.1  F (35.6  C)   SpO2: 96%   Weight: 155 lb (70.3 kg)       Today on physical exam:     GENERAL: Awake, Alert, oriented x3, not in any form of acute distress, answers questions appropriately, follows simple commands, conversant  HEENT: Head is normocephalic with normal hair distribution. No evidence of trauma. Ears: No acute purulent discharge. Eyes: Conjunctivae pink with no scleral jaundice. Nose: Normal mucosa and septum. NECK: Supple with no cervical or supraclavicular lymphadenopathy. Trachea is midline. glasses  CHEST: No tenderness or deformity, no crepitus  LUNG: Clear to auscultation with good chest expansion. There are no crackles or wheezes, normal AP diameter.  BACK: No kyphosis of the thoracic spine. Symmetric, no curvature, ROM normal, no CVA tenderness, no spinal tenderness   CVS: irregularly irregular rhythm, systolic murmur, no rubs, gallops, or heaves,  2+ pulses symmetric in all extremities.  ABDOMEN: Rounded and soft, nontender to palpation, non distended, no masses, no organomegaly, good bowel sounds, no rebound or guarding, no peritoneal signs.   EXTREMITIES: 1+ LLE pedal edema, Left hip two incisions c/d/i, Left hip pain and tenderness with ROM  SKIN: Warm and dry, scattered ecchymosis, two small abrasions to Left forearm and elbow  NEUROLOGICAL: The patient is oriented to person, place and time but is forgetful at times. No movement disorder, no arm drift, visual changes, no facial droop, unilateral weakness, mild numbness and tingling in left leg.             LABS:   Recent Results (from the past 168 hour(s))   Hemoglobin   Result Value Ref Range    Hemoglobin 8.5 (L) 14.0 - 18.0 g/dL     Results for orders placed or performed in visit on 02/05/19   Basic Metabolic Panel   Result Value Ref Range    Sodium 138 136 -  145 mmol/L    Potassium 3.8 3.5 - 5.0 mmol/L    Chloride 104 98 - 107 mmol/L    CO2 26 22 - 31 mmol/L    Anion Gap, Calculation 8 5 - 18 mmol/L    Glucose 87 70 - 125 mg/dL    Calcium 8.7 8.5 - 10.5 mg/dL    BUN 20 8 - 28 mg/dL    Creatinine 0.97 0.70 - 1.30 mg/dL    GFR MDRD Af Amer >60 >60 mL/min/1.73m2    GFR MDRD Non Af Amer >60 >60 mL/min/1.73m2         Lab Results   Component Value Date    WBC 9.3 02/05/2019    HGB 8.5 (L) 02/11/2019    HCT 29.8 (L) 02/05/2019    MCV 91 02/05/2019     02/05/2019       Lab Results   Component Value Date    [id number] 637 01/28/2019     No results found for: HGBA1C  Lab Results   Component Value Date    INR 1.18 (H) 01/26/2019    INR 1.27 (H) 07/23/2018    INR 1.21 (H) 05/16/2018     No results found for: [id number]  Lab Results   Component Value Date    TSH 3.19 01/28/2019           ASSESSMENT/PLAN:    1. Subdural hematoma, fall: No headaches, visual changes, no neuro deficits noted. Neuro checks daily. F/u with Neurosurgery in 2 weeks with head CT, scheduled for 2/11-apparently missed CT appt, still no appt on calendar today, again discussed with staff need for follow up. Keppra for seizure prophylaxis.   2. Left hip fracture, s/p ORIF: Incisions c/d/i, 1+ edema LLE. Pain controlled. Tylenol three times a day, oxycodone prn. F/u with ortho in 2 weeks. tevin hose. Ice prn. PT, OT. Needs f/u appt scheduled.   3. Moderate-severe AS: Was being evaluated for LAAO and TAVR procedure prior to hospital stay. Will need to f/u with cardiology once more stable.   4. BPH, Jimenez catheter: On doxazosin and added flomas. Jimenez catheter in place, appears was placed on 1/26 with no void trial inpatient. No h/o urinary retention in medical records however pt now reporting that he had been straight cathing himself at home as needed but prior to hospital stay was up to 4 times per day. Failed void trial 2/7 and 2/14. Will schedule straight cath four times a day as doing at home and f/u  with urology after discharge.   5. A fibrillation: Rate controlled in 70s. On metoprolol. hold parameters. No anticoagulants, antiplatelets until cleared per neurosurgery.   6. Macdonald's esophagus, h/o GI bleed: On pantoprazole. No current issues.   7. Iron deficiency anemia, acute blood loss anemia: Follows with hem/onc for weekly/biweekly hg levels, blood transfusions, iron infusions. On folic acid. Hg 8 on 1/29. 8.7 on 2/5. HG 8.5 on 2/11, stable. Appt with hematology and infusion clinic on 2/19.   8. CKD stage 3: Cr level 1.03 on 1/30. Cr 0.97 on 2/5. Stable.   9. UTI: completed keflex.   10. Allergic rhinitis: On flonase.   11. Constipation: On senna docusate two times a day.     Per therapy soft LCD 2/20 EZ stand assist of 1 for transfers, min/max for dressing, ambulates 10 feet in parallel bars with cga, slums 11.     Electronically signed by: Homa Smyth NP    Total 25 minutes of which 55% was spent in counseling and coordination of care of the above plan    Time spent in interview and examination of patient, review of available records, and discussion with nursing staff. Continue care plan, efforts at therapy, and monitor nutritional status.

## 2021-06-24 NOTE — PROGRESS NOTES
Please call and let [facility] know that the patient appears to be iron replete at this time.  No need for IV iron at this time.  Please asked him to continue with the oral iron tablets and multivitamin tablets daily.  Follow-up as planned in 6 weeks.    Vania Nur MD

## 2021-06-25 NOTE — PROGRESS NOTES
[facility] Patient Outreach:     Discharge Date::  3/20/19      Reason for TCU stay (discharge diagnosis)::  Fall with SDH and left hip fx s/p ORIF      Are you feeling better, the same or worse since your discharge?:  Patient is feeling better          As part of your discharge plan, did they discuss home care with you?: Yes        Have your seen them yet, or are they scheduled to visit?: Yes                Do you have any follow up visits scheduled with your PCP or Specialist?:  No          I'm glad to hear you're doing well and we want you to continue to do well. Your PCP would like to see you for a follow-up visit. Can we help set that up for your today?: Yes            (RN) Patient transferred to [facility]? **If immediate concers (e.g. patient is feeling worse and/or not taking new medictations), send in basket message to PCP with quick summary of concern.: Yes

## 2021-06-25 NOTE — PROGRESS NOTES
[facility]    DATE: 3/19/2019    NAME:  Lopez Mcdermott             :  1929  MRN: [id number]  CODE STATUS:  FULL CODE    VISIT TYPE: Discharge Summary     FACILITY:  [facility] [[id number]]       CHIEF COMPLAIN/REASON FOR VISIT:    Chief Complaint   Patient presents with     Discharge Summary               HISTORY OF PRESENT ILLNESS: Lopez Mcdermott is a 89 y.o. male who was admitted - for fall and found to have subdural hematoma on CT and Left comminuted intertrochanteric hip fracture. Neurosurgery was consulted and recommended conservative management. He was monitored in ICU and moved to general floor. His blood pressure was optimized <150. Neurology was consulted for possible seizure activity and recommended keppra. He underwent ORIF of Left hip fracture on . He was noted to have mod to severe AS and cardiology was consulted and recommended outpatient eval for TAVR procedure. He was also treated for UTI and completed 7 days of keflex. [facility]. He has PMH of moderate to severe AS, BPH, anemia, CKD stage 3, A fib, gupta esophagus. Prior to this he lived at home with his son and daughter in law.     TCU course:   Mr. Mcdermott made progress with therapy and was ambulating up to 75 feet with walker and cga. He was stand by assist for adls. He was assist of 1 for transfers, EZ stand at times. He scored 11 on slums and 4.2 on cpt. He completed therapy on 3/11 and has been private pay since then. During his stay his neuro status was stable and he had f/u with neurosurgery on 3/1 and keppra was stopped at that time. CT showed interval resolution of left frontoparietal subdural hematoma with interval resolution of small intraventricular hemorrhage. His hip incision has healed and he is no longer complaining of pain. He had f/u with ortho on 3/13 but no notes or orders were received from that appt even  after multiple requests. He has been weaned off all pain meds. He has been straight cathing 4 times a day without concern and does still void some on own. He was started on flomax. His anemia is being managed per hematology and had appt last on 2/19. He was previously on iron infusions but is now on iron supplement and will f/u next on 4/2. He was recommended to f/u with GI and has not had this appt yet. His hg was stable 8-9.6 during stay. He was treated for UTI with keflex. Therapy recommended 24 hour care due to cognitive impairment and mobility assist. Family has found placement with the [facility] where he will be provided with 24 hour care in a small group home for men. He will have HH PT, OT, HHA, RN. He will need to f/u with PCP in 5-7 days.         REVIEW OF SYSTEMS:  PROBLEMS AND REVIEW OF SYSTEMS:   Review of Systems  Today on ROS:   Currently, no fever, chills, or rigors. Decreased vision and hearing. Denies any chest pain, palpitations, lightheadedness, dizziness. Appetite is good. Denies any GERD symptoms. Denies any difficulty with swallowing, nausea, or vomiting.  Denies any abdominal pain, diarrhea or constipation. No active bleeding. No rash. Positive for left hip incisions healed, no visual changes, no headaches, no recent shortness of breath, urinary retention-straight cath 4 times daily, no pain recently      No Known Allergies  Current Outpatient Medications   Medication Sig     acetaminophen (TYLENOL) 500 MG tablet Take 2 tablets (1,000 mg total) by mouth 3 (three) times a day. (Patient taking differently: Take 1,000 mg by mouth every 6 (six) hours as needed.       )     doxazosin (CARDURA) 4 MG tablet Take 1 tablet (4 mg total) by mouth daily.     ferrous sulfate 325 (65 FE) MG tablet Take 1 tablet by mouth 2 (two) times a day.     fluticasone (FLONASE) 50 mcg/actuation nasal spray 1 spray into each nostril daily.     levETIRAcetam (KEPPRA) 500 MG tablet Take 1 tablet (500 mg total) by  mouth 2 (two) times a day.     metoprolol tartrate (LOPRESSOR) 25 MG tablet Take 1 tablet (25 mg total) by mouth daily.     multivitamin therapeutic tablet Take 1 tablet by mouth daily.     pantoprazole (PROTONIX) 40 MG tablet Take 1 tablet (40 mg total) by mouth daily.     senna-docusate (PERICOLACE) 8.6-50 mg tablet Take 1 tablet by mouth 2 (two) times a day.     tamsulosin (FLOMAX) 0.4 mg cap Take 0.4 mg by mouth.     Past Medical History:    No past medical history on file.        PHYSICAL EXAMINATION  Vitals:    03/18/19 1613   BP: 130/71   Pulse: 75   Resp: 18   Temp: (!) 96.4  F (35.8  C)   SpO2: 96%   Weight: 172 lb (78 kg)       Today on physical exam:     GENERAL: Awake, Alert, oriented x3, not in any form of acute distress, answers questions appropriately, follows simple commands, conversant  HEENT: Head is normocephalic with normal hair distribution. No evidence of trauma. Ears: No acute purulent discharge. Eyes: Conjunctivae pink with no scleral jaundice. Nose: Normal mucosa and septum. NECK: Supple with no cervical or supraclavicular lymphadenopathy. Trachea is midline. glasses  CHEST: No tenderness or deformity, no crepitus  LUNG: Clear to auscultation with good chest expansion. There are no crackles or wheezes, normal AP diameter.  BACK: No kyphosis of the thoracic spine. Symmetric, no curvature, ROM normal, no CVA tenderness, no spinal tenderness   CVS: irregularly irregular rhythm, systolic murmur, no rubs, gallops, or heaves,  2+ pulses symmetric in all extremities.  ABDOMEN: Rounded and soft, nontender to palpation, non distended, no masses, no organomegaly, good bowel sounds, no rebound or guarding, no peritoneal signs.   EXTREMITIES: no bLE pedal edema, Left hip two incisions healed,   SKIN: Warm and dry,   NEUROLOGICAL: The patient is oriented to person, place and time but is forgetful at times. No movement disorder, no arm drift, visual changes, no facial droop, unilateral weakness, no  numbness and tingling in left leg today            LABS:   No results found for this or any previous visit (from the past 168 hour(s)).  Results for orders placed or performed in visit on 02/05/19   Basic Metabolic Panel   Result Value Ref Range    Sodium 138 136 - 145 mmol/L    Potassium 3.8 3.5 - 5.0 mmol/L    Chloride 104 98 - 107 mmol/L    CO2 26 22 - 31 mmol/L    Anion Gap, Calculation 8 5 - 18 mmol/L    Glucose 87 70 - 125 mg/dL    Calcium 8.7 8.5 - 10.5 mg/dL    BUN 20 8 - 28 mg/dL    Creatinine 0.97 0.70 - 1.30 mg/dL    GFR MDRD Af Amer >60 >60 mL/min/1.73m2    GFR MDRD Non Af Amer >60 >60 mL/min/1.73m2         Lab Results   Component Value Date    WBC 9.1 02/19/2019    HGB 9.6 (L) 02/19/2019    HCT 31.9 (L) 02/19/2019    MCV 92 02/19/2019     02/19/2019       Lab Results   Component Value Date    [id number] 637 01/28/2019     No results found for: HGBA1C  Lab Results   Component Value Date    INR 1.18 (H) 01/26/2019    INR 1.27 (H) 07/23/2018    INR 1.21 (H) 05/16/2018     No results found for: [id number]  Lab Results   Component Value Date    TSH 3.19 01/28/2019           ASSESSMENT/PLAN:    1. Subdural hematoma, fall: resolved.   2. Left hip fracture, s/p ORIF: Incisions c/d/i, 1+ edema LLE. F/u with ortho on 3/13-no notes or orders available, requested records multiple times, unsure if needs follow up but appears doing well. tevin hose. Ice prn. PT, OT. No pain recently.   3. Moderate-severe AS: Was being evaluated for LAAO and TAVR procedure prior to hospital stay. Will need to f/u with cardiology once more stable.   4. BPH, straight cath: On doxazosin, flomax  straight cath four times a day as doing at home and f/u with urology after discharge. No recent concerns, staff assisting patient in doing.   5. A fibrillation: Rate controlled in 70s. On metoprolol. hold parameters.. F/u with cardiology.   6. Macdonald's esophagus, h/o GI bleed: On pantoprazole. No current issues.   7. Iron deficiency  anemia, acute blood loss anemia: Follows with hem/onc for weekly/biweekly hg levels, blood transfusions, iron infusions. On folic acid. Hg 8 on 1/29. 8.7 on 2/5. HG 8.5 on 2/11, stable. Appt with hematology and infusion clinic on 2/19-iron and transferrin, ferritin stable no need for iv infusion of iron. Hg improved to 9.6. F/u again on 4/2. Recommended gi follow up.   8. CKD stage 3: Cr level 1.03 on 1/30. Cr 0.97 on 2/5. Stable.   9.  Cognitive impairment: Only scored 11/30 on slums and 4.2 on cpt. Speech therapy was working on cognition and now completed. Therapy recommending 24 hour supervision for safety. A/o but forgetful.   10. Allergic rhinitis: On flonase.   11. Constipation: On senna docusate two times a day. No concerns.         Electronically signed by: Homa Smyth NP    Total floor/unit time 35 min, 25 min of which spent on counseling and coordination of care. Discussed med changes, lab results, need for primary care follow up and home health therapy. Discussed higher level of care, importance of med compliance, and need for cardiology follow up. Educated on anemia and need for gi follow up. Coordinated care with therapy, nursing,  for discharge planning and services for discharge. Coordinated care with nursing for medications and scripts for discharge.      Please evaluate Lopez Mcdermott for admission to Home Health.    Face to Face Attestation and Initial Plan of Care    The face-to-face encounter occurred on date: 3/19/19  Face to Face encounter was with: Homa Smyth    Please provide brief clinical summary of reason for visit and need for home care. Deconditioning after hospital stay for fall, subdural hematoma, left hip fracture    Please identify which of the following home health disciplines the patient will need AND describe the skilled services that you would like the home health agency to perform: SKILLED NURSING (RN): complex med management and Other straight cathing,  "urinary retention, PHYSICAL THERAPY: strength training and gait training, OCCUPATIONAL THERAPY: ADLs and home safety and HOME HEALTH AIDE    Homebound Status (describe the functional limitations that support this patient is confined to his/her home. Medicaid recipients are not required to be homebound.):assistive device needed:  2WW and Wheelchair    Name of physician who will be responsible for the ongoing home health plan of care (CMS requires the referring physician to provide the specific name of the community physician instead of a title, such as \"PCP\"): Inocente Colby MD    Requested Start of Care Date: Within 48 hours    Other information to assist the home health agency in developing the initial Plan of Care:    I certify that services are/were furnished while this patient was under the care of a physician and that a physician or an allowed non-physician practitioner (NPP), had a face-to-face encounter that meets the physician face-to-face encounter requirements. The encounter was in whole, or in part, related to the primary reason for home health. The patient is confined to his/her home and needs intermittent skilled nursing, physical therapy, speech-language pathology, or the continued need for occupational therapy. A plan of care has been established by a physician and is periodically reviewed by a physician.                       "

## 2021-06-27 NOTE — PROGRESS NOTES
Progress Notes by Pallavi Ward PA-C at 1/9/2019 10:30 AM     Author: Pallavi Ward PA-C Service: -- Author Type: Physician Assistant    Filed: 1/9/2019  2:03 PM Encounter Date: 1/9/2019 Status: Signed    : Pallavi Ward PA-C (Physician Assistant)           Click to link to [facility] HEART [facility] NOTE      Assessment/Recommendations   Problem List Items Addressed This Visit     Iron deficiency anemia due to chronic blood loss    Atrial fibrillation (H) - Primary    Severe aortic stenosis          1.  Atrial fibrillation: Asymptomatic and maintained on metoprolol.  He is not currently on oral anticoagulation since he has had GI bleed in the past, for blood transfusions and continues to have ongoing presumed blood loss from GI tract.      I have personally reviewed this patient's chart and have spoken with the patient about the treatment options, including JULIAN device.  He has a [id number] score of 4 for age greater than 75, hypertension and heart failure.  He has a HAS-BLED score of 2 for bleeding history and age greater than 65.   He is not a candidate for long-term anticoagulation due to 4 separate GI bleeds with hospitalization and transfusion as well as ongoing (and presumed) slow GI blood loss causing iron deficiency anemia which has required iron transfusions and blood transfusions.    We talked in detail regarding the screening for possible implant and what implant itself would entail.  He understands that he would need to be started on a short-term anticoagulation regimen prior to the implant of the device.  He is okay with this idea.  Approximately 45 days after implant, he will have a post procedure SONNY. If the post SONNY is negative for leaks and no thrombus is seen on the surface of the device, he would be instructed to stop the blood thinner.  At that time he would continue the aspirin 81 mg and add Plavix 75 mg by mouth daily for an  additional 4 months.  After being on DAPT for approximately 4 months, he will stop the Plavix and continue on just aspirin 81 mg daily indefinitely.  He understands that the risks of the procedure are <2% and include, but are not limited to device embolization, air embolism, myocardial perforation, device thrombosis, ASD, stroke, or death.  We discussed expected recovery and follow-up.       The patient is a good candidate for proceeding with left atrial appendage screening and implant.  His questions were answered to his satisfaction.  I think the only issue is the timing of device implant.  Normally we would take care of the TAVR first and then 6-8 weeks down the road would implant the watchman.  I will speak with Dr. Gracia and Dr. Beck to see if they want to stick with that protocol or if they would rather have Lopez get watchman first, in case he needs antiplatelet regimen for stents and then would need to be on triple therapy.  He has not had pre--TAVR coronary angiogram at this point, so some of that can be determined after that testing is complete.    I told Lopez that I do not think that we should start oral anticoagulation until approximately 2-3 days prior to the watchman implant.  I am recommending Eliquis only because he has had issues with both warfarin and Xarelto already.    2.  Aortic stenosis: Being worked up for TAVR (transcatheter aortic valve replacement).  He had his CTA today and still needs coronary angiogram.  Saw Dr. Maldonado yesterday and will see Dr. Luna for second surgical visit on January 24.  Will discuss with team regarding timing of both procedures.    3.  Iron deficiency anemia, presumed to be chronic, ongoing GI bleed: Patient seen in outpatient infusion clinic for labs every several weeks.  If hemoglobin is low, he will either get blood or iron transfusions.     History of Present Illness    Lopez Mcdermott is a 89 y.o. male who comes in today for discussion regarding left  atrial appendage occlusion device.  His son is with him at the appointment today.  Lopez lives with his son and daughter-in-law.    Lopez Mcdermott has a past history of atrial fibrillation, GI bleed, diverticulosis, peptic ulcer disease, benign prostatic hyperplasia, chronic kidney disease and newly diagnosed severe aortic stenosis.  He was sent to Dr. Gracia for consultation after his oncologist noticed a heart murmur.  Echo confirmed moderate to severe aortic stenosis with valve area 0.6 cm  and mean gradient 37 mmHg.  Dimensionless index is 0.2.  His left ventricular function is well preserved and he is proceeding with workup for TAVR (transcatheter aortic valve replacement) due to his advanced age and other comorbidities.    With his history of atrial fibrillation, he is at elevated risk for stroke.  He had been on warfarin and Xarelto in the past, but starting in April 2018 had approximately 4 hospitalizations for severe GI bleed requiring blood transfusions.  He was worked up fully by GI and no source of the bleeding was found, however he does have diverticulosis and peptic ulcer disease history.  Since that time he has been off OAC.     The patient tells me that he feels well overall, he does have some increased shortness of breath but no chest pain.  He does have some balance issues and occasional dizziness.  However, with that said, he does pretty well getting himself up and around the house and he does help with housework.  He routinely goes to the outpatient infusion center for labs and then gets iron or blood transfusions based on hemoglobin numbers.  This has been going on for the last 2 months and last hemoglobin was 9.4 (12/31/18)    Lopez Mcdermott denies chest discomfort, palpitations, paroxysmal nocturnal dyspnea, orthopnea, pre-syncope, or syncope.  Lopez Mcdermott also denies any weight loss, changes in appetite, nausea or vomiting.     Medical, surgical, family, social history, and medications  "were reviewed and updated as necessary.       Physical Examination Review of Systems   Vitals:    01/09/19 1028   BP: 132/80   Pulse: 60   Resp: 18     Body mass index is 26.15 kg/m .  Wt Readings from Last 3 Encounters:   01/09/19 172 lb (78 kg)   01/08/19 170 lb (77.1 kg)   12/26/18 168 lb (76.2 kg)       General Appearance:   Alert, cooperative and in no acute distress   ENT/Mouth: membranes moist, no oral lesions or bleeding gums.      EYES:  no scleral icterus, normal conjunctivae   Neck: Thyroid not visualized   Chest/Lungs:   lungs are clear to auscultation, no rales or wheezing   Cardiovascular:   Regular . Normal first and second heart sounds with 4/6 systolic murmur heard best at the right upper sternal border.  No rubs or gallops; the carotid, radial and posterior tibial pulses are intact, no edema bilaterally    Abdomen:  Soft and nontender. Bowel sounds are present in all quadrants   Extremities: no cyanosis or clubbing   Skin: no xanthelasma, warm.    Neurologic: normal gait, normal  bilateral, no tremors   Psychiatric: Normal mood and affect    General: WNL  Eyes: WNL  Ears/Nose/Throat: Hearing Loss  Lungs: Cough, Wheezing  Heart: WNL  Stomach: WNL  Bladder: Frequent Urination at Night  Muscle/Joints: WNL  Skin: WNL  Nervous System: WNL  Mental Health: WNL     Blood: WNL     Medical History  Surgical History Family History Social History   No past medical history on file. Past Surgical History:   Procedure Laterality Date   ? CERVICAL FUSION  1971, 1977    x2   ? COLONOSCOPY  05/31/2017   ? COLONOSCOPY  04/18/2018    Multiple diverticula. \"Evidence of recent bleeding from diverticular opening.\"    ? COLONOSCOPY  05/02/2018    Black material was found in the entire colon.     ? COLONOSCOPY W/ POLYPECTOMY  11/30/2004   ? ESOPHAGOGASTRODUODENOSCOPY  04/13/1998    Healed antral ulcers.    ? ESOPHAGOGASTRODUODENOSCOPY  02/13/1998    Hiatal hernia with reflux esophagitis and distal esophageal erosions, " "multiple antral ulcers.   ? 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   ? ESOPHAGOGASTRODUODENOSCOPY  2017   ? ESOPHAGOGASTRODUODENOSCOPY  10/24/2017   ? ESOPHAGOGASTRODUODENOSCOPY  2018   ? ESOPHAGOGASTRODUODENOSCOPY  2018   ? INGUINAL HERNIA REPAIR Bilateral 2001    Right indirect and direct inguinal hernia, left direct inguinal hernia.    ? TRANSURETHRAL RESECTION OF PROSTATE  2006    Cystoscopy, KTP laser TURP.   ? TRANSURETHRAL RESECTION OF PROSTATE  10/21/2009    CYSTOSCOPY, TUR OF PROSTATE WITH KTP LASER - PROCEDURE: CYSTOSCOPY AND TRANSURETHRAL RESECTION OF PROSTATE WITH KTP LASER    Family History   Problem Relation Age of Onset   ? Heart disease Father    ? Arthritis Son    ? Asthma Son    ? Asthma Son     Social History     Socioeconomic History   ? Marital status:      Spouse name: Not on file   ? Number of children: 2   ? Years of education: Not on file   ? Highest education level: Not on file   Social Needs   ? Financial resource strain: Not on file   ? Food insecurity - worry: Not on file   ? Food insecurity - inability: Not on file   ? Transportation needs - medical: Not on file   ? Transportation needs - non-medical: Not on file   Occupational History   ? Occupation: brick layer     Employer: RETIRED   Tobacco Use   ? Smoking status: Former Smoker     Packs/day: 2.00     Types: Cigarettes     Last attempt to quit: 1987     Years since quittin.6   ? Smokeless tobacco: Never Used   Substance and Sexual Activity   ? Alcohol use: No     Comment: \"not much in the last few years\"   ? Drug use: No   ? Sexual activity: Not Currently   Other Topics Concern   ? Not on file   Social History Narrative    Diet-        Exercise-        Lives with Son        Wife  from Hepatitis B          Medications  Allergies "   Current Outpatient Medications   Medication Sig Dispense Refill   ? acetaminophen (TYLENOL) 500 MG tablet Take 500 mg by mouth daily.      ? doxazosin (CARDURA) 4 MG tablet Take 1 tablet (4 mg total) by mouth daily. 90 tablet 3   ? fluticasone (FLONASE) 50 mcg/actuation nasal spray 1 spray into each nostril daily. 16 g 5   ? folic acid (FOLVITE) 1 MG tablet Take 1 tablet (1 mg total) by mouth daily. 90 tablet 3   ? metoprolol tartrate (LOPRESSOR) 25 MG tablet Take 1 tablet (25 mg total) by mouth daily. 90 tablet 3   ? multivitamin therapeutic tablet Take 1 tablet by mouth daily.     ? pantoprazole (PROTONIX) 40 MG tablet Take 1 tablet (40 mg total) by mouth daily. 90 tablet 3     No current facility-administered medications for this visit.       No Known Allergies      Lab Results    Chemistry/lipid CBC Cardiac Enzymes/BNP/TSH/INR   Lab Results   Component Value Date    CREATININE 1.17 11/13/2018    BUN 25 11/13/2018    K 4.0 11/13/2018     11/13/2018     (H) 11/13/2018    CO2 24 11/13/2018    Lab Results   Component Value Date    WBC 5.5 12/31/2018    HGB 9.4 (L) 12/31/2018    HCT 31.6 (L) 12/31/2018    MCV 92 12/31/2018     12/31/2018    Lab Results   Component Value Date    TSH 3.01 07/23/2018    INR 1.27 (H) 07/23/2018          40 minutes were spent with the patient with greater than 50% spent on education and counseling.    This note has been dictated using voice recognition software. Any grammatical or context distortions are unintentional and inherent to the software.    Pallavi Ward PA-C, MPAS  Structural Heart Program  [facility]

## 2021-07-03 NOTE — ADDENDUM NOTE
Addendum Note by Homa Smyth NP at 2/21/2019 11:59 PM     Author: Homa Smyth NP Service: -- Author Type: Nurse Practitioner    Filed: 2/21/2019 12:48 PM Encounter Date: 2/21/2019 Status: Signed    : Homa Smyth NP (Nurse Practitioner)    Addended by: HOMA SMYTH on: 2/21/2019 12:48 PM        Modules accepted: Orders

## 2021-07-03 NOTE — ADDENDUM NOTE
Addendum Note by Brooke Cuevas CMA at 3/25/2019 10:35 AM     Author: Brooke Cuevas CMA Service: -- Author Type: Certified Medical Assistant    Filed: 3/25/2019 10:35 AM Encounter Date: 3/22/2019 Status: Signed    : Brooke Cuevas CMA (Certified Medical Assistant)    Addended by: BROOKE CUEVAS on: 3/25/2019 10:35 AM        Modules accepted: Orders

## 2021-07-03 NOTE — ADDENDUM NOTE
Addendum Note by Elsi Malik CNP at 5/21/2019  1:00 PM     Author: Elsi Malik CNP Service: -- Author Type: Nurse Practitioner    Filed: 5/24/2019  8:59 AM Encounter Date: 5/21/2019 Status: Signed    : Elsi Malik CNP (Nurse Practitioner)    Addended by: ELSI MALIK on: 5/24/2019 08:59 AM        Modules accepted: Orders

## 2021-07-03 NOTE — ADDENDUM NOTE
Addendum Note by Inocente Thurston MD at 3/25/2019 11:59 AM     Author: Inocente Thurston MD Service: -- Author Type: Physician    Filed: 3/25/2019 11:59 AM Encounter Date: 3/22/2019 Status: Signed    : Inocente Thurston MD (Physician)    Addended by: INOCENTE THURSTON on: 3/25/2019 11:59 AM        Modules accepted: Orders

## 2021-07-14 PROBLEM — K57.91 GASTROINTESTINAL HEMORRHAGE ASSOCIATED WITH INTESTINAL DIVERTICULOSIS: Status: RESOLVED | Noted: 2018-05-14 | Resolved: 2018-09-19

## 2021-08-03 PROBLEM — S72.142A INTERTROCHANTERIC FRACTURE OF LEFT HIP (H): Status: RESOLVED | Noted: 2019-01-26 | Resolved: 2019-03-08

## 2025-05-05 ENCOUNTER — TRANSFERRED RECORDS (OUTPATIENT)
Dept: HEALTH INFORMATION MANAGEMENT | Facility: CLINIC | Age: OVER 89
End: 2025-05-05

## 2025-05-19 ENCOUNTER — TRANSFERRED RECORDS (OUTPATIENT)
Dept: HEALTH INFORMATION MANAGEMENT | Facility: CLINIC | Age: OVER 89
End: 2025-05-19

## 2025-06-17 NOTE — DISCHARGE SUMMARY
[facility]  Hospitalist Discharge Summary       Date of Admission:  11/25/2019  Date of Discharge:  11/30/2019 12:13 PM  Discharging Provider: Asad Shields MD      Discharge Diagnoses   Acute diastolic and possible mild systolic congestive heart failure  Elevated troponin  Severe aortic stenosis  Right pleural effusion and small volume ascites likely secondary to CHF.  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  The following labs/tests are recommended: BMP 1   week, result to PCP.    Follow up with Cardiology in 1-2 week.             Unresulted Labs Ordered in the Past 30 Days of this Admission     No orders found from 10/26/2019 to 11/26/2019.        Discharge Disposition   Discharged to home  Condition at discharge: Stable    Hospital Course   Lopez Mcdermott is a 89 year old male with history including moderate severe aortic stenosis, Macdonald's esophagus, subdural hematoma in 1/2019, BPH who requires intermittent straight cath, CKD stage II, chronic A. fib not on anticoagulant and or aspirin, HTN, iron deficiency anemia, AAA, and GI bleed who presents with abdominal distention and weight gain. The patient fell a few days ago and was evaluated in the ED with a CT head and C-spine that were negative. He notes increasing abdominal distention over the past 2 to 3 days and moderate discomfort with this although no specific pain per se.  He is also noted that he has had significantly increased lower extremity swelling right greater than left over this period of time and weight gain.  Patient was brought to the emergency room and admitted to the hospital for further work-up and evaluation treatment.     1.   Left message for patient to call office back.   Abdominal pain, small-volume ascites likely secondary to CHF. Ultrasound showed small volume ascites, abdominal girth is decreased with diuretics, no abdominal tenderness, no fever, no leukocytosis, given the size no need to do paracentesis at this point. CT abdomen pelvis with contrast at outside facility report in the ED note states extensive ascites but ultrasound stated small size. CT also reported infrarenal abdominal aortic aneurysm measuring 51 x 50 mm at maximum dimension he has known history, will follow-up with his providers as an outpatient. His albumin is 4, INR is 1.23, doubt cirrhosis, but needs outpatient follow-up. continue IV diuretics, no need for paracentesis at this point, patient is not symptomatic and there is no sign of infection.  -Jimenez catheter was placed initially in the ED for strict I/o along with urinary retention. It was removed and now able to urinate.     2.  Acute diastolic and possible mild systolic CHF, elevated troponin, severe aortic stenosis, right pleural effusion, HTN, HLD: Patient has significant lower extremity edema, right leg more than left .  Right lower extremity ultrasound ruled out DVT.He has moderate right pleural effusion on chest x-ray, which is not new.  -BNP is elevated at 16,000.  Troponin is elevated at 0.055. No chest pain, EKG showed A. fib without any ischemic changes, likely demand ischemia. He does have known moderate to severe aortic stenosis, with systolic murmur consistent with aortic stenosis.  He was gently diuresed in the hospital with IV Lasix 20 mg twice a day, later on changed to  lasix 40 mg po BID, and discharged on the same dose.  He will have his electrolytes and renal function checked as an outpatient.  Echo showed EF of 45 to 50%, severe aortic stenosis, global hypokinesia of the left ventricle, dilated right ventricle with reduced right ventricular function.  -Cardiology consulted, input appreciated.  Cardiology recommended patient  follow-up with his primary cardiologist as an outpatient. His symptoms could be due to worsening signs of aortic stenosis.  Started on Metoprolol succinate 25 mg daily, continue Aspirin 81 mg.  There was no need for thoracentesis or paracentesis.  Patient was not in respiratory distress nor has dependent edema's. Chest x-ray showed right pleural effusion with associated atelectasis which seems slightly increased from prior x-ray.  Patient is asymptomatic     3. Chronic Afib: Not on anticoagulation secondary to previous GI bleed and intracranial bleeding.  [facility] hospital was closely monitored on telemetry.  Patient was taken off his anticoagulation in the past due to significant GI bleeding.  Noted discharge for score is higher and he is at increased risk of thromboembolic phenomenon as well.     4.  Fall: Patient fell down few days ago and had extensive work-up for this at the time, including imaging with CT head, C-spine, chest x-ray without any obvious injury. Suspect related to his significant weight gain and edema.  -PT consult  -Fall precautions      5. YOLANDA on CKD stage II:  admission creatinine of 1.48 with baseline 1.2.  Suspect related to his acute CHF and will improve with diuretics.  -bmp tomorrow     6.  BPH: Continue PTA doxazosin 4 mg at bedtime.  Patient does intermittently straight cath at home.  -Jimenez was placed on admission, discontinued after 24 hours.  -Continue doxazosin and add Flomax daily.     7.  AAA: Known AAA that has been followed regularly.  Again seen on his recent CT abdomen pelvis with IV contrast is a 5 x 5 cm AAA, slightly increased from previous 4 x 4 centimeter.  He will follow-up with his vascular surgeon as an outpatient.     Consultations This Hospital Stay   PHYSICAL THERAPY ADULT IP CONSULT  CARE COORDINATOR IP CONSULT  CARDIOLOGY IP CONSULT  PHYSICAL THERAPY ADULT IP CONSULT  OCCUPATIONAL THERAPY ADULT IP CONSULT    Code Status   DNR    Time Spent on this Encounter   I,  Asad Shields MD, personally saw the patient today and spent greater than 30 minutes discharging this patient.       Asad Shields MD  [facility]  ______________________________________________________________________    Physical Exam   Vital Signs: Temp: 97.5  F (36.4  C) Temp src: Oral BP: 121/68   Heart Rate: 61 Resp: 16 SpO2: 95 % O2 Device: None (Room air)    Weight: 205 lbs 9.6 oz  General Appearance:  Alert and oriented, not in distress  CHEST: Decreased breath sound posterior lower half more on the right lung field, scattered crackles,no wheezing.  CVS: S1 and S2 well heard, systolic murmur grade 3/6 right sternal border radiating to the neck, irregularly irregular  GI: Soft, nontender, slightly distended, positive bowel sounds organomegaly.  Skin: No rash, exanthems or petechia, no jaundice.  EXT: Right lower extremity swelling significantly improved, only trace edema, left lower extremity swelling resolved.       Primary Care Physician   Michelle Gary    Discharge Orders      Home care nursing referral      Home Care PT Referral for Hospital Discharge      Home Care OT Referral for Hospital Discharge      Reason for your hospital stay    CHF     Follow-up and recommended labs and tests     Follow up with primary care provider, Michelle Gary, within 7 days for hospital follow- up.  The following labs/tests are recommended: BMP 1 week, result to PCP.    Follow up with Cardiology in 1-2 week.     Activity    Your activity upon discharge: activity as tolerated     Monitor and record    blood pressure daily     MD face to face encounter    Documentation of Face to Face and Certification for Home Health Services    I certify that patient: Lopez Mcdermott is under my care and that I, or a nurse practitioner or physician's assistant working with me, had a face-to-face encounter that meets the physician face-to-face encounter requirements with this patient on:  11/30/2019.    This encounter with the patient was in whole, or in part, for the following medical condition, which is the primary reason for home health care: CHF, recurrent fall and checking hydration, fluid status and nutrtion.    I certify that, based on my findings, the following services are medically necessary home health services: Nursing, Occupational Therapy and Physical Therapy.    My clinical findings support the need for the above services because: Nurse is needed: To provide assessment and oversight required in the home to assure adherence to the medical plan due to: CHF and fluid overload and edema.., Occupational Therapy Services are needed to assess and treat cognitive ability and address ADL safety due to impairment and ADL. and Physical Therapy Services are needed to assess and treat the following functional impairments: weakness, deconditioning and fall..  Requires assistance of another person or specialized equipment to access medical services because patient: Is unable to operate assistive equipment on their own. and Requires supervision of another for safe transfer..  Further, I certify that my clinical findings support that this patient is homebound (i.e. absences from home require considerable and taxing effort and are for medical reasons or [employer] services or infrequently or of short duration when for other reasons) because: .    Based on the above findings. I certify that this patient is confined to the home and needs intermittent skilled nursing care, physical therapy and/or speech therapy.  The patient is under my care, and I have initiated the establishment of the plan of care.  This patient will be followed by a physician who will periodically review the plan of care.  Physician/Provider to provide follow up care: Michelle Gary    Attending hospital physician (the Medicare certified [facility] provider): Asad Shields MD  Physician Signature: See electronic signature  associated with these discharge orders.  Date: 11/30/2019     DNR (Do Not Resuscitate)     Diet    Follow this diet upon discharge: Orders Placed This Encounter      2 Gram Sodium Diet       Significant Results and Procedures   Most Recent 3 CBC's:  Recent Labs   Lab Test 11/26/19  0611 11/25/19  1726   WBC 6.7 7.7   HGB 11.4* 11.7*   * 103*   * 147*     Most Recent 3 BMP's:  Recent Labs   Lab Test 11/30/19  0645 11/29/19  0553 11/28/19  0648    138 139   POTASSIUM 3.7 3.7 3.6   CHLORIDE 103 102 104   CO2 29 30 29   BUN 29 27 28   CR 1.36* 1.38* 1.44*   ANIONGAP 5 6 6   KENY 8.5 8.6 8.5   GLC 86 86 85     Most Recent 3 INR's:  Recent Labs   Lab Test 11/25/19  1726   INR 1.23*       Discharge Medications   Discharge Medication List as of 11/30/2019 11:47 AM      START taking these medications    Details   aspirin (ASA) 81 MG EC tablet Take 1 tablet (81 mg) by mouth daily, Disp-30 tablet, R-3, E-Prescribe      furosemide (LASIX) 40 MG tablet Take 1 tablet (40 mg) by mouth 2 times daily, Disp-60 tablet, R-1, E-Prescribe         CONTINUE these medications which have NOT CHANGED    Details   acetaminophen (TYLENOL) 500 MG tablet Take 1,000 mg by mouth every 6 hours as needed for mild pain, Historical      doxazosin (CARDURA) 4 MG tablet Take 4 mg by mouth At Bedtime, Historical      folic acid (FOLVITE) 1 MG tablet Take 1 mg by mouth daily, Historical      metoprolol succinate ER (TOPROL-XL) 25 MG 24 hr tablet Take 25 mg by mouth daily, Historical      pantoprazole (PROTONIX) 40 MG EC tablet Take 40 mg by mouth daily, Historical      sodium chloride (NEBUSAL) 3 % neb solution Take 3 mLs by nebulization 2 times daily, Historical           Allergies   No Known Allergies